# Patient Record
Sex: FEMALE | Race: BLACK OR AFRICAN AMERICAN | NOT HISPANIC OR LATINO | Employment: FULL TIME | URBAN - METROPOLITAN AREA
[De-identification: names, ages, dates, MRNs, and addresses within clinical notes are randomized per-mention and may not be internally consistent; named-entity substitution may affect disease eponyms.]

---

## 2017-01-06 RX ORDER — PEN NEEDLE, DIABETIC 30 GX3/16"
NEEDLE, DISPOSABLE MISCELLANEOUS
Qty: 150 EACH | Refills: 6 | Status: CANCELLED | OUTPATIENT
Start: 2017-01-06

## 2017-01-06 RX ORDER — INSULIN ASPART 100 [IU]/ML
5 INJECTION, SOLUTION INTRAVENOUS; SUBCUTANEOUS
Qty: 1 BOX | Refills: 6 | Status: CANCELLED | OUTPATIENT
Start: 2017-01-06 | End: 2018-01-06

## 2017-01-06 NOTE — TELEPHONE ENCOUNTER
Called in refill for medications listed below novolog dispense 1 box no refills, levemir dispense 3 ml no refills, pen needles dispense 150 refills 0 called to [facility]

## 2017-01-06 NOTE — TELEPHONE ENCOUNTER
"----- Message from Shona Panda sent at 1/6/2017  9:24 AM CST -----  Contact: Patient  Patient need a Rx refill for:    insulin aspart (NOVOLOG) 100 unit/mL InPn pen  insulin detemir (LEVEMIR FLEXPEN) 100 unit/mL (3 mL) SubQ InPn pen  pen needle, diabetic 31 gauge x 5/16" Ndle    Please send Rx to [facility] [phone] (Phone)  [phone] (Fax)    Please call patient at [phone].  "

## 2017-01-09 ENCOUNTER — HOSPITAL ENCOUNTER (INPATIENT)
Facility: HOSPITAL | Age: 37
LOS: 2 days | Discharge: HOME | DRG: 638 | End: 2017-01-11
Attending: EMERGENCY MEDICINE | Admitting: HOSPITALIST
Payer: COMMERCIAL

## 2017-01-09 DIAGNOSIS — E10.10 DIABETIC KETOACIDOSIS WITHOUT COMA ASSOCIATED WITH TYPE 1 DIABETES MELLITUS: Primary | ICD-10-CM

## 2017-01-09 PROBLEM — N17.9 AKI (ACUTE KIDNEY INJURY): Status: ACTIVE | Noted: 2017-01-09

## 2017-01-09 PROBLEM — E87.1 HYPONATREMIA: Status: ACTIVE | Noted: 2017-01-09

## 2017-01-09 LAB
ALBUMIN SERPL BCP-MCNC: 4.6 G/DL
ALLENS TEST: ABNORMAL
ALP SERPL-CCNC: 71 U/L
ALT SERPL W/O P-5'-P-CCNC: 13 U/L
ANION GAP SERPL CALC-SCNC: 12 MMOL/L
ANION GAP SERPL CALC-SCNC: 14 MMOL/L
ANION GAP SERPL CALC-SCNC: 17 MMOL/L
ANION GAP SERPL CALC-SCNC: 23 MMOL/L
AST SERPL-CCNC: 25 U/L
B-HCG UR QL: NEGATIVE
B-OH-BUTYR BLD STRIP-SCNC: 5.1 MMOL/L
BACTERIA #/AREA URNS AUTO: NORMAL /HPF
BASOPHILS # BLD AUTO: 0.01 K/UL
BASOPHILS NFR BLD: 0.1 %
BILIRUB SERPL-MCNC: 0.4 MG/DL
BILIRUB UR QL STRIP: NEGATIVE
BUN SERPL-MCNC: 11 MG/DL
BUN SERPL-MCNC: 5 MG/DL
BUN SERPL-MCNC: 7 MG/DL
BUN SERPL-MCNC: 9 MG/DL
CALCIUM SERPL-MCNC: 7.8 MG/DL
CALCIUM SERPL-MCNC: 8 MG/DL
CALCIUM SERPL-MCNC: 8.1 MG/DL
CALCIUM SERPL-MCNC: 9.4 MG/DL
CHLORIDE SERPL-SCNC: 108 MMOL/L
CHLORIDE SERPL-SCNC: 108 MMOL/L
CHLORIDE SERPL-SCNC: 109 MMOL/L
CHLORIDE SERPL-SCNC: 97 MMOL/L
CLARITY UR REFRACT.AUTO: CLEAR
CO2 SERPL-SCNC: 6 MMOL/L
CO2 SERPL-SCNC: 6 MMOL/L
CO2 SERPL-SCNC: 7 MMOL/L
CO2 SERPL-SCNC: 9 MMOL/L
COLOR UR AUTO: ABNORMAL
CREAT SERPL-MCNC: 1.1 MG/DL
CREAT SERPL-MCNC: 1.1 MG/DL
CREAT SERPL-MCNC: 1.2 MG/DL
CREAT SERPL-MCNC: 1.6 MG/DL
CTP QC/QA: YES
DIFFERENTIAL METHOD: ABNORMAL
EOSINOPHIL # BLD AUTO: 0 K/UL
EOSINOPHIL NFR BLD: 0 %
ERYTHROCYTE [DISTWIDTH] IN BLOOD BY AUTOMATED COUNT: 11.8 %
EST. GFR  (AFRICAN AMERICAN): 48.1 ML/MIN/1.73 M^2
EST. GFR  (AFRICAN AMERICAN): >60 ML/MIN/1.73 M^2
EST. GFR  (NON AFRICAN AMERICAN): 41.7 ML/MIN/1.73 M^2
EST. GFR  (NON AFRICAN AMERICAN): 59.1 ML/MIN/1.73 M^2
EST. GFR  (NON AFRICAN AMERICAN): >60 ML/MIN/1.73 M^2
EST. GFR  (NON AFRICAN AMERICAN): >60 ML/MIN/1.73 M^2
ESTIMATED AVG GLUCOSE: 278 MG/DL
GLUCOSE SERPL-MCNC: 216 MG/DL
GLUCOSE SERPL-MCNC: 250 MG/DL
GLUCOSE SERPL-MCNC: 283 MG/DL
GLUCOSE SERPL-MCNC: 529 MG/DL
GLUCOSE UR QL STRIP: ABNORMAL
HBA1C MFR BLD HPLC: 11.3 %
HCO3 UR-SCNC: 7.3 MMOL/L (ref 24–28)
HCT VFR BLD AUTO: 44.5 %
HGB BLD-MCNC: 15.4 G/DL
HGB UR QL STRIP: ABNORMAL
HYALINE CASTS UR QL AUTO: 0 /LPF
KETONES UR QL STRIP: ABNORMAL
LACTATE SERPL-SCNC: 1.4 MMOL/L
LEUKOCYTE ESTERASE UR QL STRIP: NEGATIVE
LIPASE SERPL-CCNC: 25 U/L
LYMPHOCYTES # BLD AUTO: 2.2 K/UL
LYMPHOCYTES NFR BLD: 25.4 %
MAGNESIUM SERPL-MCNC: 2.8 MG/DL
MCH RBC QN AUTO: 32.2 PG
MCHC RBC AUTO-ENTMCNC: 34.6 %
MCV RBC AUTO: 93 FL
MICROSCOPIC COMMENT: NORMAL
MONOCYTES # BLD AUTO: 0.4 K/UL
MONOCYTES NFR BLD: 4.4 %
NEUTROPHILS # BLD AUTO: 6 K/UL
NEUTROPHILS NFR BLD: 70 %
NITRITE UR QL STRIP: NEGATIVE
PCO2 BLDA: 22 MMHG (ref 35–45)
PH SMN: 7.13 [PH] (ref 7.35–7.45)
PH UR STRIP: 6 [PH] (ref 5–8)
PHOSPHATE SERPL-MCNC: 1.8 MG/DL
PHOSPHATE SERPL-MCNC: 1.9 MG/DL
PHOSPHATE SERPL-MCNC: 3.2 MG/DL
PLATELET # BLD AUTO: 236 K/UL
PMV BLD AUTO: 10.4 FL
PO2 BLDA: 41 MMHG (ref 40–60)
POC BE: -22 MMOL/L
POC SATURATED O2: 62 % (ref 95–100)
POC TCO2: 8 MMOL/L (ref 24–29)
POCT GLUCOSE: 183 MG/DL (ref 70–110)
POCT GLUCOSE: 185 MG/DL (ref 70–110)
POCT GLUCOSE: 199 MG/DL (ref 70–110)
POCT GLUCOSE: 201 MG/DL (ref 70–110)
POCT GLUCOSE: 213 MG/DL (ref 70–110)
POCT GLUCOSE: 220 MG/DL (ref 70–110)
POCT GLUCOSE: 228 MG/DL (ref 70–110)
POCT GLUCOSE: 237 MG/DL (ref 70–110)
POCT GLUCOSE: 243 MG/DL (ref 70–110)
POCT GLUCOSE: 255 MG/DL (ref 70–110)
POCT GLUCOSE: 270 MG/DL (ref 70–110)
POCT GLUCOSE: 288 MG/DL (ref 70–110)
POCT GLUCOSE: 296 MG/DL (ref 70–110)
POTASSIUM SERPL-SCNC: 3.9 MMOL/L
POTASSIUM SERPL-SCNC: 4.5 MMOL/L
POTASSIUM SERPL-SCNC: 4.6 MMOL/L
POTASSIUM SERPL-SCNC: 5 MMOL/L
PROT SERPL-MCNC: 10.1 G/DL
PROT UR QL STRIP: ABNORMAL
RBC # BLD AUTO: 4.79 M/UL
RBC #/AREA URNS AUTO: 4 /HPF (ref 0–4)
SAMPLE: ABNORMAL
SITE: ABNORMAL
SODIUM SERPL-SCNC: 126 MMOL/L
SODIUM SERPL-SCNC: 129 MMOL/L
SODIUM SERPL-SCNC: 130 MMOL/L
SODIUM SERPL-SCNC: 131 MMOL/L
SP GR UR STRIP: 1.02 (ref 1–1.03)
SQUAMOUS #/AREA URNS AUTO: 1 /HPF
URN SPEC COLLECT METH UR: ABNORMAL
UROBILINOGEN UR STRIP-ACNC: NEGATIVE EU/DL
WBC # BLD AUTO: 8.59 K/UL
WBC #/AREA URNS AUTO: 1 /HPF (ref 0–5)
YEAST UR QL AUTO: NORMAL

## 2017-01-09 PROCEDURE — 20600001 HC STEP DOWN PRIVATE ROOM

## 2017-01-09 PROCEDURE — 83690 ASSAY OF LIPASE: CPT

## 2017-01-09 PROCEDURE — 96375 TX/PRO/DX INJ NEW DRUG ADDON: CPT

## 2017-01-09 PROCEDURE — 81025 URINE PREGNANCY TEST: CPT | Performed by: EMERGENCY MEDICINE

## 2017-01-09 PROCEDURE — 82962 GLUCOSE BLOOD TEST: CPT

## 2017-01-09 PROCEDURE — 25000003 PHARM REV CODE 250: Performed by: HOSPITALIST

## 2017-01-09 PROCEDURE — 36591 DRAW BLOOD OFF VENOUS DEVICE: CPT

## 2017-01-09 PROCEDURE — 85025 COMPLETE CBC W/AUTO DIFF WBC: CPT

## 2017-01-09 PROCEDURE — 96372 THER/PROPH/DIAG INJ SC/IM: CPT

## 2017-01-09 PROCEDURE — 81001 URINALYSIS AUTO W/SCOPE: CPT

## 2017-01-09 PROCEDURE — 80053 COMPREHEN METABOLIC PANEL: CPT

## 2017-01-09 PROCEDURE — 63600175 PHARM REV CODE 636 W HCPCS: Performed by: EMERGENCY MEDICINE

## 2017-01-09 PROCEDURE — 83036 HEMOGLOBIN GLYCOSYLATED A1C: CPT

## 2017-01-09 PROCEDURE — 83605 ASSAY OF LACTIC ACID: CPT

## 2017-01-09 PROCEDURE — 82803 BLOOD GASES ANY COMBINATION: CPT

## 2017-01-09 PROCEDURE — 99285 EMERGENCY DEPT VISIT HI MDM: CPT | Mod: 25

## 2017-01-09 PROCEDURE — 82010 KETONE BODYS QUAN: CPT

## 2017-01-09 PROCEDURE — 96366 THER/PROPH/DIAG IV INF ADDON: CPT

## 2017-01-09 PROCEDURE — 63600175 PHARM REV CODE 636 W HCPCS: Performed by: HOSPITALIST

## 2017-01-09 PROCEDURE — 36415 COLL VENOUS BLD VENIPUNCTURE: CPT

## 2017-01-09 PROCEDURE — 80048 BASIC METABOLIC PNL TOTAL CA: CPT

## 2017-01-09 PROCEDURE — 99223 1ST HOSP IP/OBS HIGH 75: CPT | Mod: ,,, | Performed by: HOSPITALIST

## 2017-01-09 PROCEDURE — 25000003 PHARM REV CODE 250: Performed by: EMERGENCY MEDICINE

## 2017-01-09 PROCEDURE — 93005 ELECTROCARDIOGRAM TRACING: CPT

## 2017-01-09 PROCEDURE — 84100 ASSAY OF PHOSPHORUS: CPT

## 2017-01-09 PROCEDURE — 80048 BASIC METABOLIC PNL TOTAL CA: CPT | Mod: 91

## 2017-01-09 PROCEDURE — 99291 CRITICAL CARE FIRST HOUR: CPT | Mod: ,,, | Performed by: EMERGENCY MEDICINE

## 2017-01-09 PROCEDURE — 93010 ELECTROCARDIOGRAM REPORT: CPT | Mod: ,,, | Performed by: INTERNAL MEDICINE

## 2017-01-09 PROCEDURE — 83735 ASSAY OF MAGNESIUM: CPT

## 2017-01-09 PROCEDURE — 96365 THER/PROPH/DIAG IV INF INIT: CPT

## 2017-01-09 RX ORDER — SODIUM CHLORIDE 450 MG/100ML
1000 INJECTION, SOLUTION INTRAVENOUS CONTINUOUS
Status: DISCONTINUED | OUTPATIENT
Start: 2017-01-09 | End: 2017-01-09

## 2017-01-09 RX ORDER — ACETAMINOPHEN 325 MG/1
500 TABLET ORAL EVERY 6 HOURS PRN
COMMUNITY
End: 2019-10-08

## 2017-01-09 RX ORDER — SODIUM CHLORIDE 9 MG/ML
INJECTION, SOLUTION INTRAVENOUS CONTINUOUS
Status: DISCONTINUED | OUTPATIENT
Start: 2017-01-09 | End: 2017-01-09

## 2017-01-09 RX ORDER — ONDANSETRON 2 MG/ML
4 INJECTION INTRAMUSCULAR; INTRAVENOUS
Status: COMPLETED | OUTPATIENT
Start: 2017-01-09 | End: 2017-01-09

## 2017-01-09 RX ORDER — DEXTROSE MONOHYDRATE AND SODIUM CHLORIDE 5; .45 G/100ML; G/100ML
INJECTION, SOLUTION INTRAVENOUS CONTINUOUS
Status: DISCONTINUED | OUTPATIENT
Start: 2017-01-09 | End: 2017-01-10

## 2017-01-09 RX ORDER — ACETAMINOPHEN 325 MG/1
650 TABLET ORAL EVERY 6 HOURS PRN
Status: DISCONTINUED | OUTPATIENT
Start: 2017-01-09 | End: 2017-01-11 | Stop reason: HOSPADM

## 2017-01-09 RX ORDER — ENOXAPARIN SODIUM 100 MG/ML
40 INJECTION SUBCUTANEOUS EVERY 24 HOURS
Status: DISCONTINUED | OUTPATIENT
Start: 2017-01-09 | End: 2017-01-11 | Stop reason: HOSPADM

## 2017-01-09 RX ORDER — SODIUM CHLORIDE 0.9 % (FLUSH) 0.9 %
3 SYRINGE (ML) INJECTION EVERY 8 HOURS
Status: DISCONTINUED | OUTPATIENT
Start: 2017-01-09 | End: 2017-01-11 | Stop reason: HOSPADM

## 2017-01-09 RX ORDER — ONDANSETRON 2 MG/ML
4 INJECTION INTRAMUSCULAR; INTRAVENOUS EVERY 8 HOURS PRN
Status: DISCONTINUED | OUTPATIENT
Start: 2017-01-09 | End: 2017-01-11 | Stop reason: HOSPADM

## 2017-01-09 RX ORDER — DEXTROSE MONOHYDRATE 100 MG/ML
1000 INJECTION, SOLUTION INTRAVENOUS
Status: DISCONTINUED | OUTPATIENT
Start: 2017-01-09 | End: 2017-01-11 | Stop reason: HOSPADM

## 2017-01-09 RX ORDER — MORPHINE SULFATE 2 MG/ML
2 INJECTION, SOLUTION INTRAMUSCULAR; INTRAVENOUS EVERY 4 HOURS PRN
Status: DISCONTINUED | OUTPATIENT
Start: 2017-01-09 | End: 2017-01-11 | Stop reason: HOSPADM

## 2017-01-09 RX ORDER — SODIUM,POTASSIUM PHOSPHATES 280-250MG
1 POWDER IN PACKET (EA) ORAL
Status: DISCONTINUED | OUTPATIENT
Start: 2017-01-09 | End: 2017-01-11 | Stop reason: HOSPADM

## 2017-01-09 RX ORDER — DEXTROSE MONOHYDRATE 100 MG/ML
1000 INJECTION, SOLUTION INTRAVENOUS
Status: DISCONTINUED | OUTPATIENT
Start: 2017-01-09 | End: 2017-01-09

## 2017-01-09 RX ADMIN — SODIUM CHLORIDE 1000 ML: 0.9 INJECTION, SOLUTION INTRAVENOUS at 10:01

## 2017-01-09 RX ADMIN — POTASSIUM & SODIUM PHOSPHATES POWDER PACK 280-160-250 MG 1 PACKET: 280-160-250 PACK at 09:01

## 2017-01-09 RX ADMIN — ONDANSETRON 4 MG: 2 INJECTION INTRAMUSCULAR; INTRAVENOUS at 10:01

## 2017-01-09 RX ADMIN — MORPHINE SULFATE 2 MG: 2 INJECTION, SOLUTION INTRAMUSCULAR; INTRAVENOUS at 03:01

## 2017-01-09 RX ADMIN — ENOXAPARIN SODIUM 40 MG: 100 INJECTION SUBCUTANEOUS at 12:01

## 2017-01-09 RX ADMIN — ACETAMINOPHEN 650 MG: 325 TABLET ORAL at 09:01

## 2017-01-09 RX ADMIN — SODIUM CHLORIDE 1000 ML: 0.9 INJECTION, SOLUTION INTRAVENOUS at 11:01

## 2017-01-09 RX ADMIN — SODIUM CHLORIDE 0.78 UNITS/HR: 9 INJECTION, SOLUTION INTRAVENOUS at 10:01

## 2017-01-09 RX ADMIN — SODIUM CHLORIDE: 0.9 INJECTION, SOLUTION INTRAVENOUS at 01:01

## 2017-01-09 RX ADMIN — DEXTROSE AND SODIUM CHLORIDE: 5; .45 INJECTION, SOLUTION INTRAVENOUS at 03:01

## 2017-01-09 RX ADMIN — SODIUM CHLORIDE, PRESERVATIVE FREE 3 ML: 5 INJECTION INTRAVENOUS at 09:01

## 2017-01-09 NOTE — NURSING
Pt admitted to 1006 from ED. Pt reported n/v during the weekend and reported to ED this am d/t history of DM. Pt arrived on insulin gtt and NS infusing through 20g IV to left AC, family at bedside. Will continue to monitor.

## 2017-01-09 NOTE — IP AVS SNAPSHOT
[address]  [place] [address]  Phone: [phone]           I have received a copy of my After Visit Summary and discharge instructions from [facility].    INSTRUCTIONS RECEIVED AND UNDERSTOOD BY:                     Patient/Patient Representative: ________________________________________________________________     Date/Time: ________________________________________________________________                     Instructions Given By: ________________________________________________________________     Date/Time: ________________________________________________________________

## 2017-01-09 NOTE — ED NOTES
Patient complains of N&V, headache, fatigue. patient in a diabetic. No fever, chills or pain. Complains of generalized discomfort

## 2017-01-09 NOTE — ED NOTES
Pt identifiers checked and correct. Verified name and .  john at bedside    LOC: The patient is awake, alert and aware of environment with an appropriate affect, the patient is oriented x 3 and speaking appropriately.   APPEARANCE: Patient appears to be in no acute distress, patient is clean and well groomed, patient's clothing is properly fastened.   SKIN: The skin is warm and dry, color consistent with ethnicity, patient has normal skin turgor and moist mucus membranes, skin intact, no breakdown or bruising noted.   MUSCULOSKELETAL: Patient moving all extremities spontaneously, no obvious swelling or deformities noted.   RESPIRATORY: Airway is open and patent, respirations are spontaneous, patient has a normal effort and rate, no accessory muscle use noted.   CARDIAC: Patient has a normal rate and regular rhythm, no periphreal edema noted, capillary refill < 3 seconds.   ABDOMEN: Soft and non tender to palpation, no distention noted, active bowel sounds present in all four quadrants.   NEUROLOGIC: PERRL, Eyes open spontaneously, behavior appropriate to situation, follows commands, facial expression symmetrical, bilateral hand grasp equal and even, purposeful motor response noted, normal sensation in all extremities when touched with a finger.

## 2017-01-09 NOTE — ED NOTES
Pt informed of room assignment and that room was being cleaned at this time; pt verbalized understanding

## 2017-01-09 NOTE — H&P
History & Physical  [facility]    SUBJECTIVE:   Chief Complaint/Reason for Admission: abdominal pain    History of Present Illness:  Patient is a 36 y.o. female with PMH of DM-I who presents to ED with complaints of abdominal pain, N/V x 3 days.  Pt reports she was in her normal state of health, until gradual onset of mid-epigastric cramping abdominal pain.  Progressively worsened, including development of non-bloody, non-bilious emesis.  Associated with frontal HA, SOB and fatigue, and chronic polyuria.  Pt denies fever, chills, cough, diarrhea, dysuria.  Does note family member had viral URI recently.  Reports compliance with insulin regimen (Levemir 10units BID and Aspart 6units TIDWM and SSI) except for at beginning of the year, due to insurance issues, missed several days of short-acting insulin (but still used Levemir).    Reports blood sugar usually in 200s.  Sees Dr. Kc as outpatient.  Last episode of DKA 1 year ago.    Old chart was reviewed.    Past Medical History   Diagnosis Date    Diabetes mellitus     Irregular heartbeat        Past Surgical History   Procedure Laterality Date     section, low transverse        Family History   Problem Relation Age of Onset    Diabetes Mother     Hypertension Mother     Diabetes Father        Social History   Substance Use Topics    Smoking status: Never Smoker    Smokeless tobacco: None    Alcohol use 0.6 oz/week     1 Glasses of wine per week      Comment: occasionally      Review of patient's allergies indicates:  No Known Allergies    No current facility-administered medications on file prior to encounter.      Current Outpatient Prescriptions on File Prior to Encounter   Medication Sig Dispense Refill    blood sugar diagnostic Strp Check glucose 4x/day 150 strip 11    ibuprofen (ADVIL,MOTRIN) 600 MG tablet Take 1 tablet (600 mg total) by mouth every 6 (six) hours as needed for Pain. 20 tablet 0    insulin aspart (NOVOLOG) 100  "unit/mL InPn pen Inject 5 Units into the skin 3 (three) times daily with meals. 1 Box 2    insulin detemir (LEVEMIR FLEXPEN) 100 unit/mL (3 mL) SubQ InPn pen Inject 7 Units into the skin 2 (two) times daily. 3 mL 3    metoclopramide HCl (REGLAN) 10 MG tablet Take 1 tablet (10 mg total) by mouth 3 (three) times daily as needed (nausea). 90 tablet 1    pen needle, diabetic 31 gauge x 5/16" Ndle Insulin 4x /day 150 each 11        Review of Systems:  Constitutional: no fever or chills  Eyes: no visual changes  ENT: no nasal congestion or sore throat  Respiratory: positive for dyspnea on exertion, negative for cough  Cardiovascular: no chest pain or palpitations  Gastrointestinal: positive for abdominal pain, nausea and vomiting, negative for change in bowel habits  Genitourinary: no hematuria or dysuria  Musculoskeletal: no arthralgias or myalgias  Neurological: no seizures or tremors  Endocrine: no heat or cold intolerance     OBJECTIVE:     Vital Signs (Most Recent)   Temp: 98.2 °F (36.8 °C) (01/09/17 0844)  Pulse: 92 (01/09/17 1101)  Resp: (!) 26 (01/09/17 1101)  BP: 105/69 (01/09/17 1101)  SpO2: 100 % (01/09/17 0859)  Body mass index is 23.3 kg/(m^2).      Physical Exam:  Gen- well-developed, well-nourished, mild distress with tachypnea  Head- NC/AT, PERRL, no oral lesions  Neck- supple, trachea midline, no cervical LAD  CVS- S1 and S2 present, tachycardic, regular rhythm, no murmurs  Resp- CTA b/l, tachypneic  Abd- BS+, soft, NT, ND  Ext- no clubbing, cyanosis, or edema  Skin- warm, dry, no rashes  Neuro- alert and oriented x3, grossly intact    Laboratory:  CBC/Anemia Labs: Coags:      Recent Labs  Lab 01/09/17 0912   WBC 8.59   HGB 15.4   HCT 44.5      MCV 93   RDW 11.8    No results for input(s): INR, APTT in the last 168 hours.    Invalid input(s): PT     Chemistries: ABG:     Recent Labs  Lab 01/09/17  0912   *   K 5.0   CL 97   CO2 6*   BUN 11   CREATININE 1.6*   CALCIUM 9.4   PROT 10.1* "   BILITOT 0.4   ALKPHOS 71   ALT 13   AST 25   MG 2.8*   PHOS 3.2      Recent Labs  Lab 01/09/17  0917   PH 7.129*   PCO2 22.0*   PO2 41   HCO3 7.3*   POCSATURATED 62*   BE -22        POCT Glucose: HbA1c:      Recent Labs  Lab 01/09/17  1111   POCTGLUCOSE 296*    Hemoglobin A1C   Date Value Ref Range Status   01/09/2017 11.3 (H) 4.5 - 6.2 % Final     Comment:     According to ADA guidelines, hemoglobin A1C <7.0% represents  optimal control in non-pregnant diabetic patients.  Different  metrics may apply to specific populations.   Standards of Medical Care in Diabetes - 2016.  For the purpose of screening for the presence of diabetes:  <5.7%     Consistent with the absence of diabetes  5.7-6.4%  Consistent with increasing risk for diabetes   (prediabetes)  >or=6.5%  Consistent with diabetes  Currently no consensus exists for use of hemoglobin A1C  for diagnosis of diabetes for children.     01/09/2016 11.6 (H) 4.5 - 6.2 % Final          Diagnostic Results:  Labs: Reviewed    ASSESSMENT/PLAN:     DKA  DM- I, uncontrolled, A1C 11.3  AGMA  Hyperglycemia  -Unclear inciting event, may have been developing since medication changes last week, vs viral infection  -will check EKG and lipase  -s/p 1L NS; continue NS at 100cc/h; when glucose < 200, change to D5 1/2NS  -continue insulin drip per protocol with POCT glu q1h  -BMP q4h; when AG closes, will transition to SC regimen- would start Levemir 8units BID with Aspart 4units TIDWM and SSI    NICHOL  -likely pre-renal 2/2 N/V, DKA  -continue IVF  -avoid nephrotoxic agents    Hyponatremia  -psuedo due to hyperglycemia  -corrects to 133  -continue IVF    DVT ppx- Lovenox  CODE status- FULL    Dispo- home in 1-2 days pending resolution of DKA; recommend close f/u with Endocrine given uncontrolled DM    Marichuy Waller MD  Hospital Medicine Staff

## 2017-01-09 NOTE — IP AVS SNAPSHOT
[facility]  [address]  [facility] [address]  Phone: [phone]           Patient Discharge Instructions     Our goal is to set you up for success. This packet includes information on your condition, medications, and your home care. It will help you to care for yourself so you don't get sicker and need to go back to the hospital.     Please ask your nurse if you have any questions.        There are many details to remember when preparing to leave the hospital. Here is what you will need to do:    1. Take your medicine. If you are prescribed medications, review your Medication List in the following pages. You may have new medications to  at the pharmacy and others that you'll need to stop taking. Review the instructions for how and when to take your medications. Talk with your doctor or nurses if you are unsure of what to do.     2. Go to your follow-up appointments. Specific follow-up information is listed in the following pages. Your may be contacted by a transition nurse or clinical provider about future appointments. Be sure we have all of the phone numbers to reach you, if needed. Please contact your provider's office if you are unable to make an appointment.     3. Watch for warning signs. Your doctor or nurse will give you detailed warning signs to watch for and when to call for assistance. These instructions may also include educational information about your condition. If you experience any of warning signs to your health, call your doctor.               [facility]  Unless otherwise directed by your provider, please contact [facility], our nurse care line that is available for 24/7 assistance.     [phone] (toll-free)    Registered nurses in the [facility] provide clinical advisement, health education, appointment booking, and other advisory services.                    ** Verify the list of medication(s) below is accurate and up  to date. Carry this with you in case of emergency. If your medications have changed, please notify your healthcare provider.             Medication List      START taking these medications        Additional Info                      potassium, sodium phosphates 280-160-250 mg Pwpk   Commonly known as:  PHOS-NAK   Refills:  0   Dose:  1 packet    Last time this was given:  1 packet on 1/11/2017 10:08 AM   Instructions:  Take 1 packet by mouth 4 (four) times daily with meals and nightly.     Begin Date    AM    Noon    PM    Bedtime         CHANGE how you take these medications        Additional Info                      insulin aspart 100 unit/mL Inpn pen   Commonly known as:  NovoLOG   Quantity:  1 Box   Refills:  2   Dose:  8 Units   What changed:  how much to take    Last time this was given:  11 Units on 1/11/2017 10:07 AM   Instructions:  Inject 8 Units into the skin 3 (three) times daily with meals.     Begin Date    AM    Noon    PM    Bedtime       insulin detemir 100 unit/mL (3 mL) Inpn pen   Commonly known as:  LEVEMIR FLEXTOUCH   Quantity:  1 Box   Refills:  1   Dose:  16 Units   What changed:  how much to take    Last time this was given:  16 Units on 1/11/2017 10:08 AM   Instructions:  Inject 16 Units into the skin 2 (two) times daily.     Begin Date    AM    Noon    PM    Bedtime         CONTINUE taking these medications        Additional Info                      acetaminophen 325 MG tablet   Commonly known as:  TYLENOL   Refills:  0   Dose:  500 mg    Last time this was given:  650 mg on 1/9/2017  9:15 PM   Instructions:  Take 500 mg by mouth every 6 (six) hours as needed for Pain.     Begin Date    AM    Noon    PM    Bedtime       blood sugar diagnostic Strp   Quantity:  150 strip   Refills:  11    Instructions:  Check glucose 4x/day     Begin Date    AM    Noon    PM    Bedtime       metoclopramide HCl 10 MG tablet   Commonly known as:  REGLAN   Quantity:  90 tablet   Refills:  1   Dose:  10 mg     "Instructions:  Take 1 tablet (10 mg total) by mouth 3 (three) times daily as needed (nausea).     Begin Date    AM    Noon    PM    Bedtime       pen needle, diabetic 31 gauge x 5/16" Ndle   Quantity:  150 each   Refills:  11    Instructions:  Insulin 4x /day     Begin Date    AM    Noon    PM    Bedtime         STOP taking these medications     ibuprofen 600 MG tablet   Commonly known as:  ADVIL,MOTRIN            Where to Get Your Medications      These medications [facility] [address] AT [facility]  [address], [facility] [address]    Hours:  24-hours Phone:  [phone]     insulin aspart 100 unit/mL Inpn pen    insulin detemir 100 unit/mL (3 mL) Inpn pen         You can get these medications from any pharmacy     You don't need a prescription for these medications     potassium, sodium phosphates 280-160-250 mg Pwpk                  Please bring to all follow up appointments:    1. A copy of your discharge instructions.  2. All medicines you are currently taking in their original bottles.  3. Identification and insurance card.    Please arrive 15 minutes ahead of scheduled appointment time.    Please call 24 hours in advance if you must reschedule your appointment and/or time.        Your Scheduled Appointments     Jan 12, 2017 11:00 AM CST   Established Patient with MD Chandler Pimentel/Diab/[facility] (Jani giancarlo )    [address]  [place] [address] [phone]              Follow-up Information     Follow up with Chandler Delgado/Di[facility] On 1/12/2017.    Specialty:  Endocrinology    Contact information:    [address]  [facility] [address] [phone]    Additional information:    9th Floor          Discharge Instructions       1. Monitor blood glucose levels prior to meals and at bedtime.   2. Please keep a glucose log.  3. Inject Novolog 8 units " "subcutaneously three times per day with meals.  4. Use previously prescribed sliding scale for correction.  5. Inject Levemir 16 units subcutaneously every 12 hours.   6. Follow up in Endocrinology Clinic for further insulin adjustment.      Primary Diagnosis     Your primary diagnosis was:  Diabetic Ketoacidosis Without Coma Associated With Type 1 Diabetes Mellitus      Admission Information     Date & Time Provider Department CSN    1/9/2017  8:45 AM Tiki Staton MD [facility] [id number]      Care Providers     Provider Role Specialty Primary office phone    Tiki Staton MD Attending Provider Hospitalist [phone]    Tiki Staton MD Team Attending  Hospitalist [phone]      Your Vitals Were     BP Pulse Temp Resp Height Weight    101/60 (BP Location: Left arm, Patient Position: Lying, BP Method: Automatic) 79 98.7 °F (37.1 °C) (Oral) 16 5' 5" (1.651 m) 63.5 kg (140 lb)    SpO2 BMI             100% 23.3 kg/m2         Recent Lab Values        1/9/2016 1/9/2017                       10:00 AM  9:12 AM          A1C 11.6 (H) 11.3 (H)          Comment for A1C at  9:12 AM on 1/9/2017:  According to ADA guidelines, hemoglobin A1C <7.0% represents  optimal control in non-pregnant diabetic patients.  Different  metrics may apply to specific populations.   Standards of Medical Care in Diabetes - 2016.  For the purpose of screening for the presence of diabetes:  <5.7%     Consistent with the absence of diabetes  5.7-6.4%  Consistent with increasing risk for diabetes   (prediabetes)  >or=6.5%  Consistent with diabetes  Currently no consensus exists for use of hemoglobin A1C  for diagnosis of diabetes for children.        Allergies as of 1/11/2017     No Known Allergies      Advance Directives     An advance directive is a document which, in the event you are no longer able to make decisions for yourself, tells your healthcare team what kind of treatment you do or do not want to " receive, or who you would like to make those decisions for you.  If you do not currently have an advance directive, [facility] encourages you to create one.  For more information call:  [phone]), [phone] ([phone]),  or log on to [email or web address].        Language Assistance Services     ATTENTION: Language assistance services are available, free of charge. Please call [phone].      ATENCIÓN: Si habla español, tiene a mcduffie disposición servicios gratuitos de asistencia lingüística. Llame al [phone].     CHÚ Ý: N?u b?n nói Ti?ng Vi?t, có các d?ch v? h? tr? ngôn ng? mi?n phí dành cho b?n. G?i s? [phone].        Diabetes Discharge Instructions                                   [facility] Sign-Up     Activating your [facility] account is as easy as 1-2-3!     1) Visit [email or web address], select Sign Up Now, enter this activation code and your date of birth, then select Next.  [email or web address]  Expires: 2/25/2017 11:21 AM      2) Create a username and password to use when you visit [facility] in the future and select a security question in case you lose your password and select Next.    3) Enter your e-mail address and click Sign Up!    Additional Information  If you have questions, please e-mail [email or web address] or call [phone] to talk to our [facility] staff. Remember, [facility] is NOT to be used for urgent needs. For medical emergencies, dial 911.          [facility] complies with applicable Federal civil rights laws and does not discriminate on the basis of race, color, national origin, age, disability, or sex.

## 2017-01-09 NOTE — ED PROVIDER NOTES
Encounter Date: 2017    SCRIBE #1 NOTE: I, Mickharinder Hammer, am scribing for, and in the presence of,  Dr. Brown. I have scribed the entire note.       History     Chief Complaint   Patient presents with    Emesis     diabetic vomiting all weekend, ketones in urine, sugars running 275 last night     Review of patient's allergies indicates:  No Known Allergies  HPI Comments: 36 y.o. Female with a history of type 1 DM presents with hyperglycemia, nausea, vomiting. Pt has a history of DM and prior history of DKA approximately 1 year ago. She endorses vomiting throughout the weekend and generalized diffuse abdominal cramping. No fever. Today her blood glucose was 433, she therefore came to the hospital. No mental status changes. She denies any dysuria, hematuria, chest pain, shortness of breath, or cough.     The history is provided by the patient.     Past Medical History   Diagnosis Date    Diabetes mellitus     Irregular heartbeat      No past medical history pertinent negatives.  Past Surgical History   Procedure Laterality Date     section, low transverse       Family History   Problem Relation Age of Onset    Diabetes Mother     Hypertension Mother     Diabetes Father      Social History   Substance Use Topics    Smoking status: Never Smoker    Smokeless tobacco: None    Alcohol use 0.6 oz/week     1 Glasses of wine per week      Comment: occasionally     Review of Systems   Constitutional: Negative for fever.   HENT: Negative for sore throat.    Respiratory: Negative for cough and shortness of breath.    Cardiovascular: Negative for chest pain.   Gastrointestinal: Positive for abdominal pain, nausea and vomiting.   Genitourinary: Negative for dysuria and hematuria.   Musculoskeletal: Negative for back pain.   Skin: Negative for rash.   Neurological: Negative for weakness.   Hematological: Does not bruise/bleed easily.       Physical Exam   Initial Vitals   BP Pulse Resp Temp SpO2   17  0844 01/09/17 0844 01/09/17 0844 01/09/17 0844 01/09/17 0844   153/100 110 20 98.2 °F (36.8 °C) 97 %     Physical Exam    Nursing note and vitals reviewed.  Constitutional:   Generally weak appearing   HENT:   Head: Normocephalic and atraumatic.   Eyes: EOM are normal.   Neck: Normal range of motion. Neck supple.   Cardiovascular: Normal rate, regular rhythm, normal heart sounds and intact distal pulses.   Pulmonary/Chest: Breath sounds normal. No stridor. No respiratory distress. She has no wheezes. She has no rhonchi. She has no rales.   Abdominal: Soft. There is no tenderness. There is no rebound and no guarding.   Musculoskeletal: Normal range of motion.   Neurological: She is alert and oriented to person, place, and time. She has normal strength. No cranial nerve deficit or sensory deficit.   Skin: Skin is warm and dry.         ED Course   Procedures  Labs Reviewed   CBC W/ AUTO DIFFERENTIAL - Abnormal; Notable for the following:        Result Value    MCH 32.2 (*)     All other components within normal limits   COMPREHENSIVE METABOLIC PANEL - Abnormal; Notable for the following:     Sodium 126 (*)     CO2 6 (*)     Glucose 529 (*)     Creatinine 1.6 (*)     Total Protein 10.1 (*)     Anion Gap 23 (*)     eGFR if  48.1 (*)     eGFR if non  41.7 (*)     All other components within normal limits   URINALYSIS - Abnormal; Notable for the following:     Protein, UA 1+ (*)     Glucose, UA 4+ (*)     Ketones, UA 3+ (*)     Occult Blood UA 1+ (*)     All other components within normal limits    Narrative:     1 cup of urine   BETA - HYDROXYBUTYRATE, SERUM - Abnormal; Notable for the following:     Beta-Hydroxybutyrate 5.1 (*)     All other components within normal limits   HEMOGLOBIN A1C - Abnormal; Notable for the following:     Hemoglobin A1C 11.3 (*)     Estimated Avg Glucose 278 (*)     All other components within normal limits    Narrative:     ghgb add on per Kirk Brown MD  [id number]   01/09/2017  10:08   add on per dr Brown order [id number] Mag & order [id number] Phos @1014   1/9/17   MAGNESIUM - Abnormal; Notable for the following:     Magnesium 2.8 (*)     All other components within normal limits    Narrative:     ghgb add on per Kirk Brown MD [id number]   01/09/2017  10:08   add on per dr Brown order [id number] Mag & order [id number] Phos @1014   1/9/17   BASIC METABOLIC PANEL - Abnormal; Notable for the following:     Sodium 131 (*)     CO2 6 (*)     Glucose 216 (*)     Calcium 8.1 (*)     Anion Gap 17 (*)     eGFR if non  59.1 (*)     All other components within normal limits   ISTAT PROCEDURE - Abnormal; Notable for the following:     POC PH 7.129 (*)     POC PCO2 22.0 (*)     POC HCO3 7.3 (*)     POC SATURATED O2 62 (*)     POC TCO2 8 (*)     All other components within normal limits   POCT GLUCOSE - Abnormal; Notable for the following:     POCT Glucose 296 (*)     All other components within normal limits   POCT GLUCOSE - Abnormal; Notable for the following:     POCT Glucose 199 (*)     All other components within normal limits   POCT GLUCOSE - Abnormal; Notable for the following:     POCT Glucose 201 (*)     All other components within normal limits   POCT GLUCOSE - Abnormal; Notable for the following:     POCT Glucose 185 (*)     All other components within normal limits   HEMOGLOBIN A1C   MAGNESIUM   PHOSPHORUS   URINALYSIS MICROSCOPIC    Narrative:     1 cup of urine   PHOSPHORUS    Narrative:     ghgb add on per Kirk Brown MD [id number]   01/09/2017  10:08   add on per dr Brown order [id number] Mag & order [id number] Phos @1014   1/9/17   LACTIC ACID, PLASMA   LIPASE   LIPASE    Narrative:     ghgb add on per Kirk Brown MD [id number]   01/09/2017  10:08   add on per dr Brown order [id number] Mag & order [id number] Phos @1014   1/9/17  ADD ON PER DR AYON ORDER [id number] LIPASE @1249 1/9/17   POCT URINE PREGNANCY   POCT GLUCOSE MONITORING CONTINUOUS   POCT  GLUCOSE MONITORING CONTINUOUS     EKG Readings: (Independently Interpreted)   Sinus rhythm at 92, no ST elevation or depression          Medical Decision Making:   History:   Old Medical Records: I decided to obtain old medical records.  Initial Assessment:   36 y.o. female with history of type 1 DM that presents with hyperglycemia. My initial differential diagnoses include but are not limited to: DKA, HSS, dehydration, electrolyte disturbance, and NICHOL. IV access established and placed on monitor. Will obtain labs, treat symptoms, and reassess.    Independently Interpreted Test(s):   I have ordered and independently interpreted EKG Reading(s) - see prior notes  Clinical Tests:   Lab Tests: Ordered and Reviewed  Medical Tests: Ordered and Reviewed            Scribe Attestation:   Scribe #1: I performed the above scribed service and the documentation accurately describes the services I performed. I attest to the accuracy of the note.    Attending Attestation:         Attending Critical Care:   Critical Care Times:   ==============================================================  · Total Critical Care Time - exclusive of procedural time: 30 minutes.  ==============================================================  Critical care was necessary to treat or prevent imminent or life-threatening deterioration of the following conditions: metabolic crisis.     Physician Attestation for Scribe:  Physician Attestation Statement for Scribe #1: I, Dr. Brown, reviewed documentation, as scribed by Mick Hammer in my presence, and it is both accurate and complete.         Attending ED Notes:   Pt's laboratories evaluation reveals DKA with a venous PH of 7.12. Her anion gap is 23. Pt therefore started on DKA insulin infusion protocol. This is a critical diagnosis and she has been reassessed numerous times throughout her ED course. I feel she is stable for the floor given that her PH is above 7.0, mental status is normal, and she is  hemodynamically stable. Will admit to medicine.           ED Course     Clinical Impression:   The encounter diagnosis was Diabetic ketoacidosis without coma associated with type 1 diabetes mellitus.    Disposition:   Disposition: Admitted       Kirk Brown MD  01/09/17 1132

## 2017-01-10 PROBLEM — E83.39 HYPOPHOSPHATEMIA: Status: ACTIVE | Noted: 2017-01-10

## 2017-01-10 PROBLEM — E87.20 METABOLIC ACIDOSIS WITH NORMAL ANION GAP AND BICARBONATE LOSSES: Status: ACTIVE | Noted: 2017-01-10

## 2017-01-10 LAB
ANION GAP SERPL CALC-SCNC: 11 MMOL/L
ANION GAP SERPL CALC-SCNC: 11 MMOL/L
ANION GAP SERPL CALC-SCNC: 12 MMOL/L
ANION GAP SERPL CALC-SCNC: 12 MMOL/L
ANION GAP SERPL CALC-SCNC: 13 MMOL/L
BUN SERPL-MCNC: 3 MG/DL
BUN SERPL-MCNC: 3 MG/DL
BUN SERPL-MCNC: 4 MG/DL
BUN SERPL-MCNC: 5 MG/DL
BUN SERPL-MCNC: 8 MG/DL
CALCIUM SERPL-MCNC: 7.5 MG/DL
CALCIUM SERPL-MCNC: 7.7 MG/DL
CALCIUM SERPL-MCNC: 7.9 MG/DL
CALCIUM SERPL-MCNC: 8.2 MG/DL
CALCIUM SERPL-MCNC: 8.5 MG/DL
CHLORIDE SERPL-SCNC: 106 MMOL/L
CHLORIDE SERPL-SCNC: 106 MMOL/L
CHLORIDE SERPL-SCNC: 107 MMOL/L
CHLORIDE SERPL-SCNC: 108 MMOL/L
CHLORIDE SERPL-SCNC: 110 MMOL/L
CO2 SERPL-SCNC: 11 MMOL/L
CO2 SERPL-SCNC: 11 MMOL/L
CO2 SERPL-SCNC: 13 MMOL/L
CO2 SERPL-SCNC: 8 MMOL/L
CO2 SERPL-SCNC: 9 MMOL/L
CREAT SERPL-MCNC: 1 MG/DL
EST. GFR  (AFRICAN AMERICAN): >60 ML/MIN/1.73 M^2
EST. GFR  (NON AFRICAN AMERICAN): >60 ML/MIN/1.73 M^2
GLUCOSE SERPL-MCNC: 254 MG/DL
GLUCOSE SERPL-MCNC: 264 MG/DL
GLUCOSE SERPL-MCNC: 294 MG/DL
GLUCOSE SERPL-MCNC: 306 MG/DL
GLUCOSE SERPL-MCNC: 323 MG/DL
PHOSPHATE SERPL-MCNC: 1.3 MG/DL
PHOSPHATE SERPL-MCNC: 1.7 MG/DL
PHOSPHATE SERPL-MCNC: 1.7 MG/DL
PHOSPHATE SERPL-MCNC: 2 MG/DL
PHOSPHATE SERPL-MCNC: 2.7 MG/DL
POCT GLUCOSE: 186 MG/DL (ref 70–110)
POCT GLUCOSE: 211 MG/DL (ref 70–110)
POCT GLUCOSE: 217 MG/DL (ref 70–110)
POCT GLUCOSE: 233 MG/DL (ref 70–110)
POCT GLUCOSE: 234 MG/DL (ref 70–110)
POCT GLUCOSE: 234 MG/DL (ref 70–110)
POCT GLUCOSE: 282 MG/DL (ref 70–110)
POCT GLUCOSE: 283 MG/DL (ref 70–110)
POCT GLUCOSE: 289 MG/DL (ref 70–110)
POCT GLUCOSE: 306 MG/DL (ref 70–110)
POCT GLUCOSE: 322 MG/DL (ref 70–110)
POCT GLUCOSE: 332 MG/DL (ref 70–110)
POCT GLUCOSE: 358 MG/DL (ref 70–110)
POTASSIUM SERPL-SCNC: 3.5 MMOL/L
POTASSIUM SERPL-SCNC: 3.8 MMOL/L
POTASSIUM SERPL-SCNC: 3.9 MMOL/L
POTASSIUM SERPL-SCNC: 3.9 MMOL/L
POTASSIUM SERPL-SCNC: 4.1 MMOL/L
SODIUM SERPL-SCNC: 129 MMOL/L
SODIUM SERPL-SCNC: 130 MMOL/L
SODIUM SERPL-SCNC: 131 MMOL/L

## 2017-01-10 PROCEDURE — 20600001 HC STEP DOWN PRIVATE ROOM

## 2017-01-10 PROCEDURE — 25000003 PHARM REV CODE 250: Performed by: NURSE PRACTITIONER

## 2017-01-10 PROCEDURE — 99233 SBSQ HOSP IP/OBS HIGH 50: CPT | Mod: ,,, | Performed by: INTERNAL MEDICINE

## 2017-01-10 PROCEDURE — 84100 ASSAY OF PHOSPHORUS: CPT

## 2017-01-10 PROCEDURE — 25000003 PHARM REV CODE 250: Performed by: INTERNAL MEDICINE

## 2017-01-10 PROCEDURE — 25000003 PHARM REV CODE 250: Performed by: HOSPITALIST

## 2017-01-10 PROCEDURE — 36415 COLL VENOUS BLD VENIPUNCTURE: CPT

## 2017-01-10 PROCEDURE — 63600175 PHARM REV CODE 636 W HCPCS: Performed by: HOSPITALIST

## 2017-01-10 PROCEDURE — 80048 BASIC METABOLIC PNL TOTAL CA: CPT | Mod: 91

## 2017-01-10 PROCEDURE — 63600175 PHARM REV CODE 636 W HCPCS: Performed by: INTERNAL MEDICINE

## 2017-01-10 RX ORDER — GLUCAGON 1 MG
1 KIT INJECTION
Status: DISCONTINUED | OUTPATIENT
Start: 2017-01-10 | End: 2017-01-11 | Stop reason: HOSPADM

## 2017-01-10 RX ORDER — INSULIN ASPART 100 [IU]/ML
0-5 INJECTION, SOLUTION INTRAVENOUS; SUBCUTANEOUS
Status: DISCONTINUED | OUTPATIENT
Start: 2017-01-10 | End: 2017-01-11 | Stop reason: HOSPADM

## 2017-01-10 RX ORDER — INSULIN ASPART 100 [IU]/ML
6 INJECTION, SOLUTION INTRAVENOUS; SUBCUTANEOUS
Status: DISCONTINUED | OUTPATIENT
Start: 2017-01-10 | End: 2017-01-10

## 2017-01-10 RX ORDER — IBUPROFEN 200 MG
16 TABLET ORAL
Status: DISCONTINUED | OUTPATIENT
Start: 2017-01-10 | End: 2017-01-11 | Stop reason: HOSPADM

## 2017-01-10 RX ORDER — IBUPROFEN 200 MG
24 TABLET ORAL
Status: DISCONTINUED | OUTPATIENT
Start: 2017-01-10 | End: 2017-01-11 | Stop reason: HOSPADM

## 2017-01-10 RX ORDER — INSULIN ASPART 100 [IU]/ML
8 INJECTION, SOLUTION INTRAVENOUS; SUBCUTANEOUS
Status: DISCONTINUED | OUTPATIENT
Start: 2017-01-11 | End: 2017-01-11 | Stop reason: HOSPADM

## 2017-01-10 RX ADMIN — SODIUM BICARBONATE: 84 INJECTION, SOLUTION INTRAVENOUS at 10:01

## 2017-01-10 RX ADMIN — POTASSIUM & SODIUM PHOSPHATES POWDER PACK 280-160-250 MG 1 PACKET: 280-160-250 PACK at 05:01

## 2017-01-10 RX ADMIN — POTASSIUM & SODIUM PHOSPHATES POWDER PACK 280-160-250 MG 1 PACKET: 280-160-250 PACK at 09:01

## 2017-01-10 RX ADMIN — INSULIN ASPART 6 UNITS: 100 INJECTION, SOLUTION INTRAVENOUS; SUBCUTANEOUS at 12:01

## 2017-01-10 RX ADMIN — INSULIN ASPART 3 UNITS: 100 INJECTION, SOLUTION INTRAVENOUS; SUBCUTANEOUS at 07:01

## 2017-01-10 RX ADMIN — POTASSIUM PHOSPHATE, MONOBASIC AND POTASSIUM PHOSPHATE, DIBASIC 30 MMOL: 224; 236 INJECTION, SOLUTION, CONCENTRATE INTRAVENOUS at 04:01

## 2017-01-10 RX ADMIN — INSULIN ASPART 4 UNITS: 100 INJECTION, SOLUTION INTRAVENOUS; SUBCUTANEOUS at 12:01

## 2017-01-10 RX ADMIN — INSULIN ASPART 6 UNITS: 100 INJECTION, SOLUTION INTRAVENOUS; SUBCUTANEOUS at 07:01

## 2017-01-10 RX ADMIN — ENOXAPARIN SODIUM 40 MG: 100 INJECTION SUBCUTANEOUS at 12:01

## 2017-01-10 RX ADMIN — SODIUM CHLORIDE, PRESERVATIVE FREE 3 ML: 5 INJECTION INTRAVENOUS at 06:01

## 2017-01-10 RX ADMIN — INSULIN DETEMIR 10 UNITS: 100 INJECTION, SOLUTION SUBCUTANEOUS at 10:01

## 2017-01-10 RX ADMIN — POTASSIUM & SODIUM PHOSPHATES POWDER PACK 280-160-250 MG 1 PACKET: 280-160-250 PACK at 12:01

## 2017-01-10 RX ADMIN — POTASSIUM & SODIUM PHOSPHATES POWDER PACK 280-160-250 MG 1 PACKET: 280-160-250 PACK at 07:01

## 2017-01-10 NOTE — PLAN OF CARE
Problem: Patient Care Overview  Goal: Plan of Care Review  Outcome: Ongoing (interventions implemented as appropriate)  Pt admitted to unit d/t DKA. Pt with insulin gtt infusing q1hr accuchecks. Titrating gtt as ordered. Will con't to monitor.  Goal: Individualization & Mutuality  Outcome: Ongoing (interventions implemented as appropriate)  Past Medical History   Diagnosis Date    Diabetes mellitus      Irregular heartbeat       Past Surgical History   Procedure Laterality Date     section, low transverse   2011

## 2017-01-10 NOTE — PLAN OF CARE
Sw covering for IML today. Sw spoke to MD about d/c needs. No Sw needs identified at this time. Sw will continue to follow.      Shelby Bernstein, NICK [id number]

## 2017-01-10 NOTE — PLAN OF CARE
met with pt and obtained assessment information.    Pt AA&Ox4, receiving Insulin drip.  Reports abd pain has lessened.  Pt has good family support.  No anticipated HH or HME needs.    PCP - Levi Doyle MD     Pharmacy [facility] [id number] - [place], [place] - [address] formerly [facility] [address]  [address]  [facility] [place] [address]  Phone: [phone] Fax: [phone]         01/10/17 1130   Discharge Assessment   Assessment Type Discharge Planning Assessment   Confirmed/corrected address and phone number on facesheet? Yes   Assessment information obtained from? Patient;Caregiver;Medical Record   Prior to hospitilization cognitive status: Alert/Oriented   Prior to hospitalization functional status: Independent   Current cognitive status: Alert/Oriented   Current Functional Status: Independent   Arrived From home or self-care   Lives With spouse   Able to Return to Prior Arrangements yes   Is patient able to care for self after discharge? Yes   How many people do you have in your home that can help with your care after discharge? 1   Who are your caregiver(s) and their phone number(s)? Spouse,  Ladarius Lee  [phone]   Patient's perception of discharge disposition home or selfcare   Readmission Within The Last 30 Days no previous admission in last 30 days   Patient currently being followed by outpatient case management? No   Patient currently receives home health services? No   Does the patient currently use HME? No   Patient currently receives any other outside agency services? No   Equipment Currently Used at Home none   Do you have any problems affording any of your prescribed medications? No   Is the patient taking medications as prescribed? yes   Do you have any financial concerns preventing you from receiving the healthcare you need? No   Does the patient have transportation to healthcare appointments? Yes   Transportation Available  family or friend will provide;car   On Dialysis? No   Does the patient receive services at the Coumadin Clinic? No   Discharge Plan A Home with family   Discharge Plan B Home   Patient/Family In Agreement With Plan yes

## 2017-01-10 NOTE — PLAN OF CARE
Problem: Patient Care Overview  Goal: Plan of Care Review  Outcome: Ongoing (interventions implemented as appropriate)  Pt off insulin gtt. In process of transitioning care. Remains on q4h BMP and phos. Will con't to monitor. No acute events throughout the day, VS and assessment per flow sheet, patient progressing towards goal as tolerated. Plan of care reviewed with Jimena Martintle and family, all concerns addressed. Will continue to monitor.

## 2017-01-10 NOTE — PLAN OF CARE
Problem: Diabetes, Type 1 (Adult)  Goal: Signs and Symptoms of Listed Potential Problems Will be Absent, Minimized or Managed (Diabetes, Type 1)  Signs and symptoms of listed potential problems will be absent, minimized or managed by discharge/transition of care (reference Diabetes, Type 1 (Adult) CPG).   Pt AAOx4. Ambulatory and independent. Remains free from falls. Remains on insulin drip currently infusing at 0.3 units/hr. Q 1 hour glucose checks continued and algorithim followed. D5 with 1/2 NS infusing at 75 ml/hr. Pt receiving electrolyte replacement for phos of 1.7. Denies pain when asked other then a HA at start of shift. No other issues noted.  at bedside. Will continue to monitor.

## 2017-01-11 VITALS
WEIGHT: 140 LBS | TEMPERATURE: 99 F | OXYGEN SATURATION: 100 % | SYSTOLIC BLOOD PRESSURE: 101 MMHG | RESPIRATION RATE: 16 BRPM | DIASTOLIC BLOOD PRESSURE: 60 MMHG | BODY MASS INDEX: 23.32 KG/M2 | HEART RATE: 79 BPM | HEIGHT: 65 IN

## 2017-01-11 PROBLEM — E87.6 HYPOKALEMIA: Status: ACTIVE | Noted: 2017-01-11

## 2017-01-11 PROBLEM — E87.1 HYPONATREMIA: Status: RESOLVED | Noted: 2017-01-09 | Resolved: 2017-01-11

## 2017-01-11 PROBLEM — N17.9 AKI (ACUTE KIDNEY INJURY): Status: RESOLVED | Noted: 2017-01-09 | Resolved: 2017-01-11

## 2017-01-11 LAB
ANION GAP SERPL CALC-SCNC: 10 MMOL/L
ANION GAP SERPL CALC-SCNC: 15 MMOL/L
BUN SERPL-MCNC: 8 MG/DL
BUN SERPL-MCNC: 9 MG/DL
CALCIUM SERPL-MCNC: 8.4 MG/DL
CALCIUM SERPL-MCNC: 8.5 MG/DL
CHLORIDE SERPL-SCNC: 107 MMOL/L
CHLORIDE SERPL-SCNC: 107 MMOL/L
CO2 SERPL-SCNC: 11 MMOL/L
CO2 SERPL-SCNC: 17 MMOL/L
CREAT SERPL-MCNC: 0.8 MG/DL
CREAT SERPL-MCNC: 1 MG/DL
EST. GFR  (AFRICAN AMERICAN): >60 ML/MIN/1.73 M^2
EST. GFR  (AFRICAN AMERICAN): >60 ML/MIN/1.73 M^2
EST. GFR  (NON AFRICAN AMERICAN): >60 ML/MIN/1.73 M^2
EST. GFR  (NON AFRICAN AMERICAN): >60 ML/MIN/1.73 M^2
GLUCOSE SERPL-MCNC: 262 MG/DL
GLUCOSE SERPL-MCNC: 311 MG/DL
PHOSPHATE SERPL-MCNC: 1.8 MG/DL
PHOSPHATE SERPL-MCNC: 2.3 MG/DL
POCT GLUCOSE: 228 MG/DL (ref 70–110)
POCT GLUCOSE: 259 MG/DL (ref 70–110)
POTASSIUM SERPL-SCNC: 3.2 MMOL/L
POTASSIUM SERPL-SCNC: 4.3 MMOL/L
SODIUM SERPL-SCNC: 133 MMOL/L
SODIUM SERPL-SCNC: 134 MMOL/L

## 2017-01-11 PROCEDURE — 84100 ASSAY OF PHOSPHORUS: CPT

## 2017-01-11 PROCEDURE — 25000003 PHARM REV CODE 250: Performed by: INTERNAL MEDICINE

## 2017-01-11 PROCEDURE — 25000003 PHARM REV CODE 250: Performed by: HOSPITALIST

## 2017-01-11 PROCEDURE — 80048 BASIC METABOLIC PNL TOTAL CA: CPT

## 2017-01-11 PROCEDURE — 99239 HOSP IP/OBS DSCHRG MGMT >30: CPT | Mod: ,,, | Performed by: INTERNAL MEDICINE

## 2017-01-11 PROCEDURE — 36415 COLL VENOUS BLD VENIPUNCTURE: CPT

## 2017-01-11 RX ORDER — INSULIN ASPART 100 [IU]/ML
8 INJECTION, SOLUTION INTRAVENOUS; SUBCUTANEOUS
Qty: 1 BOX | Refills: 2 | Status: SHIPPED | OUTPATIENT
Start: 2017-01-11 | End: 2017-09-04 | Stop reason: SDUPTHER

## 2017-01-11 RX ORDER — SODIUM,POTASSIUM PHOSPHATES 280-250MG
1 POWDER IN PACKET (EA) ORAL
Refills: 0 | COMMUNITY
Start: 2017-01-11 | End: 2017-01-12

## 2017-01-11 RX ORDER — POTASSIUM CHLORIDE 20 MEQ/1
40 TABLET, EXTENDED RELEASE ORAL ONCE
Status: COMPLETED | OUTPATIENT
Start: 2017-01-11 | End: 2017-01-11

## 2017-01-11 RX ADMIN — POTASSIUM CHLORIDE 40 MEQ: 1500 TABLET, EXTENDED RELEASE ORAL at 10:01

## 2017-01-11 RX ADMIN — INSULIN ASPART 3 UNITS: 100 INJECTION, SOLUTION INTRAVENOUS; SUBCUTANEOUS at 10:01

## 2017-01-11 RX ADMIN — INSULIN ASPART 8 UNITS: 100 INJECTION, SOLUTION INTRAVENOUS; SUBCUTANEOUS at 10:01

## 2017-01-11 RX ADMIN — POTASSIUM & SODIUM PHOSPHATES POWDER PACK 280-160-250 MG 1 PACKET: 280-160-250 PACK at 10:01

## 2017-01-11 RX ADMIN — SODIUM CHLORIDE, PRESERVATIVE FREE 3 ML: 5 INJECTION INTRAVENOUS at 10:01

## 2017-01-11 NOTE — ASSESSMENT & PLAN NOTE
Likely due to poor nutritional intake and intracellular shift from insulin therapy.  1. Potassium chloride 40 mEq tablet once today.

## 2017-01-11 NOTE — PROGRESS NOTES
"[facility] Hospital Medicine  Progress Note    Patient Name: Jimena Hazel  MRN: [id number]  Patient Class: IP- Inpatient   Admission Date: 1/9/2017  Length of Stay: 2 days  Expected Discharge Date: 1/11/2017  Attending Physician: Tiki Staton MD  Primary Care Provider: Levi Doyle MD    [facility] Medicine Team: [facility] Tiki Staton MD    Subjective:     Principal Problem:Diabetic ketoacidosis without coma associated with type 1 diabetes mellitus    HPI:  Patient is a 36 y.o. female with PMH of DM-I who presents to ED with complaints of abdominal pain, N/V x 3 days. Pt reports she was in her normal state of health, until gradual onset of mid-epigastric cramping abdominal pain. Progressively worsened, including development of non-bloody, non-bilious emesis. Associated with frontal HA, SOB and fatigue, and chronic polyuria. Pt denies fever, chills, cough, diarrhea, dysuria. Does note family member had viral URI recently. Reports compliance with insulin regimen (Levemir 10units BID and Aspart 6units TIDWM and SSI) except for at beginning of the year, due to insurance issues, missed several days of short-acting insulin (but still used Levemir).     Reports blood sugar usually in 200s. Sees Dr. Kc as outpatient. Last episode of DKA 1 year ago.      Hospital Course:  On hospital day one her DKA resolved as evidenced by the closed anion gap. However, patient continued to have metabolic acidosis and hyperglycemia. On hospital day two her hyperglycemia continued to improve yet not at recommended goals. Patient no longer experienced abdominal pain or nausea and tolerated diabetic diet. Her metabolic acidosis improved with bicarbonate drip.     Interval History: "Good. Ready to go home". No acute overnight events. DKA resolved. Severe metabolic acidosis improved with bicarbonate drip.     Review of Systems   Constitutional: Negative for chills, fatigue, fever and unexpected " weight change.   HENT: Negative for ear pain, facial swelling, hearing loss, mouth sores, nosebleeds, rhinorrhea, sinus pressure, sore throat, tinnitus, trouble swallowing and voice change.    Eyes: Negative for photophobia, pain, redness and visual disturbance.   Respiratory: Negative for cough, chest tightness, shortness of breath and wheezing.    Cardiovascular: Negative for chest pain, palpitations and leg swelling.   Gastrointestinal: Negative for abdominal pain, blood in stool, constipation, diarrhea, nausea and vomiting.   Endocrine: Negative for cold intolerance, heat intolerance, polydipsia, polyphagia and polyuria.   Genitourinary: Negative for decreased urine volume, dysuria, flank pain, frequency, hematuria, menstrual problem, urgency, vaginal bleeding, vaginal discharge and vaginal pain.   Musculoskeletal: Negative for arthralgias, back pain, joint swelling, myalgias and neck pain.   Skin: Negative for rash.   Allergic/Immunologic: Negative for environmental allergies, food allergies and immunocompromised state.   Neurological: Negative for dizziness, seizures, syncope, weakness, light-headedness, numbness and headaches.   Hematological: Negative for adenopathy. Does not bruise/bleed easily.   Psychiatric/Behavioral: Negative for confusion, hallucinations, self-injury, sleep disturbance and suicidal ideas. The patient is not nervous/anxious.      Objective:     Vital Signs (Most Recent):  Temp: 98.7 °F (37.1 °C) (01/11/17 0813)  Pulse: 78 (01/11/17 0900)  Resp: 16 (01/11/17 0813)  BP: 101/60 (01/11/17 0813)  SpO2: 100 % (01/11/17 0813) Vital Signs (24h Range):  Temp:  [98 °F (36.7 °C)-98.8 °F (37.1 °C)] 98.7 °F (37.1 °C)  Pulse:  [] 78  Resp:  [16-20] 16  BP: ()/(60-72) 101/60     Weight: 63.5 kg (140 lb)  Body mass index is 23.3 kg/(m^2).    Intake/Output Summary (Last 24 hours) at 01/11/17 0953  Last data filed at 01/11/17 0000   Gross per 24 hour   Intake             1400 ml   Output                 0 ml   Net             1400 ml      Physical Exam   Constitutional: She is oriented to person, place, and time. She appears well-developed and well-nourished. No distress.   HENT:   Head: Normocephalic and atraumatic.   Right Ear: Hearing, tympanic membrane, external ear and ear canal normal.   Left Ear: Hearing, tympanic membrane, external ear and ear canal normal.   Nose: Nose normal.   Mouth/Throat: Uvula is midline, oropharynx is clear and moist and mucous membranes are normal. No oropharyngeal exudate.   Eyes: Conjunctivae and EOM are normal. Pupils are equal, round, and reactive to light. Right eye exhibits no discharge. Left eye exhibits no discharge. No scleral icterus.   Neck: Normal range of motion. Neck supple. No tracheal deviation present. No thyromegaly present.   Cardiovascular: Normal rate, regular rhythm, normal heart sounds and intact distal pulses.  Exam reveals no gallop and no friction rub.    No murmur heard.  Pulmonary/Chest: Effort normal and breath sounds normal. No stridor. No respiratory distress. She has no wheezes. She has no rales. She exhibits no tenderness.   Abdominal: Soft. Bowel sounds are normal. She exhibits no distension. There is no tenderness. There is no rebound and no guarding.   Musculoskeletal: Normal range of motion. She exhibits no edema or tenderness.   Lymphadenopathy:     She has no cervical adenopathy.   Neurological: She is alert and oriented to person, place, and time. No cranial nerve deficit. Coordination normal.   Skin: Skin is warm and dry. No rash noted. She is not diaphoretic. No erythema. No pallor.   Psychiatric: She has a normal mood and affect. Her behavior is normal. Judgment and thought content normal.   Nursing note and vitals reviewed.      Significant Labs:   BMP:   Recent Labs  Lab 01/11/17  0415   *   *   K 3.2*      CO2 17*   BUN 8   CREATININE 0.8   CALCIUM 8.4*     CBC: No results for input(s): WBC, HGB, HCT, PLT in  the last 48 hours.  POCT Glucose:   Recent Labs  Lab 01/10/17  1206 01/10/17  1944 01/11/17  0932   POCTGLUCOSE 306* 282* 259*       Significant Imaging: I have reviewed all pertinent imaging results/findings within the past 24 hours.    Assessment/Plan:      * Diabetic ketoacidosis without coma associated with type 1 diabetes mellitus  DKA resolved as evidenced by normal anion gap. Continues with hyperglycemia in the 200 range. Tolerating diabetic diet with abdominal pain and nausea resolved.   1. Insulin aspart 8 units TID with meals to start today.  2. Increased insulin detemir 16 units BID.  3. Continue to monitor glucose levels AC and HS.  4. Patient medically stable and requesting discharge. Oriented on need to keep glucose log.   5. Will continue previously prescribed correction SSI.  6. Outpatient follow up in Endocrinology clinic for further insulin titration.    Type 1 diabetes mellitus with hyperglycemia  As above.      Long-term current use of insulin for diabetes mellitus  As above      NICHOL (acute kidney injury)  Resolved after volume repletion.  1. Monitor.      Hyponatremia  Pseudohyponatremia due to hyperglycemia.   1. Correct hyperglycemia.      Metabolic acidosis with normal anion gap and bicarbonate losses  Etiology of metabolic acidosis unclear at this time. Likely multifactorial. Improved and no longer with severe metabolic acidosis.   1. Discontinue bicarbonate drip.      Hypophosphatemia  Likely from poor nutritional intake. Improving after replacement.   1. Continue oral phosphate replacement for 24 more hours.        Hypokalemia  Likely due to poor nutritional intake and intracellular shift from insulin therapy.  1. Potassium chloride 40 mEq tablet once today.      VTE Risk Mitigation         Ordered     enoxaparin injection 40 mg  Daily     Route:  Subcutaneous        01/09/17 1040     Medium Risk of VTE  Once      01/09/17 1040     Place DAYNA hose  Until discontinued      01/09/17 0998           Tiki Staton MD  Department of Hospital Medicine   [facility]

## 2017-01-11 NOTE — SUBJECTIVE & OBJECTIVE
"Interval History: "Good. Ready to go home". No acute overnight events. DKA resolved. Severe metabolic acidosis improved with bicarbonate drip.     Review of Systems   Constitutional: Negative for chills, fatigue, fever and unexpected weight change.   HENT: Negative for ear pain, facial swelling, hearing loss, mouth sores, nosebleeds, rhinorrhea, sinus pressure, sore throat, tinnitus, trouble swallowing and voice change.    Eyes: Negative for photophobia, pain, redness and visual disturbance.   Respiratory: Negative for cough, chest tightness, shortness of breath and wheezing.    Cardiovascular: Negative for chest pain, palpitations and leg swelling.   Gastrointestinal: Negative for abdominal pain, blood in stool, constipation, diarrhea, nausea and vomiting.   Endocrine: Negative for cold intolerance, heat intolerance, polydipsia, polyphagia and polyuria.   Genitourinary: Negative for decreased urine volume, dysuria, flank pain, frequency, hematuria, menstrual problem, urgency, vaginal bleeding, vaginal discharge and vaginal pain.   Musculoskeletal: Negative for arthralgias, back pain, joint swelling, myalgias and neck pain.   Skin: Negative for rash.   Allergic/Immunologic: Negative for environmental allergies, food allergies and immunocompromised state.   Neurological: Negative for dizziness, seizures, syncope, weakness, light-headedness, numbness and headaches.   Hematological: Negative for adenopathy. Does not bruise/bleed easily.   Psychiatric/Behavioral: Negative for confusion, hallucinations, self-injury, sleep disturbance and suicidal ideas. The patient is not nervous/anxious.      Objective:     Vital Signs (Most Recent):  Temp: 98.7 °F (37.1 °C) (01/11/17 0813)  Pulse: 78 (01/11/17 0900)  Resp: 16 (01/11/17 0813)  BP: 101/60 (01/11/17 0813)  SpO2: 100 % (01/11/17 0813) Vital Signs (24h Range):  Temp:  [98 °F (36.7 °C)-98.8 °F (37.1 °C)] 98.7 °F (37.1 °C)  Pulse:  [] 78  Resp:  [16-20] 16  BP: " ()/(60-72) 101/60     Weight: 63.5 kg (140 lb)  Body mass index is 23.3 kg/(m^2).    Intake/Output Summary (Last 24 hours) at 01/11/17 0953  Last data filed at 01/11/17 0000   Gross per 24 hour   Intake             1400 ml   Output                0 ml   Net             1400 ml      Physical Exam   Constitutional: She is oriented to person, place, and time. She appears well-developed and well-nourished. No distress.   HENT:   Head: Normocephalic and atraumatic.   Right Ear: Hearing, tympanic membrane, external ear and ear canal normal.   Left Ear: Hearing, tympanic membrane, external ear and ear canal normal.   Nose: Nose normal.   Mouth/Throat: Uvula is midline, oropharynx is clear and moist and mucous membranes are normal. No oropharyngeal exudate.   Eyes: Conjunctivae and EOM are normal. Pupils are equal, round, and reactive to light. Right eye exhibits no discharge. Left eye exhibits no discharge. No scleral icterus.   Neck: Normal range of motion. Neck supple. No tracheal deviation present. No thyromegaly present.   Cardiovascular: Normal rate, regular rhythm, normal heart sounds and intact distal pulses.  Exam reveals no gallop and no friction rub.    No murmur heard.  Pulmonary/Chest: Effort normal and breath sounds normal. No stridor. No respiratory distress. She has no wheezes. She has no rales. She exhibits no tenderness.   Abdominal: Soft. Bowel sounds are normal. She exhibits no distension. There is no tenderness. There is no rebound and no guarding.   Musculoskeletal: Normal range of motion. She exhibits no edema or tenderness.   Lymphadenopathy:     She has no cervical adenopathy.   Neurological: She is alert and oriented to person, place, and time. No cranial nerve deficit. Coordination normal.   Skin: Skin is warm and dry. No rash noted. She is not diaphoretic. No erythema. No pallor.   Psychiatric: She has a normal mood and affect. Her behavior is normal. Judgment and thought content normal.    Nursing note and vitals reviewed.      Significant Labs:   BMP:   Recent Labs  Lab 01/11/17  0415   *   *   K 3.2*      CO2 17*   BUN 8   CREATININE 0.8   CALCIUM 8.4*     CBC: No results for input(s): WBC, HGB, HCT, PLT in the last 48 hours.  POCT Glucose:   Recent Labs  Lab 01/10/17  1206 01/10/17  1944 01/11/17  0932   POCTGLUCOSE 306* 282* 259*       Significant Imaging: I have reviewed all pertinent imaging results/findings within the past 24 hours.

## 2017-01-11 NOTE — ASSESSMENT & PLAN NOTE
Likely from poor nutritional intake.   1. Continue oral phosphate replacement.  2. Monitor phosphate level.

## 2017-01-11 NOTE — ASSESSMENT & PLAN NOTE
DKA now resolved as evidenced by normal anion gap. Continues with hyperglycemia in the 200 range. Tolerating clear liquid diet thus far. Patient initially transitioned to prandial insulin 6 units and basal 10 units BID. However throughout the day with hyperglycemia.   1. Insulin aspart 8 units TID with meals.  2. Insulin detemir 14 units BID.  3. Low dose correction SSI.  4. Continue to monitor FBS and POCT glucose AC & HS.  5. Diabetic diet 1800 calories.

## 2017-01-11 NOTE — PLAN OF CARE
Per Dr Shemar Staton, pt being dc today and needs f/u with endo dr prince if available.  Future Appointments  Date Time Provider Department Center   1/12/2017 11:00 AM Chad Angel MD [facility] ENDOCRN [facility]     Pt informed and in agreement     01/11/17 1028   Final Note   Assessment Type Final Discharge Note   Discharge Disposition Home   Hospital Follow Up  Appt(s) scheduled? Yes   Discharge plans and expectations educations in teach back method with documentation complete? Yes   Offered [facility] -- Bedside Delivery? Yes   Discharge/Hospital Encounter Summary to (non-[facility]) PCP n/a   Referral to Outpatient Case Management complete? n/a   Referral to / orders for Home Health Complete? n/a   30 day supply of medicines given at discharge, if documented non-compliance / non-adherence? n/a   Any social issues identified prior to discharge? n/a   Did you assess the readiness or willingness of the family or caregiver to support self management of care? Yes

## 2017-01-11 NOTE — ASSESSMENT & PLAN NOTE
Etiology of metabolic acidosis unclear at this time. Likely multifactorial. Improved and no longer with severe metabolic acidosis.   1. Discontinue bicarbonate drip.

## 2017-01-11 NOTE — ASSESSMENT & PLAN NOTE
Likely from poor nutritional intake. Improving after replacement.   1. Continue oral phosphate replacement for 24 more hours.

## 2017-01-11 NOTE — PLAN OF CARE
Problem: Patient Care Overview  Goal: Plan of Care Review  Outcome: Ongoing (interventions implemented as appropriate)  Plan of care reviewed with patient and spouse at bedside. Repeat education given on insulin administration per pt request. CBG at HS was 282. Vitals stable overnight. No acute events.

## 2017-01-11 NOTE — DISCHARGE SUMMARY
[facility] Hospital Medicine  Discharge Summary      Patient Name: Jimena Hazel  MRN: [id number]  Admission Date: 1/9/2017  Hospital Length of Stay: 2 days  Discharge Date and Time: 1/11/2017 10:41 AM  Attending Physician: Tiki Staton MD   Discharging Provider: Tiki Staton MD  Primary Care Provider: Levi Doyle MD    [facility] Medicine Team: [facility] MED L Tiki Staton MD    HPI: Patient is a 36 y.o. female with PMH of DM-I who presents to ED with complaints of abdominal pain, N/V x 3 days. Pt reports she was in her normal state of health, until gradual onset of mid-epigastric cramping abdominal pain. Progressively worsened, including development of non-bloody, non-bilious emesis. Associated with frontal HA, SOB and fatigue, and chronic polyuria. Pt denies fever, chills, cough, diarrhea, dysuria. Does note family member had viral URI recently. Reports compliance with insulin regimen (Levemir 10units BID and Aspart 6units TIDWM and SSI) except for at beginning of the year, due to insurance issues, missed several days of short-acting insulin (but still used Levemir).      Reports blood sugar usually in 200s. Sees Dr. Kc as outpatient. Last episode of DKA 1 year ago.    * No surgery found *      Indwelling Lines/Drains at time of discharge:   Lines/Drains/Airways          No matching active lines, drains, or airways        Hospital Course: On hospital day one her DKA resolved as evidenced by the closed anion gap. However, patient continued to have metabolic acidosis and hyperglycemia. On hospital day two her hyperglycemia continued to improve yet not at recommended goals. Patient no longer experienced abdominal pain or nausea and tolerated diabetic diet. Her metabolic acidosis improved with bicarbonate drip. Patient medically stable and requesting discharge.     Consults:     Significant Diagnostic Studies: Labs:   BMP:   Recent Labs  Lab 01/10/17  8888  01/10/17  1737 01/11/17  0415   * 311* 262*   * 133* 134*   K 3.8 4.3 3.2*    107 107   CO2 13* 11* 17*   BUN 8 9 8   CREATININE 1.0 1.0 0.8   CALCIUM 8.5* 8.5* 8.4*   , CBC No results for input(s): WBC, HGB, HCT, PLT in the last 48 hours.     Pending Diagnostic Studies:     None        Final Active Diagnoses:    Diagnosis Date Noted POA    PRINCIPAL PROBLEM:  Diabetic ketoacidosis without coma associated with type 1 diabetes mellitus [E10.10] 01/09/2016 Yes    Hypokalemia [E87.6] 01/11/2017 Unknown    Metabolic acidosis with normal anion gap and bicarbonate losses [E87.2] 01/10/2017 Yes    Hypophosphatemia [E83.39] 01/10/2017 Yes    NICHOL (acute kidney injury) [N17.9] 01/09/2017 Yes    Hyponatremia [E87.1] 01/09/2017 Yes    Type 1 diabetes mellitus with hyperglycemia [E10.65] 01/11/2016 Yes    Long-term current use of insulin for diabetes mellitus [Z79.4, E11.9] 01/11/2016 Not Applicable      Problems Resolved During this Admission:    Diagnosis Date Noted Date Resolved POA      Discharged Condition: good    Disposition: Home or Self Care    Follow Up:  Follow-up Information     Follow up with [facility]/Diab/Metab On 1/12/2017.    Specialty:  Endocrinology    Contact information:    [id number]  [facility] [address] [phone]    Additional information:    [address]th Floor        Patient Instructions:   No discharge procedures on file.  Medications:  Reconciled Home Medications:   Current Discharge Medication List      START taking these medications    Details   potassium, sodium phosphates (PHOS-NAK) 280-160-250 mg PwPk Take 1 packet by mouth 4 (four) times daily with meals and nightly.  Refills: 0         CONTINUE these medications which have CHANGED    Details   insulin aspart (NOVOLOG) 100 unit/mL InPn pen Inject 8 Units into the skin 3 (three) times daily with meals.  Qty: 1 Box, Refills: 2      insulin detemir (LEVEMIR FLEXTOUCH) 100 unit/mL (3 mL) SubQ InPn pen  "Inject 16 Units into the skin 2 (two) times daily.  Qty: 1 Box, Refills: 1         CONTINUE these medications which have NOT CHANGED    Details   acetaminophen (TYLENOL) 325 MG tablet Take 500 mg by mouth every 6 (six) hours as needed for Pain.      blood sugar diagnostic Strp Check glucose 4x/day  Qty: 150 strip, Refills: 11      metoclopramide HCl (REGLAN) 10 MG tablet Take 1 tablet (10 mg total) by mouth 3 (three) times daily as needed (nausea).  Qty: 90 tablet, Refills: 1    Associated Diagnoses: Nausea      pen needle, diabetic 31 gauge x 5/16" Ndle Insulin 4x /day  Qty: 150 each, Refills: 11         STOP taking these medications       ibuprofen (ADVIL,MOTRIN) 600 MG tablet Comments:   Reason for Stopping:             Time spent on the discharge of patient: 45 minutes    Tiki Staton MD  Department of Hospital Medicine  [facility]    "

## 2017-01-11 NOTE — SUBJECTIVE & OBJECTIVE
"Interval History: "I'm hungry". No acute overnight events. Anion gap closed however continues with metabolic acidosis and hyperglycemia.    Review of Systems   Constitutional: Negative for chills, fatigue, fever and unexpected weight change.   HENT: Negative for ear pain, facial swelling, hearing loss, mouth sores, nosebleeds, rhinorrhea, sinus pressure, sore throat, tinnitus, trouble swallowing and voice change.    Eyes: Negative for photophobia, pain, redness and visual disturbance.   Respiratory: Negative for cough, chest tightness, shortness of breath and wheezing.    Cardiovascular: Negative for chest pain, palpitations and leg swelling.   Gastrointestinal: Negative for abdominal pain, blood in stool, constipation, diarrhea, nausea and vomiting.   Endocrine: Negative for cold intolerance, heat intolerance, polydipsia, polyphagia and polyuria.   Genitourinary: Negative for decreased urine volume, dysuria, flank pain, frequency, hematuria, menstrual problem, urgency, vaginal bleeding, vaginal discharge and vaginal pain.   Musculoskeletal: Negative for arthralgias, back pain, joint swelling, myalgias and neck pain.   Skin: Negative for rash.   Allergic/Immunologic: Negative for environmental allergies, food allergies and immunocompromised state.   Neurological: Negative for dizziness, seizures, syncope, weakness, light-headedness, numbness and headaches.   Hematological: Negative for adenopathy. Does not bruise/bleed easily.   Psychiatric/Behavioral: Negative for confusion, hallucinations, self-injury, sleep disturbance and suicidal ideas. The patient is not nervous/anxious.      Objective:     Vital Signs (Most Recent):  Temp: 98.6 °F (37 °C) (01/10/17 2013)  Pulse: 90 (01/10/17 2013)  Resp: 16 (01/10/17 2013)  BP: 98/61 (01/10/17 2013)  SpO2: 99 % (01/10/17 2013) Vital Signs (24h Range):  Temp:  [97.6 °F (36.4 °C)-99 °F (37.2 °C)] 98.6 °F (37 °C)  Pulse:  [] 90  Resp:  [16-20] 16  BP: ()/(55-72) " 98/61     Weight: 63.5 kg (140 lb)  Body mass index is 23.3 kg/(m^2).    Intake/Output Summary (Last 24 hours) at 01/10/17 2233  Last data filed at 01/10/17 1700   Gross per 24 hour   Intake          3209.43 ml   Output                0 ml   Net          3209.43 ml      Physical Exam   Constitutional: She is oriented to person, place, and time. She appears well-developed and well-nourished. No distress.   HENT:   Head: Normocephalic and atraumatic.   Right Ear: Hearing, tympanic membrane, external ear and ear canal normal.   Left Ear: Hearing, tympanic membrane, external ear and ear canal normal.   Nose: Nose normal.   Mouth/Throat: Uvula is midline, oropharynx is clear and moist and mucous membranes are normal. No oropharyngeal exudate.   Eyes: Conjunctivae and EOM are normal. Pupils are equal, round, and reactive to light. Right eye exhibits no discharge. Left eye exhibits no discharge. No scleral icterus.   Neck: Normal range of motion. Neck supple. No tracheal deviation present. No thyromegaly present.   Cardiovascular: Normal rate, regular rhythm, normal heart sounds and intact distal pulses.  Exam reveals no gallop and no friction rub.    No murmur heard.  Pulmonary/Chest: Effort normal and breath sounds normal. No stridor. No respiratory distress. She has no wheezes. She has no rales. She exhibits no tenderness.   Abdominal: Soft. Bowel sounds are normal. She exhibits no distension. There is no tenderness. There is no rebound and no guarding.   Musculoskeletal: Normal range of motion. She exhibits no edema or tenderness.   Lymphadenopathy:     She has no cervical adenopathy.   Neurological: She is alert and oriented to person, place, and time. No cranial nerve deficit. Coordination normal.   Skin: Skin is warm and dry. No rash noted. She is not diaphoretic. No erythema. No pallor.   Psychiatric: She has a normal mood and affect. Her behavior is normal. Judgment and thought content normal.   Nursing note and  vitals reviewed.      Significant Labs:   BMP:   Recent Labs  Lab 01/09/17  0912  01/10/17  1547   *  < > 323*   *  < > 131*   K 5.0  < > 3.8   CL 97  < > 107   CO2 6*  < > 13*   BUN 11  < > 8   CREATININE 1.6*  < > 1.0   CALCIUM 9.4  < > 8.5*   MG 2.8*  --   --    < > = values in this interval not displayed.  CBC:   Recent Labs  Lab 01/09/17  0912   WBC 8.59   HGB 15.4   HCT 44.5        POCT Glucose:   Recent Labs  Lab 01/10/17  1006 01/10/17  1206 01/10/17  1944   POCTGLUCOSE 358* 306* 282*       Significant Imaging: I have reviewed all pertinent imaging results/findings within the past 24 hours.

## 2017-01-11 NOTE — ASSESSMENT & PLAN NOTE
Etiology of metabolic acidosis unclear at this time. Likely multifactorial.  1. Start bicarbonate drip 75 ml/hr.  2. Monitor.

## 2017-01-11 NOTE — ASSESSMENT & PLAN NOTE
DKA resolved as evidenced by normal anion gap. Continues with hyperglycemia in the 200 range. Tolerating diabetic diet with abdominal pain and nausea resolved.   1. Insulin aspart 8 units TID with meals to start today.  2. Increased insulin detemir 16 units BID.  3. Continue to monitor glucose levels AC and HS.  4. Patient medically stable and requesting discharge. Oriented on need to keep glucose log.   5. Will continue previously prescribed correction SSI.  6. Outpatient follow up in Endocrinology clinic for further insulin titration.

## 2017-01-11 NOTE — PROGRESS NOTES
"[facility] Hospital Medicine  Progress Note    Patient Name: Jimena Hazel  MRN: [id number]  Patient Class: IP- Inpatient   Admission Date: 1/9/2017  Length of Stay: 1 days  Expected Discharge Date: 1/11/2017  Attending Physician: Tiki Staton MD  Primary Care Provider: Levi Doyle MD    [facility] Medicine Team: [facility]OSP MED L Tiki Staton MD    Subjective:     Principal Problem:Diabetic ketoacidosis without coma associated with type 1 diabetes mellitus    HPI:  Patient is a 36 y.o. female with PMH of DM-I who presents to ED with complaints of abdominal pain, N/V x 3 days. Pt reports she was in her normal state of health, until gradual onset of mid-epigastric cramping abdominal pain. Progressively worsened, including development of non-bloody, non-bilious emesis. Associated with frontal HA, SOB and fatigue, and chronic polyuria. Pt denies fever, chills, cough, diarrhea, dysuria. Does note family member had viral URI recently. Reports compliance with insulin regimen (Levemir 10units BID and Aspart 6units TIDWM and SSI) except for at beginning of the year, due to insurance issues, missed several days of short-acting insulin (but still used Levemir).     Reports blood sugar usually in 200s. Sees Dr. Kc as outpatient. Last episode of DKA 1 year ago.      Hospital Course:  On hospital day one her DKA resolved as evidenced by the closed anion gap. However, patient continued to have metabolic acidosis and hyperglycemia.     Interval History: "I'm hungry". No acute overnight events. Anion gap closed however continues with metabolic acidosis and hyperglycemia.    Review of Systems   Constitutional: Negative for chills, fatigue, fever and unexpected weight change.   HENT: Negative for ear pain, facial swelling, hearing loss, mouth sores, nosebleeds, rhinorrhea, sinus pressure, sore throat, tinnitus, trouble swallowing and voice change.    Eyes: Negative for photophobia, pain, " redness and visual disturbance.   Respiratory: Negative for cough, chest tightness, shortness of breath and wheezing.    Cardiovascular: Negative for chest pain, palpitations and leg swelling.   Gastrointestinal: Negative for abdominal pain, blood in stool, constipation, diarrhea, nausea and vomiting.   Endocrine: Negative for cold intolerance, heat intolerance, polydipsia, polyphagia and polyuria.   Genitourinary: Negative for decreased urine volume, dysuria, flank pain, frequency, hematuria, menstrual problem, urgency, vaginal bleeding, vaginal discharge and vaginal pain.   Musculoskeletal: Negative for arthralgias, back pain, joint swelling, myalgias and neck pain.   Skin: Negative for rash.   Allergic/Immunologic: Negative for environmental allergies, food allergies and immunocompromised state.   Neurological: Negative for dizziness, seizures, syncope, weakness, light-headedness, numbness and headaches.   Hematological: Negative for adenopathy. Does not bruise/bleed easily.   Psychiatric/Behavioral: Negative for confusion, hallucinations, self-injury, sleep disturbance and suicidal ideas. The patient is not nervous/anxious.      Objective:     Vital Signs (Most Recent):  Temp: 98.6 °F (37 °C) (01/10/17 2013)  Pulse: 90 (01/10/17 2013)  Resp: 16 (01/10/17 2013)  BP: 98/61 (01/10/17 2013)  SpO2: 99 % (01/10/17 2013) Vital Signs (24h Range):  Temp:  [97.6 °F (36.4 °C)-99 °F (37.2 °C)] 98.6 °F (37 °C)  Pulse:  [] 90  Resp:  [16-20] 16  BP: ()/(55-72) 98/61     Weight: 63.5 kg (140 lb)  Body mass index is 23.3 kg/(m^2).    Intake/Output Summary (Last 24 hours) at 01/10/17 7563  Last data filed at 01/10/17 1700   Gross per 24 hour   Intake          3209.43 ml   Output                0 ml   Net          3209.43 ml      Physical Exam   Constitutional: She is oriented to person, place, and time. She appears well-developed and well-nourished. No distress.   HENT:   Head: Normocephalic and atraumatic.   Right  Ear: Hearing, tympanic membrane, external ear and ear canal normal.   Left Ear: Hearing, tympanic membrane, external ear and ear canal normal.   Nose: Nose normal.   Mouth/Throat: Uvula is midline, oropharynx is clear and moist and mucous membranes are normal. No oropharyngeal exudate.   Eyes: Conjunctivae and EOM are normal. Pupils are equal, round, and reactive to light. Right eye exhibits no discharge. Left eye exhibits no discharge. No scleral icterus.   Neck: Normal range of motion. Neck supple. No tracheal deviation present. No thyromegaly present.   Cardiovascular: Normal rate, regular rhythm, normal heart sounds and intact distal pulses.  Exam reveals no gallop and no friction rub.    No murmur heard.  Pulmonary/Chest: Effort normal and breath sounds normal. No stridor. No respiratory distress. She has no wheezes. She has no rales. She exhibits no tenderness.   Abdominal: Soft. Bowel sounds are normal. She exhibits no distension. There is no tenderness. There is no rebound and no guarding.   Musculoskeletal: Normal range of motion. She exhibits no edema or tenderness.   Lymphadenopathy:     She has no cervical adenopathy.   Neurological: She is alert and oriented to person, place, and time. No cranial nerve deficit. Coordination normal.   Skin: Skin is warm and dry. No rash noted. She is not diaphoretic. No erythema. No pallor.   Psychiatric: She has a normal mood and affect. Her behavior is normal. Judgment and thought content normal.   Nursing note and vitals reviewed.      Significant Labs:   BMP:   Recent Labs  Lab 01/09/17  0912  01/10/17  1547   *  < > 323*   *  < > 131*   K 5.0  < > 3.8   CL 97  < > 107   CO2 6*  < > 13*   BUN 11  < > 8   CREATININE 1.6*  < > 1.0   CALCIUM 9.4  < > 8.5*   MG 2.8*  --   --    < > = values in this interval not displayed.  CBC:   Recent Labs  Lab 01/09/17  0912   WBC 8.59   HGB 15.4   HCT 44.5        POCT Glucose:   Recent Labs  Lab 01/10/17  1006  01/10/17  1206 01/10/17  1944   POCTGLUCOSE 358* 306* 282*       Significant Imaging: I have reviewed all pertinent imaging results/findings within the past 24 hours.    Assessment/Plan:      * Diabetic ketoacidosis without coma associated with type 1 diabetes mellitus  DKA now resolved as evidenced by normal anion gap. Continues with hyperglycemia in the 200 range. Tolerating clear liquid diet thus far. Patient initially transitioned to prandial insulin 6 units and basal 10 units BID. However throughout the day with hyperglycemia.   1. Insulin aspart 8 units TID with meals.  2. Insulin detemir 14 units BID.  3. Low dose correction SSI.  4. Continue to monitor FBS and POCT glucose AC & HS.  5. Diabetic diet 1800 calories.      Type 1 diabetes mellitus with hyperglycemia  As above.      Long-term current use of insulin for diabetes mellitus  As above      NICHOL (acute kidney injury)  Resolved after volume repletion.  1. Monitor.      Hyponatremia  Pseudohyponatremia due to hyperglycemia.   1. Correct hyperglycemia and monitor sodium level.      Metabolic acidosis with normal anion gap and bicarbonate losses  Etiology of metabolic acidosis unclear at this time. Likely multifactorial.  1. Start bicarbonate drip 75 ml/hr.  2. Monitor.      Hypophosphatemia  Likely from poor nutritional intake.   1. Continue oral phosphate replacement.  2. Monitor phosphate level.      VTE Risk Mitigation         Ordered     enoxaparin injection 40 mg  Daily     Route:  Subcutaneous        01/09/17 1040     Medium Risk of VTE  Once      01/09/17 1040     Place DAYNA hose  Until discontinued      01/09/17 0982          Tiki Staton MD  Department of Hospital Medicine   [facility]

## 2017-01-12 NOTE — NURSING
Pt discharged to home with all education, orders, and prescriptions provided. All possessions accounted for. Pt declined wheel chair. IV and telemetry removed. AOx4. VSS.

## 2017-03-13 RX ORDER — PEN NEEDLE, DIABETIC 31 GX5/16"
NEEDLE, DISPOSABLE MISCELLANEOUS
Qty: 200 EACH | Refills: 3 | Status: SHIPPED | OUTPATIENT
Start: 2017-03-13 | End: 2017-09-06 | Stop reason: SDUPTHER

## 2017-08-02 ENCOUNTER — TELEPHONE (OUTPATIENT)
Dept: OBSTETRICS AND GYNECOLOGY | Facility: CLINIC | Age: 37
End: 2017-08-02

## 2017-08-02 NOTE — TELEPHONE ENCOUNTER
"Spoke with Ladarius. Ladarius states the pt is a  and not able to answer the phone most of the time. Ladarius tried contacting wife on another phone while I was on the line but the phone went to  after a few rings. Ladarius asked that I call the pt at # below and leave a  but he will also text her to call the office. Pt's  asked " how would I be able to speak for her in the future."  advised there would need to be a signed consent on file in her chart that gives us permission to speak with him.  voiced understanding    #[phone]    Called pt. No answer. Left message for CB  "

## 2017-08-02 NOTE — TELEPHONE ENCOUNTER
----- Message from Sunny Medellin sent at 8/2/2017  9:58 AM CDT -----  Contact: Eduarda Parikh ()  X_ 1st Request  _ 2nd Request  _ 3rd Request    Who:  Eduarda Parikh ()    Why: New patient would like to schedule her initial OB appointment, LMP 7/30; patient is type 1 diabetic. Please advise    What Number to Call Back: [phone]    When to Expect a call back: (Before the end of the day)  -- if call after 3:00 call back will be tomorrow.

## 2017-08-03 ENCOUNTER — TELEPHONE (OUTPATIENT)
Dept: OBSTETRICS AND GYNECOLOGY | Facility: CLINIC | Age: 37
End: 2017-08-03

## 2017-08-03 DIAGNOSIS — Z32.01 POSITIVE PREGNANCY TEST: Primary | ICD-10-CM

## 2017-08-03 NOTE — TELEPHONE ENCOUNTER
----- Message from Jennie Adams sent at 8/3/2017 12:11 PM CDT -----  Contact: Patient  X _1st Request  _  2nd Request  _  3rd Request    Who:NIKOLE HENNING [[id number]]    Why:Patient called to schedule her initial ob appointment LMP 06/30/2017    What Number to Call Back:[phone]    When to Expect a call back: (Before the end of the day)   -- if call after 3:00 call back will be tomorrow.

## 2017-08-03 NOTE — TELEPHONE ENCOUNTER
Orders placed!    Spoke with pt. Confirmed lmp. Appt offered/scheduled 8/31 at [facility] location. Pt is a  and would like the latest appt possible. Pt aware latest for GYNUS at 4pm. Pt aware time/location of both appt's will mail slip

## 2017-08-04 ENCOUNTER — TELEPHONE (OUTPATIENT)
Dept: OBSTETRICS AND GYNECOLOGY | Facility: CLINIC | Age: 37
End: 2017-08-04

## 2017-08-04 NOTE — TELEPHONE ENCOUNTER
Called pt. No answer. Left message for CB    Calling to r/s pt to an NP on 8-31 instead of  due to 's future schedule changes on Thursdays (Thais Montelongo has avail 8-31 at 3:00pm as well)

## 2017-08-31 ENCOUNTER — OFFICE VISIT (OUTPATIENT)
Dept: OBSTETRICS AND GYNECOLOGY | Facility: CLINIC | Age: 37
End: 2017-08-31
Payer: COMMERCIAL

## 2017-08-31 ENCOUNTER — TELEPHONE (OUTPATIENT)
Dept: OBSTETRICS AND GYNECOLOGY | Facility: CLINIC | Age: 37
End: 2017-08-31

## 2017-08-31 ENCOUNTER — PROCEDURE VISIT (OUTPATIENT)
Dept: OBSTETRICS AND GYNECOLOGY | Facility: CLINIC | Age: 37
End: 2017-08-31
Payer: COMMERCIAL

## 2017-08-31 VITALS
BODY MASS INDEX: 27.11 KG/M2 | WEIGHT: 162.94 LBS | DIASTOLIC BLOOD PRESSURE: 72 MMHG | SYSTOLIC BLOOD PRESSURE: 108 MMHG

## 2017-08-31 DIAGNOSIS — O34.219 HISTORY OF CESAREAN SECTION COMPLICATING PREGNANCY: ICD-10-CM

## 2017-08-31 DIAGNOSIS — Z32.01 POSITIVE PREGNANCY TEST: ICD-10-CM

## 2017-08-31 DIAGNOSIS — Z36.89 ESTABLISH GESTATIONAL AGE, ULTRASOUND: ICD-10-CM

## 2017-08-31 DIAGNOSIS — N91.2 AMENORRHEA: ICD-10-CM

## 2017-08-31 DIAGNOSIS — Z32.01 POSITIVE PREGNANCY TEST: Primary | ICD-10-CM

## 2017-08-31 LAB
CREAT UR-MCNC: 70 MG/DL
PROT UR-MCNC: <7 MG/DL
PROT/CREAT RATIO, UR: NORMAL

## 2017-08-31 PROCEDURE — 99999 PR PBB SHADOW E&M-EST. PATIENT-LVL III: CPT | Mod: PBBFAC,,, | Performed by: OBSTETRICS & GYNECOLOGY

## 2017-08-31 PROCEDURE — 3008F BODY MASS INDEX DOCD: CPT | Mod: S$GLB,,, | Performed by: OBSTETRICS & GYNECOLOGY

## 2017-08-31 PROCEDURE — 3046F HEMOGLOBIN A1C LEVEL >9.0%: CPT | Mod: S$GLB,,, | Performed by: OBSTETRICS & GYNECOLOGY

## 2017-08-31 PROCEDURE — 99204 OFFICE O/P NEW MOD 45 MIN: CPT | Mod: S$GLB,,, | Performed by: OBSTETRICS & GYNECOLOGY

## 2017-08-31 PROCEDURE — 84156 ASSAY OF PROTEIN URINE: CPT

## 2017-08-31 PROCEDURE — 88175 CYTOPATH C/V AUTO FLUID REDO: CPT

## 2017-08-31 PROCEDURE — 87591 N.GONORRHOEAE DNA AMP PROB: CPT

## 2017-08-31 PROCEDURE — 76817 TRANSVAGINAL US OBSTETRIC: CPT | Mod: S$GLB,,, | Performed by: OBSTETRICS & GYNECOLOGY

## 2017-08-31 PROCEDURE — 87086 URINE CULTURE/COLONY COUNT: CPT

## 2017-08-31 NOTE — PROGRESS NOTES
CC: Absence of menses    Jimena Hazel is a 36 y.o. female  presents with complaint of absence of menstruation.  She denies nausea/vomIting/abdominal pain/bleeding.  UPT is positive.       Patient following along with PCP for DM- she reports good BS-  Less than 150 for 2 hr PP.         Past Medical History:   Diagnosis Date    Diabetes mellitus     Irregular heartbeat      Past Surgical History:   Procedure Laterality Date     SECTION, LOW TRANSVERSE       Social History     Social History    Marital status:      Spouse name: N/A    Number of children: N/A    Years of education: N/A     Social History Main Topics    Smoking status: Never Smoker    Smokeless tobacco: Never Used    Alcohol use 0.6 oz/week     1 Glasses of wine per week      Comment: occasionally-prior to preg    Drug use: No    Sexual activity: Yes     Partners: Male     Birth control/ protection: None     Other Topics Concern    None     Social History Narrative    None     Family History   Problem Relation Age of Onset    Diabetes Mother     Hypertension Mother     Diabetes Father      OB History    Para Term  AB Living   2 2 2     2   SAB TAB Ectopic Multiple Live Births                  # Outcome Date GA Lbr Sunny/2nd Weight Sex Delivery Anes PTL Lv   2 Term      CS-Unspec EPI     1 Term      CS-Unspec EPI            /72   Wt 73.9 kg (162 lb 14.7 oz)   LMP 2017 (Approximate)   BMI 27.11 kg/m²     ROS:  GENERAL: Denies weight gain or weight loss. Feeling well overall.   SKIN: Denies rash or lesions.   HEAD: Denies head injury or headache.   NODES: Denies enlarged lymph nodes.   CHEST: Denies chest pain or shortness of breath.   CARDIOVASCULAR: Denies palpitations or left sided chest pain.   ABDOMEN: No abdominal pain, constipation, diarrhea, nausea, vomiting or rectal bleeding.   URINARY: No frequency, dysuria, hematuria, or burning on urination.  REPRODUCTIVE: See HPI.   BREASTS:  The patient performs breast self-examination and denies pain, lumps, or nipple discharge.   HEMATOLOGIC: No easy bruisability or excessive bleeding.   MUSCULOSKELETAL: Denies joint pain or swelling.   NEUROLOGIC: Denies syncope or weakness.   PSYCHIATRIC: Denies depression, anxiety or mood swings.    PE:   APPEARANCE: Well nourished, well developed, in no acute distress.  AFFECT: WNL, alert and oriented x 3.  SKIN: No acne or hirsutism.  NECK: Neck symmetric without masses or thyromegaly.  NODES: No inguinal, cervical, axillary or femoral lymph node enlargement.  CHEST: Good respiratory effort.   ABDOMEN: Soft. No tenderness or masses. No hepatosplenomegaly. No hernias.  BREASTS: Symmetrical, no skin changes or visible lesions. No palpable masses, nipple discharge bilaterally.  PELVIC: Normal external female genitalia without lesions. Normal hair distribution. Adequate perineal body, normal urethral meatus. Vagina moist and well rugated without lesions or discharge. Cervix pink, without lesions, discharge or tenderness. No significant cystocele or rectocele. Bimanual exam shows uterus is 10 weeks, regular, mobile and nontender. Adnexa without masses or tenderness.  EXTREMITIES: No edema.          ASSESSMENT and PLAN:    ICD-10-CM ICD-9-CM    1. Positive pregnancy test Z32.01 V72.42 HIV-1 and HIV-2 antibodies      RPR      Hepatitis B surface antigen      Type & Screen      Rubella antibody, IgG      Urine culture      CBC auto differential      C. trachomatis/N. gonorrhoeae by AMP DNA Cervix      POCT urine pregnancy      Liquid-based pap smear, screening      Comprehensive metabolic panel      Hemoglobin A1c      Lactate dehydrogenase      Protein / creatinine ratio, urine      US MFM Procedure ([facility])-Future   2. Pre-existing insulin-dependent diabetes mellitus during pregnancy, first trimester O24.311 648.03 Protein / creatinine ratio, urine    Z79.4 250.00 US MFM Procedure ([facility])-Future     V58.67    3.  History of  section complicating pregnancy O34.219 654.20 [facility] Procedure ([facility])-Future     Tighter control of DM  BASELINE LABS  Needs to come in with BS log and evaluate the numbers  Discussed risks of DM, elevated BS and fetal complications    Patient was counseled today on proper weight gain based on the [facility]'s recommendations based on her pre-pregnancy weight. Discussed foods to avoid in pregnancy (i.e. sushi, fish that are high in mercury, deli meat, and unpasteurized cheeses). Discussed prenatal vitamin options (i.e. stool softener, DHA). Contingency screen offered - patient desires.    F/U in two weeks  CHeck ON BS and further management of DM

## 2017-08-31 NOTE — TELEPHONE ENCOUNTER
----- Message from Juan C Castillo sent at 8/31/2017  3:25 PM CDT -----  Contact: pt  x_ 1st Request   _ 2nd Request   _ 3rd Request     Who: NIKOLE HENNING [[id number]]    Why: pt called stated she is at the [place] office for her 3:00 appointment to see Dr Miller and was just called back.  She is also scheduled for an U/S at the [facility] location at 4:00 and is concerned she will be late.    What Number to Call Back: [phone]    When to Expect a call back: (Before the end of the day)   -- if call after 3:00 call back will be tomorrow.

## 2017-09-01 LAB
C TRACH DNA SPEC QL NAA+PROBE: NOT DETECTED
N GONORRHOEA DNA SPEC QL NAA+PROBE: NOT DETECTED

## 2017-09-02 LAB — BACTERIA UR CULT: NO GROWTH

## 2017-09-04 ENCOUNTER — TELEPHONE (OUTPATIENT)
Dept: ENDOCRINOLOGY | Facility: HOSPITAL | Age: 37
End: 2017-09-04

## 2017-09-04 RX ORDER — INSULIN ASPART 100 [IU]/ML
INJECTION, SOLUTION INTRAVENOUS; SUBCUTANEOUS
Qty: 1 BOX | Refills: 5 | Status: SHIPPED | OUTPATIENT
Start: 2017-09-04 | End: 2017-10-09 | Stop reason: SDUPTHER

## 2017-09-04 NOTE — TELEPHONE ENCOUNTER
Patient called for refill for Novolog. She is currently 8 weeks pregnant and needs an urgent diabetes appt. Former patient of Dr. Kc.

## 2017-09-05 ENCOUNTER — TELEPHONE (OUTPATIENT)
Dept: ENDOCRINOLOGY | Facility: CLINIC | Age: 37
End: 2017-09-05

## 2017-09-05 NOTE — PROCEDURES
Procedures   Obstetrical ultrasound completed today.  See report in imaging section of [facility].

## 2017-09-06 ENCOUNTER — TELEPHONE (OUTPATIENT)
Dept: OBSTETRICS AND GYNECOLOGY | Facility: CLINIC | Age: 37
End: 2017-09-06

## 2017-09-06 RX ORDER — PEN NEEDLE, DIABETIC 30 GX3/16"
NEEDLE, DISPOSABLE MISCELLANEOUS
Qty: 200 EACH | Refills: 3 | Status: SHIPPED | OUTPATIENT
Start: 2017-09-06 | End: 2017-10-09 | Stop reason: SDUPTHER

## 2017-09-06 NOTE — TELEPHONE ENCOUNTER
----- Message from Kristen Howard sent at 9/5/2017  4:26 PM CDT -----  Contact: pt  x_ 1st Request  _ 2nd Request  _ 3rd Request    Who: pt    Why: is returning a call from staff    What Number to Call Back: [phone]    When to Expect a call back: (Before the end of the day)  -- if call after 3:00 call back will be tomorrow.

## 2017-09-06 NOTE — TELEPHONE ENCOUNTER
----- Message from Yana Cavazos sent at 9/5/2017  4:21 PM CDT -----  Contact: self  [phone]  Ivonne Reich   Pt needs an refill on medication  BD INSULIN PEN NEEDLE    Thanks,

## 2017-09-15 ENCOUNTER — TELEPHONE (OUTPATIENT)
Dept: OBSTETRICS AND GYNECOLOGY | Facility: CLINIC | Age: 37
End: 2017-09-15

## 2017-09-15 NOTE — TELEPHONE ENCOUNTER
----- Message from Kristie Chavez sent at 9/15/2017  3:47 PM CDT -----  Contact: NIKOLE HENNING [[id number]]  _x  1st Request  _  2nd Request  _  3rd Request        Who: NIKOLE HENNING [[id number]]    Why: patient would like a call back in regards to scheduling some lab work    What Number to Call Back: [phone]    When to Expect a call back: (Before the end of the day)   -- if call after 3:00 call back will be tomorrow.

## 2017-09-18 ENCOUNTER — LAB VISIT (OUTPATIENT)
Dept: LAB | Facility: HOSPITAL | Age: 37
End: 2017-09-18
Attending: OBSTETRICS & GYNECOLOGY
Payer: COMMERCIAL

## 2017-09-18 DIAGNOSIS — Z32.01 POSITIVE PREGNANCY TEST: ICD-10-CM

## 2017-09-18 LAB
ABO + RH BLD: NORMAL
ALBUMIN SERPL BCP-MCNC: 3.9 G/DL
ALP SERPL-CCNC: 54 U/L
ALT SERPL W/O P-5'-P-CCNC: 11 U/L
ANION GAP SERPL CALC-SCNC: 12 MMOL/L
AST SERPL-CCNC: 16 U/L
BASOPHILS # BLD AUTO: 0 K/UL
BASOPHILS NFR BLD: 0 %
BILIRUB SERPL-MCNC: 0.5 MG/DL
BLD GP AB SCN CELLS X3 SERPL QL: NORMAL
BUN SERPL-MCNC: 10 MG/DL
CALCIUM SERPL-MCNC: 10.3 MG/DL
CHLORIDE SERPL-SCNC: 99 MMOL/L
CO2 SERPL-SCNC: 22 MMOL/L
CREAT SERPL-MCNC: 0.8 MG/DL
DIFFERENTIAL METHOD: ABNORMAL
EOSINOPHIL # BLD AUTO: 0 K/UL
EOSINOPHIL NFR BLD: 0 %
ERYTHROCYTE [DISTWIDTH] IN BLOOD BY AUTOMATED COUNT: 12 %
EST. GFR  (AFRICAN AMERICAN): >60 ML/MIN/1.73 M^2
EST. GFR  (NON AFRICAN AMERICAN): >60 ML/MIN/1.73 M^2
GLUCOSE SERPL-MCNC: 362 MG/DL
HCT VFR BLD AUTO: 35.1 %
HGB BLD-MCNC: 12.3 G/DL
LDH SERPL L TO P-CCNC: 149 U/L
LYMPHOCYTES # BLD AUTO: 1.1 K/UL
LYMPHOCYTES NFR BLD: 13.9 %
MCH RBC QN AUTO: 31.1 PG
MCHC RBC AUTO-ENTMCNC: 35 G/DL
MCV RBC AUTO: 89 FL
MONOCYTES # BLD AUTO: 0.2 K/UL
MONOCYTES NFR BLD: 2.7 %
NEUTROPHILS # BLD AUTO: 6.7 K/UL
NEUTROPHILS NFR BLD: 83.2 %
PLATELET # BLD AUTO: 224 K/UL
PMV BLD AUTO: 10.8 FL
POTASSIUM SERPL-SCNC: 4 MMOL/L
PROT SERPL-MCNC: 8 G/DL
RBC # BLD AUTO: 3.95 M/UL
SODIUM SERPL-SCNC: 133 MMOL/L
WBC # BLD AUTO: 8.01 K/UL

## 2017-09-18 PROCEDURE — 87340 HEPATITIS B SURFACE AG IA: CPT

## 2017-09-18 PROCEDURE — 83615 LACTATE (LD) (LDH) ENZYME: CPT

## 2017-09-18 PROCEDURE — 36415 COLL VENOUS BLD VENIPUNCTURE: CPT | Mod: PO

## 2017-09-18 PROCEDURE — 86592 SYPHILIS TEST NON-TREP QUAL: CPT

## 2017-09-18 PROCEDURE — 86900 BLOOD TYPING SEROLOGIC ABO: CPT

## 2017-09-18 PROCEDURE — 83036 HEMOGLOBIN GLYCOSYLATED A1C: CPT

## 2017-09-18 PROCEDURE — 85025 COMPLETE CBC W/AUTO DIFF WBC: CPT

## 2017-09-18 PROCEDURE — 86703 HIV-1/HIV-2 1 RESULT ANTBDY: CPT

## 2017-09-18 PROCEDURE — 80053 COMPREHEN METABOLIC PANEL: CPT

## 2017-09-18 PROCEDURE — 86850 RBC ANTIBODY SCREEN: CPT

## 2017-09-18 PROCEDURE — 86762 RUBELLA ANTIBODY: CPT

## 2017-09-19 LAB
ESTIMATED AVG GLUCOSE: 171 MG/DL
HBA1C MFR BLD HPLC: 7.6 %
HBV SURFACE AG SERPL QL IA: NEGATIVE
HIV 1+2 AB+HIV1 P24 AG SERPL QL IA: NEGATIVE
RPR SER QL: NORMAL
RUBV IGG SER-ACNC: 11.3 IU/ML
RUBV IGG SER-IMP: REACTIVE

## 2017-09-20 ENCOUNTER — OFFICE VISIT (OUTPATIENT)
Dept: MATERNAL FETAL MEDICINE | Facility: CLINIC | Age: 37
End: 2017-09-20
Payer: COMMERCIAL

## 2017-09-20 ENCOUNTER — LAB VISIT (OUTPATIENT)
Dept: LAB | Facility: OTHER | Age: 37
End: 2017-09-20
Attending: OBSTETRICS & GYNECOLOGY
Payer: COMMERCIAL

## 2017-09-20 VITALS — WEIGHT: 167.31 LBS | BODY MASS INDEX: 27.85 KG/M2

## 2017-09-20 DIAGNOSIS — Z36.89 ENCOUNTER FOR FETAL ANATOMIC SURVEY: Primary | ICD-10-CM

## 2017-09-20 DIAGNOSIS — O34.219 HISTORY OF CESAREAN SECTION COMPLICATING PREGNANCY: ICD-10-CM

## 2017-09-20 DIAGNOSIS — Z32.01 POSITIVE PREGNANCY TEST: ICD-10-CM

## 2017-09-20 DIAGNOSIS — Z36.82 ENCOUNTER FOR (NT) NUCHAL TRANSLUCENCY SCAN: ICD-10-CM

## 2017-09-20 PROCEDURE — 99499 UNLISTED E&M SERVICE: CPT | Mod: S$GLB,,, | Performed by: OBSTETRICS & GYNECOLOGY

## 2017-09-20 PROCEDURE — 76813 OB US NUCHAL MEAS 1 GEST: CPT | Mod: S$GLB,,, | Performed by: OBSTETRICS & GYNECOLOGY

## 2017-09-20 PROCEDURE — 36415 COLL VENOUS BLD VENIPUNCTURE: CPT

## 2017-09-20 PROCEDURE — 81508 FTL CGEN ABNOR TWO PROTEINS: CPT

## 2017-09-20 NOTE — LETTER
September 20, 2017      Nidhi Miller MD  [address]  [address]  [place] [address]           [facility]  [address]  [place] [address]  Phone: [phone]          Patient: Jimena Hazel   MR Number: [id number]   YOB: 1980   Date of Visit: 9/20/2017       Dear Dr. Nidhi Miller:    Thank you for referring Jimena Hazel to me for evaluation. Attached you will find relevant portions of my assessment and plan of care.    If you have questions, please do not hesitate to call me. I look forward to following Jimena Hazel along with you.    Sincerely,    Tara Castillo MD    Enclosure  CC:  No Recipients    If you would like to receive this communication electronically, please contact [email or web address] or [phone] to request more information on [employer] Link access.    For providers and/or their staff who would like to refer a patient to [facility], please contact us through our one-stop-shop provider referral line, [facility], at [phone].    If you feel you have received this communication in error or would no longer like to receive these types of communications, please e-mail [email or web address]

## 2017-09-20 NOTE — PROGRESS NOTES
Indication  ========    First trimester screening.    Method  ======    Transabdominal ultrasound examination. View: Good view.    Pregnancy  =========    Alston pregnancy. Number of fetuses: 1.    Dating  ======    Cycle: regular cycle  Ultrasound examination on: 9/20/2017  GA by U/S based upon: CRL  GA by U/S 11 w + 3 d  ZEKE by U/S: 4/8/2018  Assigned: Dating performed on 08/31/2017, based on ultrasound (CRL)  Assigned GA 11 w + 0 d  Assigned ZEKE: 4/11/2018    General Evaluation  ==============    Cardiac activity: present.  Placenta: posterior.  Amniotic fluid: normal amount.    Fetal Biometry  ============    CRL 46.3 mm 11w 3d Hadlock  NT 1.0 mm   bpm    Fetal Anatomy  ===========    The following structures appear normal:  Cranium.    The following structures were visualized:  Arms. Legs.    Maternal Structures  ===============    Ovaries / Tubes / Adnexa  Rt ovary D1 1.5 cm  Rt ovary D3 1.7 cm  Rt ovary mean 1.6 cm  Rt ovary vol 3.2 cm³  Lt ovary D1 2.8 cm  Lt ovary D2 1.6 cm  Lt ovary D3 1.8 cm  Lt ovary mean 2.1 cm  Lt ovary vol 4.4 cm³    Impression  =========    Alston live intrauterine pregnancy.  Biometry agrees with the clinical dating.  NT measures 1.0 mm.  Limited anatomy appears normal.  Ovaries appeared normal.    Recommendation  ==============    First portion of sequential screening completed today. Results to be sent to primary pregnancy care provider. Recommend to complete second  blood draw beyond 15 weeks EGA of pregnancy. Ultrasound for fetal anatomical survey scheduled by [facility] today for 19-20 weeks EGA.

## 2017-09-21 ENCOUNTER — TELEPHONE (OUTPATIENT)
Dept: OBSTETRICS AND GYNECOLOGY | Facility: CLINIC | Age: 37
End: 2017-09-21

## 2017-09-21 DIAGNOSIS — Z3A.11 11 WEEKS GESTATION OF PREGNANCY: ICD-10-CM

## 2017-09-21 NOTE — TELEPHONE ENCOUNTER
Patient has been running way too daljit with her blood sugars. Please refer to MFM for tighter control.  MARCELLE consulted

## 2017-09-25 LAB
# FETUSES US: NORMAL
AGE AT DELIVERY: 35
B-HCG MOM SERPL: NORMAL
B-HCG SERPL-ACNC: 57.1 IU/ML
FET CRL US.MEAS: 46.3 MM
FET NASAL BONE LENGTH US.MEAS: NORMAL MM
FET NUCHAL FOLD MOM THICKNESS US.MEAS: NORMAL
FET NUCHAL FOLD THICKNESS US.MEAS: 1 MM
FET TS 21 RISK FROM MAT AGE: NORMAL
GA (DAYS): 3 D
GA (WEEKS): 11 WK
IDDM PATIENT QL: NORMAL
INTEGRATED SCN PATIENT-IMP: NEGATIVE
PAPP-A MOM SERPL: NORMAL
PAPP-A SERPL-MCNC: 820.8 NG/ML
SEQUENTIAL SCREEN I INTERP.: NORMAL
SMOKING STATUS FTND: NO
TS 18 RISK FETUS: NORMAL
TS 21 RISK FETUS: NORMAL
US DATE: NORMAL

## 2017-09-29 ENCOUNTER — TELEPHONE (OUTPATIENT)
Dept: ENDOCRINOLOGY | Facility: CLINIC | Age: 37
End: 2017-09-29

## 2017-09-29 NOTE — TELEPHONE ENCOUNTER
----- Message from REJI Hallman, RENÉEP sent at 9/29/2017  3:01 PM CDT -----  Does she need novolog?  I refilled the levemir rx that was sent just now. Can you send that too if that is the case?  Nik

## 2017-09-29 NOTE — TELEPHONE ENCOUNTER
----- Message from Radha Daniels sent at 9/29/2017  1:04 PM CDT -----  Contact: Jimena       [phone]   Pt.says she needs to get a refill for her insulin.   Novalog flex pen.   Send to [facility] on the [address].      Will run out before her appt.w / you.      Pls send this today if possible.

## 2017-10-09 ENCOUNTER — OFFICE VISIT (OUTPATIENT)
Dept: ENDOCRINOLOGY | Facility: CLINIC | Age: 37
End: 2017-10-09
Payer: COMMERCIAL

## 2017-10-09 ENCOUNTER — INITIAL CONSULT (OUTPATIENT)
Dept: MATERNAL FETAL MEDICINE | Facility: CLINIC | Age: 37
End: 2017-10-09
Payer: COMMERCIAL

## 2017-10-09 VITALS
SYSTOLIC BLOOD PRESSURE: 98 MMHG | HEART RATE: 104 BPM | BODY MASS INDEX: 29.12 KG/M2 | DIASTOLIC BLOOD PRESSURE: 68 MMHG | WEIGHT: 174.81 LBS | HEIGHT: 65 IN | RESPIRATION RATE: 18 BRPM

## 2017-10-09 VITALS — BODY MASS INDEX: 28.87 KG/M2 | WEIGHT: 173.5 LBS | DIASTOLIC BLOOD PRESSURE: 64 MMHG | SYSTOLIC BLOOD PRESSURE: 102 MMHG

## 2017-10-09 DIAGNOSIS — Z3A.14 14 WEEKS GESTATION OF PREGNANCY: ICD-10-CM

## 2017-10-09 DIAGNOSIS — E10.65 UNCONTROLLED TYPE 1 DIABETES MELLITUS WITH HYPERGLYCEMIA: Primary | ICD-10-CM

## 2017-10-09 DIAGNOSIS — E10.649: ICD-10-CM

## 2017-10-09 DIAGNOSIS — Z71.9 HEALTH EDUCATION/COUNSELING: ICD-10-CM

## 2017-10-09 DIAGNOSIS — Z36.9 ENCOUNTER FOR FETAL ULTRASOUND: Primary | ICD-10-CM

## 2017-10-09 PROCEDURE — 99242 OFF/OP CONSLTJ NEW/EST SF 20: CPT | Mod: 25,S$GLB,, | Performed by: OBSTETRICS & GYNECOLOGY

## 2017-10-09 PROCEDURE — 76815 OB US LIMITED FETUS(S): CPT | Mod: S$GLB,,, | Performed by: OBSTETRICS & GYNECOLOGY

## 2017-10-09 PROCEDURE — 99999 PR PBB SHADOW E&M-EST. PATIENT-LVL III: CPT | Mod: PBBFAC,,, | Performed by: NURSE PRACTITIONER

## 2017-10-09 PROCEDURE — 99214 OFFICE O/P EST MOD 30 MIN: CPT | Mod: S$GLB,,, | Performed by: NURSE PRACTITIONER

## 2017-10-09 PROCEDURE — 99999 PR PBB SHADOW E&M-EST. PATIENT-LVL II: CPT | Mod: PBBFAC,,, | Performed by: OBSTETRICS & GYNECOLOGY

## 2017-10-09 RX ORDER — INSULIN ASPART 100 [IU]/ML
INJECTION, SOLUTION INTRAVENOUS; SUBCUTANEOUS
Qty: 2 BOX | Refills: 6 | Status: ON HOLD | OUTPATIENT
Start: 2017-10-09 | End: 2017-10-18

## 2017-10-09 RX ORDER — PEN NEEDLE, DIABETIC 30 GX3/16"
NEEDLE, DISPOSABLE MISCELLANEOUS
Qty: 200 EACH | Refills: 3 | Status: SHIPPED | OUTPATIENT
Start: 2017-10-09

## 2017-10-09 NOTE — PATIENT INSTRUCTIONS
Hypoglycemia (Low Blood Sugar)     Fast-acting sugar includes a cup of nonfat milk.     Too little sugar (glucose) in your blood is called hypoglycemia or low blood sugar. Low blood sugar usually means anything lower than 70 mg/dL. Talk with your healthcare provider about your target range and what level is too low for you. Diabetes itself doesnt cause low blood sugar. But some of the treatments for diabetes, such as pills or insulin, may raise your risk for it. Low blood sugar may cause you to pass out or have a seizure. So always treat low blood sugar right away, but don't overeat.  Special note: Always carry a source of fast-acting sugar and a snack in case of hypoglycemia.   What you may notice  If you have low blood sugar, you may have one or more of these symptoms:  · Shakiness or dizziness  · Cold, clammy skin or sweating  · Feelings of hunger  · Headache  · Nervousness  · A hard, fast heartbeat  · Weakness  · Confusion or irritability  · Blurred vision  · Having nightmares or waking up confused or sweating  · Numbness or tingling in the lips or tongue  What you should do  Here are tips to follow if you have hypoglycemia:   · First check your blood sugar. If it is too low (out of your target range), eat or drink 15 to 20 grams of fast-acting sugar. This may be 3 to 4 glucose tablets, 4 ounces (half a cup) of fruit juice or regular (nondiet) soda, 8 ounces (1 cup) of fat-free milk, or 1 tablespoon of honey. Dont take more than this, or your blood sugar may go too high.  · Wait 15 minutes. Then recheck your blood sugar if you can.  · If your blood sugar is still too low, repeat the steps above and check your blood sugar again. If your blood sugar still has not returned to your target range, contact your healthcare provider or seek emergency care.  · Once your blood sugar returns to target range, eat a snack or meal.  Preventing low blood sugar  Things you can do include the following:   · If your condition  needs a strict treatment plan, eat your meals and snacks at the same times each day. Dont skip meals!  · If your treatment plan lets you change when you eat and what you eat, learn how to change the time and dose of your rapid-acting insulin to match this.   · Ask your healthcare provider if it is safe for you to drink alcohol. Never drink on an empty stomach.  · Take your medicine at the prescribed times.  · Always carry a source of fast-acting sugar and a snack when youre away from home.  Other things to do  Additional tips include the following:  · Carry a medical ID card, a compact USB drive, or wear a medical alert bracelet or necklace. It should say that you have diabetes. It should also say what to do if you pass out or have a seizure.  · Make sure your family, friends, and coworkers know the signs of low blood sugar. Tell them what to do if your blood sugar falls very low and you cant treat yourself.  · Keep a glucagon emergency kit handy. Be sure your family, friends, and coworkers know how and when to use it. Check it regularly and replace the glucagon before it expires.  · Talk with your health care team about other things you can do to prevent low blood sugar.     If you have unexplained hypoglycemia or hypoglycemia several times, call your healthcare provider.   Date Last Reviewed: 5/1/2016  © 5588-1786 [employer]. [address] [place] [address], [place] [address]. All rights reserved. This information is not intended as a substitute for professional medical care. Always follow your healthcare professional's instructions.          Snacks can be an important part of a balanced, healthy meal plan. They allow you to eat more frequently, feeling full and satisfied throughout the day. Also, they allow you to spread carbohydrates evenly, which may stabilize blood sugars.  Plus, snacks are enjoyable!     The amount of carbohydrate needed at snacks varies. Generally, about 15-30 grams of carbohydrate  per snack is recommended.  Below you will find some tasty treats.       0-5 gm carb   Crystal Light   Vitamin Water Zero   Herbal tea, unsweetened   2 tsp peanut butter on celery   1./2 cup sugar-free jell-o   1 sugar-free popsicle   ¼ cup blueberries   8oz Blue Kristina unsweetened almond milk   5 baby carrots & celery sticks, cucumbers, bell peppers dipped in ¼ cup salsa, 2Tbsp light ranch dressing or 2Tbsp plain Greek yogurt   10 Goldfish crackers   ½ oz low-fat cheese or string cheese   1 closed handful of nuts, unsalted   1 Tbsp of sunflower seeds, unsalted   1 cup Smart Pop popcorn   1 whole grain brown rice cake        15 gm carb   1 small piece of fruit or ½ banana or 1/2 cup lite canned fruit   3 manjit cracker squares   3 cups Smart Pop popcorn, top spray butter, Lorenzo lite salt or cinnamon and Truvia   5 Vanilla Wafers   ½ cup low fat, no added sugar ice cream or frozen yogurt (Blue bell, Blue Bunny, Weight Watchers, Skinny Cow)   ½ turkey, ham, or chicken sandwich   ½ c fruit with ½ c Cottage cheese   4-6 unsalted wheat crackers with 1 oz low fat cheese or 1 tbsp peanut butter    30-45 goldfish crackers (depending on flavor)    7-8 Restorationism mini brown rice cakes (caramel, apple cinnamon, chocolate)    12 Restorationism mini brown rice cakes (cheddar, bbq, ranch)    1/3 cup hummus dip with raw veg   1/2 whole wheat rolan, 1Tbsp hummus   Mini Pizza (1/2 whole wheat English muffin, low-fat  cheese, tomato sauce)   100 calorie snack pack (Oreo, Chips Ahoy, Ritz Mix, Baked Cheetos)   4-6 oz. light or Greek Style yogurt (Chobani, Yoplait, Okios, [place]field)   ½ cup sugar-free pudding     6 in. wheat tortilla or rolan oven toasted chips (topped with spray butter flavoring, cinnamon, Truvia OR spray butter, garlic powder, chili powder)    18 BBQ Popchips (available at [employer], [employer], Fresh Market)

## 2017-10-09 NOTE — PROGRESS NOTES
CC: Jimena Hazel arrives today for management of Type 1 DM and review of chronic medical conditions.      HPI: Mrs. Jimena Hazel is a 36 y.o. Black or  female who was diagnosed with Type 1  DM in ~ years ago s/s polyuria, N/V. BG readings in the 900's. DKA at the time of diagnosis.   + POLO in 2016.     She saw Dr. Kc back in 2016. Has not seen endocrine since. She was hospitalized this past 2017 for DKA. She is being seen by me for the first time today. She present with significant other Ladarius. She is 14 weeks pregnant. They are going to be surprised with the sex of the baby.   . She did not have diabetes during other 2 pregnancies. No complications with prior pregnancies. Hx of C-sections.   EDC is 20     Following with Dr. Nagy in maternal and fetal medicine.     DIABETES COMPLICATIONS: none     HOSPITALIZATIONS FOR DIABETES OR RELATED COMPLICATIONS:  Yes - at the time of diagnosis and 3 other times for DKA- shortly after the diagnosis of T1DM. Last episode of DKA was in 2017.     CURRENT A1C:    Hemoglobin A1C   Date Value Ref Range Status   2017 7.6 (H) 4.0 - 5.6 % Final     Comment:     According to ADA guidelines, hemoglobin A1c <7.0% represents  optimal control in non-pregnant diabetic patients. Different  metrics may apply to specific patient populations.   Standards of Medical Care in Diabetes-2016.  For the purpose of screening for the presence of diabetes:  <5.7%     Consistent with the absence of diabetes  5.7-6.4%  Consistent with increasing risk for diabetes   (prediabetes)  >or=6.5%  Consistent with diabetes  Currently, no consensus exists for use of hemoglobin A1c  for diagnosis of diabetes for children.  This Hemoglobin A1c assay has significant interference with fetal   hemoglobin   (HbF). The results are invalid for patients with abnormal amounts of   HbF,   including those with known Hereditary Persistence   of Fetal  Hemoglobin. Heterozygous hemoglobin variants (HbAS, HbAC,   HbAD, HbAE, HbA2) do not significantly interfere with this assay;   however, presence of multiple variants in a sample may impact the %   interference.     01/09/2017 11.3 (H) 4.5 - 6.2 % Final     Comment:     According to ADA guidelines, hemoglobin A1C <7.0% represents  optimal control in non-pregnant diabetic patients.  Different  metrics may apply to specific populations.   Standards of Medical Care in Diabetes - 2016.  For the purpose of screening for the presence of diabetes:  <5.7%     Consistent with the absence of diabetes  5.7-6.4%  Consistent with increasing risk for diabetes   (prediabetes)  >or=6.5%  Consistent with diabetes  Currently no consensus exists for use of hemoglobin A1C  for diagnosis of diabetes for children.     01/09/2016 11.6 (H) 4.5 - 6.2 % Final       GOAL A1C: < 6.5% while pregnant, < 6 in the 2nd and 3rd trimester.    DM MEDICATIONS USED IN THE PAST: Levemir and Novolog.     CURRENT DIABETIC MEDS: Levemir 20 units nightly (although documented as 16 units BID) and  Novolog 10 units with meals + correction scale. She is currently administering correction scale insulin 2 hours after she eats and gives meal time insulin.   Uses Vials or Pens: pens       STANDARDS of CARE:  Statin:  No  ACEI or ARB:  No  ASA:  No  Last eye exam : ~ April 2017  Last Podiatry Exam: None   Microalbumin/Creatinine ratio:  No results found for: MICALBCREAT     BG readings are checked 4-5x/day. She present today with her meter for review.   Readings range from .     7 day average is 193  14 day average 191  30 day average is 203.     Hypoglycemia: Yes- at least 1-2 times per day. Mostly at 1AM-2AM in the morning.  Denies hypoglycemia during the day.  S/s when BG readings < 80 - + jitteriness. Treats BG readings < 60. Attest to sometimes over treating.     Skipped/missed glycemic medications: No- denies     Prandial injections taken with meals:  "Yes    Physical Activity: No    Skipping meals and/or snacking: eating 2 meals per day. Usually skipping breakfast. Snacking on chips.   Drinks mostly water or diet cokes.       OCCUPATION: Teacher.     MEDICATIONS, ALLERGIES, LABS, VS's, MEDICAL, SURGICAL, SOCIAL, AND FAMILY HISTORY reviewed and updated in the appropriate sections during this encounter    ROS  Constitutional: Negative for weight change  Endocrine:  Denies polyuria, polydipsia, nocturia.  HENT: Denies neck swelling, lumps, hoarseness or trouble swallowing. Denies any personal or family history of thyroid cancer.    Eyes: Negative for visual disturbance.   Respiratory: Negative for cough or shortness of breath.  Cardiovascular: Negative for chest pain or claudication.   Gastrointestinal: Negative for nausea, diarrhea, vomiting, bloating.  No history of pancreatitis or gastroparesis.  Genitourinary: Negative for urgency, frequency, frequent urinary tract infections.  Skin: Denies prolonged wound healing.  Neurological: Negative for syncope, numbness/burning of extremities.  Psychiatric/Behavioral: Negative for depression or anxiety      Vital Signs  BP 98/68   Pulse 104   Resp 18   Ht 5' 5" (1.651 m)   Wt 79.3 kg (174 lb 12.8 oz)   LMP 06/25/2017 (Approximate)   BMI 29.09 kg/m²         Chemistry        Component Value Date/Time     (L) 09/18/2017 1322    K 4.0 09/18/2017 1322    CL 99 09/18/2017 1322    CO2 22 (L) 09/18/2017 1322    BUN 10 09/18/2017 1322    CREATININE 0.8 09/18/2017 1322     (H) 09/18/2017 1322        Component Value Date/Time    CALCIUM 10.3 09/18/2017 1322    ALKPHOS 54 (L) 09/18/2017 1322    AST 16 09/18/2017 1322    ALT 11 09/18/2017 1322    BILITOT 0.5 09/18/2017 1322    ESTGFRAFRICA >60.0 09/18/2017 1322    EGFRNONAA >60.0 09/18/2017 1322          No results found for: CHOL  No results found for: HDL  No results found for: LDLCALC  No results found for: TRIG  No results found for: CHOLHDL    No results found " for: TSH    No results found for: MICALBCREAT    No results found for: [id number]      PHYSICAL EXAMINATION  Constitutional: Well developed, well nourished, NAD. Pregnant.   Psych: AAOx3, appropriate mood and affect, pleasant expression, conversant, appears relaxed, well groomed.   Eyes: Conjunctiva and corneas clear.  Neck: Supple, trachea midline, FROM, no thyromegaly or nodules, carotid pulses strong, no bruits.  Respiratory: Resp even and unlabored, CTA bilateral.  Cardiac: RRR, S1, S2 heard, no murmurs; radial pulses +2 bilaterally.   Abdomen: Soft, non-tender; +BSs x  4. Rounded.   Vascular: Same temperature peripherally to centrally, DP pulses +2 bilaterally, no edema.   Neuro: Gait steady.   Skin: Normal skin turgor. Skin warm and dry. No acanthosis nigracans observed. Injection sites with no complications to abdomen.   Feet: No pressure areas, blisters, ulcers, calluses. Wearing Sandals. Nails in good condition.     Diabetes Foot Exam:   Positive vibratory response to 128 Hz tuning fork bilaterally.   Protective Sensation (w/ 10 gram monofilament):  Right: Intact  Left: Intact    Visual Inspection:  Normal -  Bilateral, Nails Intact - without Evidence of Foot Deformity- Bilateral and Dry Skin -  Bilateral    Pedal Pulses:   Right: Present  Left: Present    Posterior tibialis:   Right:Present  Left: Present      Assessment/Plan    1. Uncontrolled type 1 diabetes mellitus with hyperglycemia   A1c is trending down, but above goal for pregnancy of < 6.5%.     Discussed the importance of blood glucose control during pregnancy in order to prevent complications such as  labor, large for gestational age, preeclampsia.    Discussed insulin resistance with pregnancy.     ADA and ACOG glucose targets are:  ?Fasting blood glucose concentration ?95 mg/dL (5.3 mmol/L)  ?One-hour postprandial blood glucose concentration ?140 mg/dL (7.8 mmol/L)  ?Two-hour postprandial glucose concentration ?120 mg/dL (6.7  mmol/L)  Reviewed A1c and BG goals.     Adjust Levemir to 18 units nightly to prevent overnight hypoglycemia- likely leading to rebound hyperglycemia in the AM.   Discussed hypoglycemia treatment- to prevent over treating and rebound hyperglycemia. Hand out provided.     Increase Novolog to 14 units AC + moderate correction scale.  Stop giving Novolog correction scale with 2 hours PP BG check. Discussed insulin stacking.     BG monitoring AC/HS and 2 hours post prandial. Send logs to me in 1 week via the portal for review    She is interested in the Dexcom- printed material provided.     Discussed low CHO snack options. Printed material provided.     DM education- MNT/ pregnant/T1DM/Sensors.    Notify clinic of any hypoglycemia episodes with BG readings < 60, if BG readings consistently run <70 or > 200, or for any BG concerns.          2. Uncontrolled type 1 diabetes mellitus with hypoglycemia, with long-term current use of insulin   See above. Insulin doses adjusted.          3. 14 weeks gestation of pregnancy -   Pregnancy increases insulin resistance. Discussed this at length.   Optimize BG readings. Continue to follow with OBGYN.             Health counseling- spent more than 50% of the visit and more than 30 minutes on health counseling.     Current DM Educational Needs:  1. Educational Need Identified: diet for T1DM and pregnant.   2. Type of therapy and dose: MDI   3. Last DM Education: None     FOLLOW UP  Return in about 6 weeks (around 11/20/2017).     Orders Placed This Encounter   Procedures    Fructosamine     Standing Status:   Future     Standing Expiration Date:   12/8/2018    C-peptide     Standing Status:   Future     Standing Expiration Date:   12/8/2018    Comprehensive metabolic panel     Standing Status:   Future     Standing Expiration Date:   12/8/2018    Hemoglobin A1c     Standing Status:   Future     Standing Expiration Date:   12/8/2018    Ambulatory Referral to Diabetes Education      Referral Priority:   Routine     Referral Type:   Consultation     Referral Reason:   Specialty Services Required     Requested Specialty:   Diabetes     Number of Visits Requested:   1

## 2017-10-09 NOTE — PROGRESS NOTES
Indication  ========    FHT.    Method  ======    Transabdominal ultrasound examination, [place] E10. View: Good view.    Pregnancy  =========    [place] pregnancy. Number of fetuses: 1.    Dating  ======    Cycle: regular cycle  Assigned: Dating performed on 2017, based on ultrasound ([facility])  Assigned GA 13 w + 5 d  Assigned ZEKE: 2018    General Evaluation  ==============    Cardiac activity: present.    Fetal Biometry  ============     bpm    Consultation  ==========    Consultation for Diabetes in pregnancy  Today the patient was counseled on the risks of diabetes in pregnancy. I reviewed the physiologic effects of pregnancy on diabetes and I  stressed with the patient the need for meticulous glucose control. The patient and her partner were counseled on the increased risks of fetal  structural anomalies, macrosomia, prematurity,  delivery, hypertensive disorders of pregnancy,   hyperbilirubinemia/hypoglycemia or respiratory problems, and sudden stillbirth in those patients with poor glucose control. Shoulder dystocia is  more common in women with diabetes, however, the patient will be delivered via repeat . I also discussed with the patient the need for  increased fetal surveillance with monthly fetal growth assessments and third trimester fetal testing. I also reviewed the possibility of need for  early delivery in those with poor glucose control, preeclampsia or evidence of fetal growth abnormalities. Her recent hemoglobin A1C on   was 7.6 mg/dl. No log was available for review, however, I reviewed the average blood sugar in her meter from the past 30 days which was 188.  Her current insulin regimen is levemir 16 units qhs, novolog 10 units with breakfast, lunch, and dinner. Her last episode of DKA was 2017.    Recommendations from our MFM group at [facility]:  -Continue current insulin regimen, has appointment with endocrine today  -Log provided, have  asked her to obtain 8 blood sugars/day for the next week: 0200, fasting, pre and post breakfast/lunch/dinner-will review in 1  week  -Goal blood sugars reviewed: fasting <95 mg/dL and 2 hour postprandial levels <120 mg/dL.  -Recommend a fetal echocardiogram around 22-24 weeks gestation to rule out congenital heart disease.  -Targeted fetal ultrasound at 19-20 weeks gestation  -Start a low dose aspirin for preeclampsia risk reduction  -Periodic fetal growth assessments should be performed every 4 weeks throughout the third trimester to assess for fetal growth abnormalities.    -Twice weekly non-stress tests with weekly MVP should be instituted beginning at 32 weeks EGA  -Secondary to the high incidence of preeclampsia in patients with pregestational diabetes we recommend a baseline assessment of renal  function. This can be accomplished by a serum creatinine and a 24 hour urine collection or a urine protein to creatinine (P/C) ratio.  -In regards to timing of delivery our group recommends:  -Well controlled pre-gestational diabetes: 39 weeks  -Poorly controlled pre-gestational diabetes: 38 weeks  -If fetal growth restriction, HTN, or other complications develop, delivery may be indicated earlier    A speculum exam was performed today given complaints that sounded consistent with round ligament pain-normal exam. FHTs positive on  limited US.    The approximate physician face to face time was 30 minutes. The majority of time (greater than 50%) was spent on counseling of the patient or  coordination of care.    Impression  =========    Limited ultrasound demonstrates normal FHR for gestational age.    Recommendation  ==============    Recommend repeat [facility] visit in 1 week to review blood sugars.  Recommend starting a low dose aspirin  Recommend targeted ultrasound at 19-20 weeks for fetal anatomy survey  Recommend fetal echocardiogram at 22-24 weeks for pregestational diabetes  Recommend obtaining serum creatinine and 24  hour urine protein collection (or spot protein/creatinine ratio).

## 2017-10-09 NOTE — LETTER
October 9, 2017      Nidhi Miller MD  [address]  [address]  [place] [facility] [place] [address]           [facility]  [address]  [place] [facility] [place] [address]  Phone: [phone]          Patient: Jimena Hazel   MR Number: [id number]   YOB: 1980   Date of Visit: 10/9/2017       Dear Dr. Nidhi Miller:    Thank you for referring Jimena Hazel to me for evaluation. Attached you will find relevant portions of my assessment and plan of care.    If you have questions, please do not hesitate to call me. I look forward to following Jimena Hazel along with you.    Sincerely,    Shelby Nagy MD    Enclosure  CC:  No Recipients    If you would like to receive this communication electronically, please contact [email or web address] or [phone] to request more information on [employer] Link access.    For providers and/or their staff who would like to refer a patient to [facility], please contact us through our one-stop-shop provider referral line, [facility], at [phone].    If you feel you have received this communication in error or would no longer like to receive these types of communications, please e-mail [email or web address]

## 2017-10-12 ENCOUNTER — HOSPITAL ENCOUNTER (EMERGENCY)
Facility: OTHER | Age: 37
Discharge: HOME | End: 2017-10-12
Attending: EMERGENCY MEDICINE
Payer: COMMERCIAL

## 2017-10-12 VITALS
HEART RATE: 82 BPM | DIASTOLIC BLOOD PRESSURE: 59 MMHG | OXYGEN SATURATION: 100 % | RESPIRATION RATE: 18 BRPM | TEMPERATURE: 98 F | WEIGHT: 172 LBS | SYSTOLIC BLOOD PRESSURE: 101 MMHG | HEIGHT: 65 IN | BODY MASS INDEX: 28.66 KG/M2

## 2017-10-12 DIAGNOSIS — R51.9 ACUTE NONINTRACTABLE HEADACHE, UNSPECIFIED HEADACHE TYPE: Primary | ICD-10-CM

## 2017-10-12 LAB
BILIRUB UR QL STRIP: NEGATIVE
CLARITY UR: CLEAR
COLOR UR: YELLOW
GLUCOSE UR QL STRIP: ABNORMAL
HGB UR QL STRIP: ABNORMAL
KETONES UR QL STRIP: NEGATIVE
LEUKOCYTE ESTERASE UR QL STRIP: NEGATIVE
NITRITE UR QL STRIP: NEGATIVE
PH UR STRIP: 6 [PH] (ref 5–8)
PROT UR QL STRIP: NEGATIVE
SP GR UR STRIP: 1.02 (ref 1–1.03)
URN SPEC COLLECT METH UR: ABNORMAL
UROBILINOGEN UR STRIP-ACNC: ABNORMAL EU/DL

## 2017-10-12 PROCEDURE — 81003 URINALYSIS AUTO W/O SCOPE: CPT

## 2017-10-12 PROCEDURE — 96374 THER/PROPH/DIAG INJ IV PUSH: CPT

## 2017-10-12 PROCEDURE — 96375 TX/PRO/DX INJ NEW DRUG ADDON: CPT

## 2017-10-12 PROCEDURE — 99284 EMERGENCY DEPT VISIT MOD MDM: CPT | Mod: 25

## 2017-10-12 PROCEDURE — 25000003 PHARM REV CODE 250: Performed by: PHYSICIAN ASSISTANT

## 2017-10-12 PROCEDURE — 96361 HYDRATE IV INFUSION ADD-ON: CPT

## 2017-10-12 PROCEDURE — 63600175 PHARM REV CODE 636 W HCPCS: Performed by: PHYSICIAN ASSISTANT

## 2017-10-12 RX ORDER — ACETAMINOPHEN 500 MG
500 TABLET ORAL
Status: COMPLETED | OUTPATIENT
Start: 2017-10-12 | End: 2017-10-12

## 2017-10-12 RX ORDER — METOCLOPRAMIDE HYDROCHLORIDE 5 MG/ML
10 INJECTION INTRAMUSCULAR; INTRAVENOUS
Status: COMPLETED | OUTPATIENT
Start: 2017-10-12 | End: 2017-10-12

## 2017-10-12 RX ORDER — DIPHENHYDRAMINE HYDROCHLORIDE 50 MG/ML
12.5 INJECTION INTRAMUSCULAR; INTRAVENOUS
Status: COMPLETED | OUTPATIENT
Start: 2017-10-12 | End: 2017-10-12

## 2017-10-12 RX ORDER — SODIUM CHLORIDE 9 MG/ML
1000 INJECTION, SOLUTION INTRAVENOUS
Status: COMPLETED | OUTPATIENT
Start: 2017-10-12 | End: 2017-10-12

## 2017-10-12 RX ADMIN — SODIUM CHLORIDE 1000 ML: 0.9 INJECTION, SOLUTION INTRAVENOUS at 12:10

## 2017-10-12 RX ADMIN — DIPHENHYDRAMINE HYDROCHLORIDE 12.5 MG: 50 INJECTION, SOLUTION INTRAMUSCULAR; INTRAVENOUS at 12:10

## 2017-10-12 RX ADMIN — METOCLOPRAMIDE 10 MG: 5 INJECTION, SOLUTION INTRAMUSCULAR; INTRAVENOUS at 12:10

## 2017-10-12 RX ADMIN — ACETAMINOPHEN 500 MG: 500 TABLET ORAL at 12:10

## 2017-10-12 NOTE — ED PROVIDER NOTES
"Encounter Date: 10/12/2017       History     Chief Complaint   Patient presents with    Headache     "migraine" headache starting at 0600 with photophobia; pt currently 14 weeks pregnant     Patient is a 36-year-old female,  at approximately 14 weeks' gestational age, presenting to the emergency department with complaints of headache.  The patient reports that she has a history of migraines.  She reports that this morning she woke up with a generalized headache.  She reports associated photophobia and nausea, denies fever, chills, neck pain or stiffness.  She denies vomiting.  She is not taken any medication for symptoms thus far.  She denies vaginal bleeding or vaginal discharge.  She has established prenatal care with Dr. Miller as well as MARCELLE due to her DM1.       The history is provided by the patient and the spouse.     Review of patient's allergies indicates:  No Known Allergies  Past Medical History:   Diagnosis Date    Diabetes mellitus     Diabetes mellitus type I     Irregular heartbeat      Past Surgical History:   Procedure Laterality Date     SECTION, LOW TRANSVERSE       Family History   Problem Relation Age of Onset    Diabetes Mother     Hypertension Mother     Diabetes Father     No Known Problems Sister     No Known Problems Brother      Social History   Substance Use Topics    Smoking status: Never Smoker    Smokeless tobacco: Never Used    Alcohol use No     Review of Systems   Constitutional: Negative for activity change, appetite change, chills, fatigue and fever.   HENT: Negative for congestion, rhinorrhea and sore throat.    Eyes: Positive for photophobia. Negative for visual disturbance.   Respiratory: Negative for cough, shortness of breath and wheezing.    Cardiovascular: Negative for chest pain.   Gastrointestinal: Positive for nausea. Negative for abdominal pain, diarrhea and vomiting.   Genitourinary: Negative for dysuria, hematuria and urgency. "   Musculoskeletal: Negative for back pain, myalgias and neck pain.   Skin: Negative for color change and wound.   Neurological: Positive for headaches. Negative for weakness.   Psychiatric/Behavioral: Negative for agitation and confusion.       Physical Exam     Initial Vitals [10/12/17 1044]   BP Pulse Resp Temp SpO2   109/70 92 16 98 °F (36.7 °C) 100 %      MAP       83         Physical Exam    Nursing note and vitals reviewed.  Constitutional: Vital signs are normal. She appears well-developed and well-nourished. She is not diaphoretic. She is cooperative.  Non-toxic appearance. She does not have a sickly appearance. She does not appear ill. No distress.   Well appearing, -American female accompanied by her  in the emergency department.  She speaking clear and full sentences.  She is in no acute distress.   HENT:   Head: Normocephalic and atraumatic.   Right Ear: External ear normal.   Left Ear: External ear normal.   Nose: Nose normal.   Mouth/Throat: Oropharynx is clear and moist.   Eyes: Conjunctivae and EOM are normal.   Neck: Normal range of motion. Neck supple.   Cardiovascular: Normal rate, regular rhythm and normal heart sounds.   Pulmonary/Chest: Breath sounds normal. No respiratory distress. She has no wheezes.   Abdominal: Soft. Bowel sounds are normal. She exhibits no distension. There is no tenderness. There is no rebound and no guarding.   Musculoskeletal: Normal range of motion.   Neurological: She is alert and oriented to person, place, and time. She has normal strength. No cranial nerve deficit or sensory deficit. GCS eye subscore is 4. GCS verbal subscore is 5. GCS motor subscore is 6.   AAOx4. CN II-XII were intact.    Skin: Skin is warm.   Psychiatric: She has a normal mood and affect. Her behavior is normal. Judgment and thought content normal.         ED Course   Procedures  Labs Reviewed   URINALYSIS - Abnormal; Notable for the following:        Result Value    Glucose, UA 2+  (*)     Occult Blood UA Trace (*)     Urobilinogen, UA 4.0-6.0 (*)     All other components within normal limits             Medical Decision Making:   Initial Assessment:   Urgent evaluation of a 36-year-old female,  at 14 weeks' gestational age, presenting to the emergency department with complaints of headache and pregnancy.  Patient is afebrile, nontoxic appearing, hemodynamically stable. There are no focal weakness, numbness, meningismus, or other focal neurologic deficit. There is no history of trauma, fevers, elevated blood pressure to suggest meningitis, subarachnoid hemorrhage, TIA, stroke, mass, or hypertensive urgency. I do not feel a CT of the brain or blood work are necessary at this time.  Will plan for IV medication, fluids and reassessment.  ED Management:  Fetal heart tones are assessed at 144 bpm.  UA shows 2+ glucose with no evidence of acute UTI.  On reassessment after receiving fluids and medication, patient reports much relief of her symptoms.  At this time, do not feel that further testing or imaging is warranted.  Patient is stable for discharge home. The patient was instructed to follow up with a primary care provider in 2 days or to return to the emergency department for worsening symptoms. The treatment plan was discussed with the patient who demonstrated understanding and comfort with plan. The patient's history, physical exam, and plan of care was discussed with and agreed upon with my supervising physician.    Other:   I have discussed this case with another health care provider.       <> Summary of the Discussion: Dr. Ling  This note was created using Dragon Medical Dictation. There may be typographical errors secondary to dictation.                    ED Course      Clinical Impression:     1. Acute nonintractable headache, unspecified headache type       Disposition:   Disposition: Discharged  Condition: Stable                        Brittni Keita PA-C  10/12/17 7015

## 2017-10-12 NOTE — ED NOTES
"Pt to Ed with c/o migraine that began around approx. 0600 this AM. Pt reporting she usually gets migraine q 6 months and reports "this feels like a migraine I usually get." Pt with pain to bilateral temples, nose, and forehead. Pt AAOx4 and appropriate at this time. Respirations even and unlabored. No acute distress noted. Pt reports pain 10/10. Pt with PMH of diabetes, reporting her sugar was "in the 140's earlier this AM"  "

## 2017-10-16 ENCOUNTER — ROUTINE PRENATAL (OUTPATIENT)
Dept: MATERNAL FETAL MEDICINE | Facility: CLINIC | Age: 37
End: 2017-10-16
Payer: COMMERCIAL

## 2017-10-16 ENCOUNTER — HOSPITAL ENCOUNTER (INPATIENT)
Facility: OTHER | Age: 37
LOS: 2 days | Discharge: HOME | End: 2017-10-18
Attending: OBSTETRICS & GYNECOLOGY | Admitting: OBSTETRICS & GYNECOLOGY
Payer: COMMERCIAL

## 2017-10-16 ENCOUNTER — PATIENT MESSAGE (OUTPATIENT)
Dept: ENDOCRINOLOGY | Facility: CLINIC | Age: 37
End: 2017-10-16

## 2017-10-16 VITALS
BODY MASS INDEX: 29.02 KG/M2 | SYSTOLIC BLOOD PRESSURE: 112 MMHG | DIASTOLIC BLOOD PRESSURE: 70 MMHG | WEIGHT: 174.38 LBS

## 2017-10-16 DIAGNOSIS — R73.9 BLOOD GLUCOSE ELEVATED: ICD-10-CM

## 2017-10-16 DIAGNOSIS — E10.65 UNCONTROLLED TYPE 1 DIABETES MELLITUS WITH HYPERGLYCEMIA: ICD-10-CM

## 2017-10-16 DIAGNOSIS — E10.65 TYPE 1 DIABETES MELLITUS WITH HYPERGLYCEMIA: ICD-10-CM

## 2017-10-16 DIAGNOSIS — O24.012 TYPE 1 DIABETES MELLITUS COMPLICATING PREGNANCY IN SECOND TRIMESTER, ANTEPARTUM: ICD-10-CM

## 2017-10-16 LAB
ABO + RH BLD: NORMAL
ALLENS TEST: ABNORMAL
ANION GAP SERPL CALC-SCNC: 9 MMOL/L
B-OH-BUTYR BLD STRIP-SCNC: 0.1 MMOL/L
BASOPHILS # BLD AUTO: 0 K/UL
BASOPHILS NFR BLD: 0 %
BILIRUB SERPL-MCNC: NORMAL MG/DL
BILIRUB UR QL STRIP: NEGATIVE
BLD GP AB SCN CELLS X3 SERPL QL: NORMAL
BLOOD URINE, POC: NORMAL
BUN SERPL-MCNC: 6 MG/DL
CALCIUM SERPL-MCNC: 9.6 MG/DL
CHLORIDE SERPL-SCNC: 103 MMOL/L
CLARITY UR: CLEAR
CO2 SERPL-SCNC: 23 MMOL/L
COLOR UR: YELLOW
COLOR, POC UA: NORMAL
CREAT SERPL-MCNC: 0.6 MG/DL
DELSYS: ABNORMAL
DIFFERENTIAL METHOD: ABNORMAL
EOSINOPHIL # BLD AUTO: 0 K/UL
EOSINOPHIL NFR BLD: 0.3 %
ERYTHROCYTE [DISTWIDTH] IN BLOOD BY AUTOMATED COUNT: 12.1 %
EST. GFR  (AFRICAN AMERICAN): >60 ML/MIN/1.73 M^2
EST. GFR  (NON AFRICAN AMERICAN): >60 ML/MIN/1.73 M^2
GLUCOSE SERPL-MCNC: 88 MG/DL
GLUCOSE UR QL STRIP: ABNORMAL
GLUCOSE UR QL STRIP: NORMAL
HCO3 UR-SCNC: 22 MMOL/L (ref 24–28)
HCT VFR BLD AUTO: 34.8 %
HGB BLD-MCNC: 12.1 G/DL
HGB UR QL STRIP: ABNORMAL
KETONES UR QL STRIP: NEGATIVE
KETONES UR QL STRIP: NORMAL
LEUKOCYTE ESTERASE UR QL STRIP: NEGATIVE
LEUKOCYTE ESTERASE URINE, POC: NORMAL
LYMPHOCYTES # BLD AUTO: 1.5 K/UL
LYMPHOCYTES NFR BLD: 22.5 %
MCH RBC QN AUTO: 32 PG
MCHC RBC AUTO-ENTMCNC: 34.8 G/DL
MCV RBC AUTO: 92 FL
MONOCYTES # BLD AUTO: 0.4 K/UL
MONOCYTES NFR BLD: 5.5 %
NEUTROPHILS # BLD AUTO: 4.8 K/UL
NEUTROPHILS NFR BLD: 71.4 %
NITRITE UR QL STRIP: NEGATIVE
NITRITE, POC UA: NORMAL
PCO2 BLDA: 31.2 MMHG (ref 35–45)
PH SMN: 7.46 [PH] (ref 7.35–7.45)
PH UR STRIP: 7 [PH] (ref 5–8)
PH, POC UA: NORMAL
PLATELET # BLD AUTO: 210 K/UL
PMV BLD AUTO: 10.3 FL
PO2 BLDA: 109 MMHG (ref 80–100)
POC BE: -2 MMOL/L
POC SATURATED O2: 99 % (ref 95–100)
POCT GLUCOSE: 190 MG/DL (ref 70–110)
POCT GLUCOSE: 91 MG/DL (ref 70–110)
POTASSIUM SERPL-SCNC: 3.8 MMOL/L
PROT UR QL STRIP: NEGATIVE
PROTEIN, POC: NORMAL
RBC # BLD AUTO: 3.78 M/UL
SAMPLE: ABNORMAL
SITE: ABNORMAL
SODIUM SERPL-SCNC: 135 MMOL/L
SP GR UR STRIP: 1.01 (ref 1–1.03)
SPECIFIC GRAVITY, POC UA: NORMAL
URN SPEC COLLECT METH UR: ABNORMAL
UROBILINOGEN UR STRIP-ACNC: 1 EU/DL
UROBILINOGEN, POC UA: NORMAL
WBC # BLD AUTO: 6.77 K/UL

## 2017-10-16 PROCEDURE — 87086 URINE CULTURE/COLONY COUNT: CPT

## 2017-10-16 PROCEDURE — 86901 BLOOD TYPING SEROLOGIC RH(D): CPT

## 2017-10-16 PROCEDURE — 82010 KETONE BODYS QUAN: CPT

## 2017-10-16 PROCEDURE — 86900 BLOOD TYPING SEROLOGIC ABO: CPT

## 2017-10-16 PROCEDURE — 99214 OFFICE O/P EST MOD 30 MIN: CPT | Mod: S$GLB,,, | Performed by: OBSTETRICS & GYNECOLOGY

## 2017-10-16 PROCEDURE — 36415 COLL VENOUS BLD VENIPUNCTURE: CPT

## 2017-10-16 PROCEDURE — 36600 WITHDRAWAL OF ARTERIAL BLOOD: CPT

## 2017-10-16 PROCEDURE — 82803 BLOOD GASES ANY COMBINATION: CPT

## 2017-10-16 PROCEDURE — 81003 URINALYSIS AUTO W/O SCOPE: CPT

## 2017-10-16 PROCEDURE — 63600175 PHARM REV CODE 636 W HCPCS: Performed by: STUDENT IN AN ORGANIZED HEALTH CARE EDUCATION/TRAINING PROGRAM

## 2017-10-16 PROCEDURE — 99222 1ST HOSP IP/OBS MODERATE 55: CPT | Mod: ,,, | Performed by: OBSTETRICS & GYNECOLOGY

## 2017-10-16 PROCEDURE — 11000001 HC ACUTE MED/SURG PRIVATE ROOM

## 2017-10-16 PROCEDURE — 85025 COMPLETE CBC W/AUTO DIFF WBC: CPT

## 2017-10-16 PROCEDURE — 4A1HXCZ MONITORING OF PRODUCTS OF CONCEPTION, CARDIAC RATE, EXTERNAL APPROACH: ICD-10-PCS | Performed by: OBSTETRICS & GYNECOLOGY

## 2017-10-16 PROCEDURE — 80048 BASIC METABOLIC PNL TOTAL CA: CPT

## 2017-10-16 PROCEDURE — 99999 PR PBB SHADOW E&M-EST. PATIENT-LVL II: CPT | Mod: PBBFAC,,, | Performed by: OBSTETRICS & GYNECOLOGY

## 2017-10-16 RX ORDER — DIPHENHYDRAMINE HCL 25 MG
25 CAPSULE ORAL EVERY 4 HOURS PRN
Status: DISCONTINUED | OUTPATIENT
Start: 2017-10-16 | End: 2017-10-18 | Stop reason: HOSPADM

## 2017-10-16 RX ORDER — SIMETHICONE 80 MG
1 TABLET,CHEWABLE ORAL EVERY 6 HOURS PRN
Status: DISCONTINUED | OUTPATIENT
Start: 2017-10-16 | End: 2017-10-18 | Stop reason: HOSPADM

## 2017-10-16 RX ORDER — DIPHENHYDRAMINE HYDROCHLORIDE 50 MG/ML
25 INJECTION INTRAMUSCULAR; INTRAVENOUS EVERY 4 HOURS PRN
Status: DISCONTINUED | OUTPATIENT
Start: 2017-10-16 | End: 2017-10-18 | Stop reason: HOSPADM

## 2017-10-16 RX ORDER — INSULIN ASPART 100 [IU]/ML
14 INJECTION, SOLUTION INTRAVENOUS; SUBCUTANEOUS
Status: DISCONTINUED | OUTPATIENT
Start: 2017-10-16 | End: 2017-10-17

## 2017-10-16 RX ORDER — ONDANSETRON 8 MG/1
8 TABLET, ORALLY DISINTEGRATING ORAL EVERY 8 HOURS PRN
Status: DISCONTINUED | OUTPATIENT
Start: 2017-10-16 | End: 2017-10-18 | Stop reason: HOSPADM

## 2017-10-16 RX ORDER — INSULIN ASPART 100 [IU]/ML
14 INJECTION, SOLUTION INTRAVENOUS; SUBCUTANEOUS
Status: DISCONTINUED | OUTPATIENT
Start: 2017-10-17 | End: 2017-10-16

## 2017-10-16 RX ORDER — AMOXICILLIN 250 MG
1 CAPSULE ORAL NIGHTLY PRN
Status: DISCONTINUED | OUTPATIENT
Start: 2017-10-16 | End: 2017-10-18 | Stop reason: HOSPADM

## 2017-10-16 RX ORDER — PRENATAL WITH FERROUS FUM AND FOLIC ACID 3080; 920; 120; 400; 22; 1.84; 3; 20; 10; 1; 12; 200; 27; 25; 2 [IU]/1; [IU]/1; MG/1; [IU]/1; MG/1; MG/1; MG/1; MG/1; MG/1; MG/1; UG/1; MG/1; MG/1; MG/1; MG/1
1 TABLET ORAL DAILY
Status: DISCONTINUED | OUTPATIENT
Start: 2017-10-17 | End: 2017-10-18 | Stop reason: HOSPADM

## 2017-10-16 RX ADMIN — INSULIN ASPART 14 UNITS: 100 INJECTION, SOLUTION INTRAVENOUS; SUBCUTANEOUS at 06:10

## 2017-10-16 RX ADMIN — INSULIN DETEMIR 18 UNITS: 100 INJECTION, SOLUTION SUBCUTANEOUS at 09:10

## 2017-10-16 NOTE — HOSPITAL COURSE
10/16/2017- Patient admitted from [facility] clinic. Workup for DKA negative on admission. Started on home insulin regimen of levemir qHS and aspart TID with meals. Plan for glucose patterning and dose adjustments accordingly.  10/17/2017-  Blood sugars elevated at 2AM to 300s, given additional sliding scale. Monitor blood glucose fasting, 2 hour PP, and 2AM. Continuing to adjust insulin dosing.  10/18/2017- Continuing to adjust insulin regimen. Patient seen by diabetic education yesterday.

## 2017-10-16 NOTE — NURSING
Pt to hospital, instructed to check in on 6th floor L&D and then down to 3rd floor antepartum for room and meet RN.    Pt also informed of f/u BS check next Tuesday 10/24 at 3pm, pending hospital discharge.    Pt verbalized understanding of information.

## 2017-10-16 NOTE — HPI
Jimena Hazel is a 36 y.o. [id number] at 14w5d presenting from [facility] clinic for management of blood sugars.  Patient has a history of type I DM diagnosed 3 years ago. She currently takes levemir 18 QHS and aspart 14 w/ meals (recently increased from 10 per endocrinologist).  She does a sliding scale if needed which she states is most meals.  She states her blood sugars are as high as 260-300 with intermittent low blood sugars as low as 30-50.   Patient currently feels well. She denies nausea or vomiting. She denies abdominal pain, cramping, or vaginal bleeding.   She states she has no other medical problems, she does not take any other medications. She has had two prior C/S in previous pregnancies.

## 2017-10-16 NOTE — H&P
[facility]  Obstetrics  History & Physical    Patient Name: Jimena Hazel  MRN: [id number]  Admission Date: 10/16/2017  Primary Care Provider: Levi Doyle MD    Subjective:     Principal Problem:Type 1 diabetes mellitus with hyperglycemia    History of Present Illness:  Jimena Hazel is a 36 y.o. [id number] at 14w5d presenting from [facility] clinic for management of blood sugars.  Patient has a history of type I DM diagnosed 3 years ago. She currently takes levemir 18 QHS and aspart 14 w/ meals (recently increased from 10 per endocrinologist).  She does a sliding scale if needed which she states is most meals.  She states her blood sugars are as high as 260-300 with intermittent low blood sugars as low as 30-50.   Patient currently feels well. She denies nausea or vomiting. She denies abdominal pain, cramping, or vaginal bleeding.   She states she has no other medical problems, she does not take any other medications. She has had two prior C/S in previous pregnancies.         Obstetric History       T2      L2     SAB0   TAB0   Ectopic0   Multiple0   Live Births0       # Outcome Date GA Lbr Sunny/2nd Weight Sex Delivery Anes PTL Lv   3 Current            2 Term 11 40w0d  4.479 kg (9 lb 14 oz) M CS-Unspec EPI        Name: Agnieszka Rios Term 03 42w0d  3.515 kg (7 lb 12 oz) M CS-Unspec EPI N       Name: Will        Past Medical History:   Diagnosis Date    Diabetes mellitus     Diabetes mellitus type I     Irregular heartbeat      Past Surgical History:   Procedure Laterality Date     SECTION, LOW TRANSVERSE         PTA Medications   Medication Sig    acetaminophen (TYLENOL) 325 MG tablet Take 500 mg by mouth every 6 (six) hours as needed for Pain.    blood sugar diagnostic Strp Check glucose 8x/day    glucagon (human recombinant) inj 1mg/mL kit Follow package directions for low blood sugar.    insulin aspart (NOVOLOG) 100 unit/mL InPn pen Take 14 units  "subcutaneous with meals plus correction scale (max total daily dose of 72  units) (Patient taking differently: Take 14 units subcutaneous with meals plus correction scale (max total daily dose of 72  Units)    10/16: Patient taking 10 units with Breakfast, Lunch and Dinner)    insulin detemir (LEVEMIR FLEXTOUCH) 100 unit/mL (3 mL) SubQ InPn pen Inject 18 Units into the skin every evening. (Patient taking differently: Inject 18 Units into the skin every evening. 10/16: Patient taking 16 units with bedtime)    pen needle, diabetic (BD INSULIN PEN NEEDLE UF MINI) 31 gauge x 3/16" Ndle To use 5-6x/day with insulin injections.       Review of patient's allergies indicates:  No Known Allergies     Family History     Problem Relation (Age of Onset)    Diabetes Mother, Father    Hypertension Mother    No Known Problems Sister, Brother        Social History Main Topics    Smoking status: Never Smoker    Smokeless tobacco: Never Used    Alcohol use No    Drug use: No    Sexual activity: Yes     Partners: Male     Birth control/ protection: None     Review of Systems   Constitutional: Negative for chills and fever.   Respiratory: Negative for shortness of breath and wheezing.    Cardiovascular: Negative for chest pain.   Gastrointestinal: Negative for abdominal pain, diarrhea, nausea and vomiting.   Genitourinary: Negative for dysuria, hematuria, pelvic pain, vaginal bleeding and vaginal pain.   Neurological: Negative for headaches.      Objective:     Vital Signs (Most Recent):  Temp: 97.7 °F (36.5 °C) (10/16/17 1622)  Pulse: 88 (10/16/17 1622)  Resp: 16 (10/16/17 1622)  BP: 113/78 (10/16/17 1622)  SpO2: 100 % (10/16/17 1622) Vital Signs (24h Range):  Temp:  [97.7 °F (36.5 °C)] 97.7 °F (36.5 °C)  Pulse:  [88] 88  Resp:  [16] 16  SpO2:  [100 %] 100 %  BP: (112-113)/(70-78) 113/78     Weight: 78.9 kg (174 lb)  Body mass index is 28.96 kg/m².    FHT: 145-150    Physical Exam:   Constitutional: She is oriented to person, " place, and time. She appears well-developed and well-nourished.     Eyes: EOM are normal.    Neck: Normal range of motion.    Cardiovascular: Normal rate.     Pulmonary/Chest: Effort normal. No respiratory distress. She has no wheezes.        Abdominal: Soft. She exhibits no distension. There is no tenderness. There is no rebound and no guarding.             Musculoskeletal: Normal range of motion.       Neurological: She is alert and oriented to person, place, and time.    Skin: Skin is warm and dry.    Psychiatric: She has a normal mood and affect. Her behavior is normal. Judgment and thought content normal.       Cervix:  Deferred     Significant Labs:  Lab Results   Component Value Date    GROUPTRH A POS 2017    HEPBSAG Negative 2017       I have personallly reviewed all pertinent lab results from the last 24 hours.    Assessment/Plan:     36 y.o. female  at 14w5d for:    * Type 1 diabetes mellitus with hyperglycemia    - Workup for DKA pending including CBC, BMP, B hydroxybutyrate, UA, urine culture, and ABG  - Random glucose 91, reassuring    - ABG pH 7.456, PCO2 31.2, PO2 109, HCO3 22 BE -2  - Trace ketones on UDip  - Last HbA1c  : 7.6    - Management pending workup including insulin gtt if patient is in DKA (workup negative thus far) vs blood sugar patterning and adjusting insulin regimen according  - Blood sugar patterning will include fasting, 2 hr PP, and 2AM. Will continue home regimen levemir 18 QHS and Aspart w/ meals. Will adjust accordingly.        14 weeks gestation of pregnancy    - [facility]            Pati Briones MD  Obstetrics  [facility]

## 2017-10-16 NOTE — PROGRESS NOTES
Pt returns today for follow-up of IDDM.  /70  FHTs 147-150 bpm.  Was in [facility] ER on Thursday with severe headache; given IV hydration.  Saw endocrine last week who increased levemir to 18 units qhs; 14 units novolog with meals.  BS reviewed (will scan)  FBS:   Post breakfast: 148-313  Post lunch: 102-285  Post dinner:   Discussed evidence of poor control and given potential impact on pregnancy, recommend admission to hospital to rule out DKA and insulin titration. Recommend diabetic education to help with carb counting. If evidence of DKA, recommend admission to ICU for IVF and insulin gtt.  The approximate physician face to face time was 25 minutes. The majority of time (greater than 50%) was spent on counseling of the patient or coordination of care.

## 2017-10-16 NOTE — PLAN OF CARE
Problem: Patient Care Overview  Goal: Plan of Care Review  Outcome: Ongoing (interventions implemented as appropriate)  Pt oriented to room, call light within reach, bed in low-locked position. Pt instructed on how to order meals and to call when meal arrives to receive insulin, pt verbalized understanding. VSS. POCT blood sugar on admit 91. FHT verified with doppler. Pt requests to wait until later this evening to wear TEDs/SCDs. Plan of care reviewed with patient who verbalized understanding and had no questions at this time.

## 2017-10-16 NOTE — ASSESSMENT & PLAN NOTE
- Workup for DKA pending including CBC, BMP, B hydroxybutyrate, UA, urine culture, and ABG  - Random glucose 91, reassuring    - ABG pH 7.456, PCO2 31.2, PO2 109, HCO3 22 BE -2  - Trace ketones on UDip  - Last HbA1c 9/18 : 7.6    - Management pending workup including insulin gtt if patient is in DKA (workup negative thus far) vs blood sugar patterning and adjusting insulin regimen according  - Blood sugar patterning will include fasting, 2 hr PP, and 2AM. Will continue home regimen levemir 18 QHS and Aspart w/ meals.

## 2017-10-16 NOTE — SUBJECTIVE & OBJECTIVE
"  Obstetric History       T2      L2     SAB0   TAB0   Ectopic0   Multiple0   Live Births0       # Outcome Date GA Lbr Sunny/2nd Weight Sex Delivery Anes PTL Lv   3 Current            2 Term 11 40w0d  4.479 kg (9 lb 14 oz) M CS-Unspec EPI        Name: Agnieszka Rios Term 03 42w0d  3.515 kg (7 lb 12 oz) M CS-Unspec EPI N       Name: Will        Past Medical History:   Diagnosis Date    Diabetes mellitus     Diabetes mellitus type I     Irregular heartbeat      Past Surgical History:   Procedure Laterality Date     SECTION, LOW TRANSVERSE         PTA Medications   Medication Sig    acetaminophen (TYLENOL) 325 MG tablet Take 500 mg by mouth every 6 (six) hours as needed for Pain.    blood sugar diagnostic Strp Check glucose 8x/day    glucagon (human recombinant) inj 1mg/mL kit Follow package directions for low blood sugar.    insulin aspart (NOVOLOG) 100 unit/mL InPn pen Take 14 units subcutaneous with meals plus correction scale (max total daily dose of 72  units) (Patient taking differently: Take 14 units subcutaneous with meals plus correction scale (max total daily dose of 72  Units)    1016: Patient taking 10 units with Breakfast, Lunch and Dinner)    insulin detemir (LEVEMIR FLEXTOUCH) 100 unit/mL (3 mL) SubQ InPn pen Inject 18 Units into the skin every evening. (Patient taking differently: Inject 18 Units into the skin every evening. 10/16: Patient taking 16 units with bedtime)    pen needle, diabetic (BD INSULIN PEN NEEDLE UF MINI) 31 gauge x 3/16" Ndle To use 5-6x/day with insulin injections.       Review of patient's allergies indicates:  No Known Allergies     Family History     Problem Relation (Age of Onset)    Diabetes Mother, Father    Hypertension Mother    No Known Problems Sister, Brother        Social History Main Topics    Smoking status: Never Smoker    Smokeless tobacco: Never Used    Alcohol use No    Drug use: No    Sexual activity: Yes     " Partners: Male     Birth control/ protection: None     Review of Systems   Constitutional: Negative for chills and fever.   Respiratory: Negative for shortness of breath and wheezing.    Cardiovascular: Negative for chest pain.   Gastrointestinal: Negative for abdominal pain, diarrhea, nausea and vomiting.   Genitourinary: Negative for dysuria, hematuria, pelvic pain, vaginal bleeding and vaginal pain.   Neurological: Negative for headaches.      Objective:     Vital Signs (Most Recent):  Temp: 97.7 °F (36.5 °C) (10/16/17 1622)  Pulse: 88 (10/16/17 1622)  Resp: 16 (10/16/17 1622)  BP: 113/78 (10/16/17 1622)  SpO2: 100 % (10/16/17 1622) Vital Signs (24h Range):  Temp:  [97.7 °F (36.5 °C)] 97.7 °F (36.5 °C)  Pulse:  [88] 88  Resp:  [16] 16  SpO2:  [100 %] 100 %  BP: (112-113)/(70-78) 113/78     Weight: 78.9 kg (174 lb)  Body mass index is 28.96 kg/m².    FHT: **    Physical Exam:   Constitutional: She is oriented to person, place, and time. She appears well-developed and well-nourished.     Eyes: EOM are normal.    Neck: Normal range of motion.    Cardiovascular: Normal rate.     Pulmonary/Chest: Effort normal. No respiratory distress. She has no wheezes.        Abdominal: Soft. She exhibits no distension. There is no tenderness. There is no rebound and no guarding.             Musculoskeletal: Normal range of motion.       Neurological: She is alert and oriented to person, place, and time.    Skin: Skin is warm and dry.    Psychiatric: She has a normal mood and affect. Her behavior is normal. Judgment and thought content normal.       Cervix:  Deferred     Significant Labs:  Lab Results   Component Value Date    GROUPTRH A POS 09/18/2017    HEPBSAG Negative 09/18/2017       I have personallly reviewed all pertinent lab results from the last 24 hours.

## 2017-10-17 LAB
BACTERIA UR CULT: NORMAL
POCT GLUCOSE: 142 MG/DL (ref 70–110)
POCT GLUCOSE: 142 MG/DL (ref 70–110)
POCT GLUCOSE: 151 MG/DL (ref 70–110)
POCT GLUCOSE: 170 MG/DL (ref 70–110)
POCT GLUCOSE: 198 MG/DL (ref 70–110)
POCT GLUCOSE: 201 MG/DL (ref 70–110)
POCT GLUCOSE: 239 MG/DL (ref 70–110)
POCT GLUCOSE: 326 MG/DL (ref 70–110)

## 2017-10-17 PROCEDURE — 11000001 HC ACUTE MED/SURG PRIVATE ROOM

## 2017-10-17 PROCEDURE — 63600175 PHARM REV CODE 636 W HCPCS: Performed by: STUDENT IN AN ORGANIZED HEALTH CARE EDUCATION/TRAINING PROGRAM

## 2017-10-17 PROCEDURE — 99232 SBSQ HOSP IP/OBS MODERATE 35: CPT | Mod: ,,, | Performed by: OBSTETRICS & GYNECOLOGY

## 2017-10-17 PROCEDURE — 25000003 PHARM REV CODE 250: Performed by: STUDENT IN AN ORGANIZED HEALTH CARE EDUCATION/TRAINING PROGRAM

## 2017-10-17 RX ORDER — INSULIN ASPART 100 [IU]/ML
18 INJECTION, SOLUTION INTRAVENOUS; SUBCUTANEOUS
Status: DISCONTINUED | OUTPATIENT
Start: 2017-10-18 | End: 2017-10-18 | Stop reason: HOSPADM

## 2017-10-17 RX ORDER — INSULIN ASPART 100 [IU]/ML
14 INJECTION, SOLUTION INTRAVENOUS; SUBCUTANEOUS
Status: DISCONTINUED | OUTPATIENT
Start: 2017-10-18 | End: 2017-10-17

## 2017-10-17 RX ORDER — INSULIN ASPART 100 [IU]/ML
20 INJECTION, SOLUTION INTRAVENOUS; SUBCUTANEOUS
Status: DISCONTINUED | OUTPATIENT
Start: 2017-10-17 | End: 2017-10-18 | Stop reason: HOSPADM

## 2017-10-17 RX ORDER — INSULIN ASPART 100 [IU]/ML
16 INJECTION, SOLUTION INTRAVENOUS; SUBCUTANEOUS
Status: COMPLETED | OUTPATIENT
Start: 2017-10-17 | End: 2017-10-17

## 2017-10-17 RX ORDER — INSULIN ASPART 100 [IU]/ML
5 INJECTION, SOLUTION INTRAVENOUS; SUBCUTANEOUS ONCE
Status: COMPLETED | OUTPATIENT
Start: 2017-10-17 | End: 2017-10-17

## 2017-10-17 RX ORDER — INSULIN ASPART 100 [IU]/ML
18 INJECTION, SOLUTION INTRAVENOUS; SUBCUTANEOUS
Status: DISCONTINUED | OUTPATIENT
Start: 2017-10-17 | End: 2017-10-17

## 2017-10-17 RX ORDER — NAPROXEN SODIUM 220 MG/1
81 TABLET, FILM COATED ORAL DAILY
Status: DISCONTINUED | OUTPATIENT
Start: 2017-10-17 | End: 2017-10-18 | Stop reason: HOSPADM

## 2017-10-17 RX ADMIN — INSULIN ASPART 20 UNITS: 100 INJECTION, SOLUTION INTRAVENOUS; SUBCUTANEOUS at 09:10

## 2017-10-17 RX ADMIN — INSULIN DETEMIR 22 UNITS: 100 INJECTION, SOLUTION SUBCUTANEOUS at 09:10

## 2017-10-17 RX ADMIN — ASPIRIN 81 MG CHEWABLE TABLET 81 MG: 81 TABLET CHEWABLE at 09:10

## 2017-10-17 RX ADMIN — INSULIN ASPART 16 UNITS: 100 INJECTION, SOLUTION INTRAVENOUS; SUBCUTANEOUS at 07:10

## 2017-10-17 RX ADMIN — INSULIN ASPART 14 UNITS: 100 INJECTION, SOLUTION INTRAVENOUS; SUBCUTANEOUS at 01:10

## 2017-10-17 RX ADMIN — INSULIN ASPART 5 UNITS: 100 INJECTION, SOLUTION INTRAVENOUS; SUBCUTANEOUS at 02:10

## 2017-10-17 RX ADMIN — PRENATAL VIT W/ FE FUMARATE-FA TAB 27-0.8 MG 1 EACH: 27-0.8 TAB at 08:10

## 2017-10-17 NOTE — PLAN OF CARE
Problem: Patient Care Overview  Goal: Plan of Care Review  Outcome: Ongoing (interventions implemented as appropriate)  VSS throughout shift. Pt afebrile. Pt denies ctxs, LOF, VB & pain. POCT BS checked per orders; pt educated on insulin administration and dosing adjustments. No needs at this time. Call light within reach with bed locked in lowest position. Will continue to monitor.

## 2017-10-17 NOTE — PROGRESS NOTES
[facility]  Obstetrics  Antepartum Progress Note    Patient Name: Jimena Hazel  MRN: [id number]  Admission Date: 10/16/2017  Hospital Length of Stay: 1 days  Attending Physician: Nidhi Miller MD  Primary Care Provider: Levi Doyle MD    Subjective:     Principal Problem:Type 1 diabetes mellitus with hyperglycemia    HPI:  Jimena Hazel is a 36 y.o. [id number] at 14w5d presenting from [facility] clinic for management of blood sugars.  Patient has a history of type I DM diagnosed 3 years ago. She currently takes levemir 18 QHS and aspart 14 w/ meals (recently increased from 10 per endocrinologist).  She does a sliding scale if needed which she states is most meals.  She states her blood sugars are as high as 260-300 with intermittent low blood sugars as low as 30-50.   Patient currently feels well. She denies nausea or vomiting. She denies abdominal pain, cramping, or vaginal bleeding.   She states she has no other medical problems, she does not take any other medications. She has had two prior C/S in previous pregnancies.       Hospital Course:  10/16/2017- Patient admitted from [facility] clinic. Workup for DKA pending. Management pending lab results.  10/17/2017-  Workup for DKA negative. Blood sugars elevated at 2AM to 300s, given additional sliding scale.  Adjusting medications according. Monitor blood glucose fasting, 2 hour PP, and 2AM.    Obstetric HPI:  No acute events overnight. She states she ate chicken quesadillas, beef broth, and baked fries last night for dinner. 2AM 326, received 5u of additional insulin. Patient states she does do carb counting at home. She slides as needed, which is most meals.   She currently denies nausea or vomiting. She denies vaginal bleeding or cramping.      Objective:     Vital Signs (Most Recent):  Temp: 97.4 °F (36.3 °C) (10/17/17 0603)  Pulse: 78 (10/17/17 0603)  Resp: 14 (10/17/17 0603)  BP: (!) 99/56 (10/17/17 06)  SpO2: 100 % (10/16/17 1622) Vital Signs  (24h Range):  Temp:  [97.4 °F (36.3 °C)-98.1 °F (36.7 °C)] 97.4 °F (36.3 °C)  Pulse:  [78-95] 78  Resp:  [14-16] 14  SpO2:  [100 %] 100 %  BP: ()/(56-78) 99/56     Weight: 78.9 kg (174 lb)  Body mass index is 28.96 kg/m².    FHT: 145-150 bpm last night    No intake or output data in the 24 hours ending 10/17/17 0636    Cervical Exam: Deferred     Significant Labs:  Recent Lab Results       10/17/17  0602 10/17/17  0401 10/17/17  0153 10/16/17  2101 10/16/17  1716      Color, UA          Bilirubin          Spec Grav UA          pH, UA          Protein          Urobilinogen, UA          Nitrite, UA          Allens Test          Anion Gap          Appearance, UA          Baso #          Basophil%          Beta-Hydroxybutyrate          Bilirubin (UA)          Site          BUN, Bld          Calcium          Chloride          CO2          Color, UA          Creatinine          DelSys          Differential Method          eGFR if           eGFR if non           Eos #          Eosinophil%          Glucose          Glucose, UA          Glucose, UA          Gran #          Gran%          Group & Rh     A POS     Hematocrit          Hemoglobin          INDIRECT BERNADETTE     NEG     Ketones, UA          Leukocytes, UA          Lymph #          Lymph%          MCH          MCHC          MCV          Mono #          Mono%          MPV          Nitrite, UA          Occult Blood UA          pH, UA          Platelets          POC BE          POC HCO3          POC PCO2          POC PH          POC PO2          POC SATURATED O2          POCT Glucose 142(H) 201(H) 326(H) 190(H)      Potassium          Protein, UA          RBC          RBC, UA          RDW          Sample          Sodium          Specific Gravity, UA          Specimen UA          Urobilinogen, UA          WBC, UA          WBC                      10/16/17  1714 10/16/17  1704 10/16/17  1702 10/16/17  1638 10/16/17  1632      Color,  UA   clear, yellow       Bilirubin   neg       Spec Grav UA          pH, UA          Protein   trace       Urobilinogen, UA   neg       Nitrite, UA   neg       Allens Test  Pass        Anion Gap 9         Appearance, UA     Clear     Baso # 0.00         Basophil% 0.0         Beta-Hydroxybutyrate   0.1       Bilirubin (UA)     Negative     Site  LR        BUN, Bld 6         Calcium 9.6         Chloride 103         CO2 23         Color, UA     Yellow     Creatinine 0.6         DelSys  Room Air        Differential Method Automated         eGFR if  >60         eGFR if non  >60  Comment:  Calculation used to obtain the estimated glomerular filtration  rate (eGFR) is the CKD-EPI equation. Since race is unknown   in our information system, the eGFR values for   -American and Non--American patients are given   for each creatinine result.           Eos # 0.0         Eosinophil% 0.3         Glucose 88         Glucose, UA   neg       Glucose, UA     2+(A)     Gran # 4.8         Gran% 71.4         Group & Rh          Hematocrit 34.8(L)         Hemoglobin 12.1         INDIRECT BERNADETTE          Ketones, UA   trace  Negative     Leukocytes, UA     Negative     Lymph # 1.5         Lymph% 22.5         MCH 32.0(H)         MCHC 34.8         MCV 92         Mono # 0.4         Mono% 5.5         MPV 10.3         Nitrite, UA     Negative     Occult Blood UA     Trace(A)     pH, UA     7.0     Platelets 210         POC BE  -2        POC HCO3  22.0(L)        POC PCO2  31.2(L)        POC PH  7.456(H)        POC PO2  109(H)        POC SATURATED O2  99        POCT Glucose    91      Potassium 3.8  Comment:  Specimen slightly hemolyzed         Protein, UA     Negative  Comment:  Recommend a 24 hour urine protein or a urine   protein/creatinine ratio if globulin induced proteinuria is  clinically suspected.       RBC 3.78(L)         RBC, UA   neg       RDW 12.1         Sample  ARTERIAL        Sodium  135(L)         Specific [place], UA     1.015     Specimen UA     Urine, Clean Catch     Urobilinogen, UA     1.0     WBC, UA   neg       WBC 6.77                         Physical Exam:   Constitutional: She is oriented to person, place, and time. She appears well-developed and well-nourished.     Eyes: EOM are normal.    Neck: Normal range of motion.    Cardiovascular: Normal rate.     Pulmonary/Chest: Effort normal. No respiratory distress. She has no wheezes.        Abdominal: Soft. She exhibits no distension. There is no tenderness. There is no rebound and no guarding.             Musculoskeletal: Normal range of motion.       Neurological: She is alert and oriented to person, place, and time.    Skin: Skin is warm and dry.    Psychiatric: She has a normal mood and affect. Her behavior is normal. Judgment and thought content normal.       Assessment/Plan:     36 y.o. female  at 14w6d for:    * Type 1 diabetes mellitus with hyperglycemia    - Workup for DKA negative   ABG pH 7.456 CO2 31.2 HCO3 22.0, UA tr ketones/tr protein, 12.1/34.8/210, bHB 0.1  BMP wnl xNa 135  - Random glucose 91, reassuring  - Urine culture pending  - Last HbA1c  : 7.6  - Home regimen levemir 18 Qhs, Novolog 14 WM                  F            pB            pL          pD          2AM  10/16                                                   190         326 (5u)>201  10/17      142  - Will increase AM aspart to 20  - Continue to adjust insulin medications as needed  - Diabetic education consult placed        14 weeks gestation of pregnancy    - Ts qshi              Pati Briones MD  Obstetrics  [facility]

## 2017-10-17 NOTE — CONSULTS
"Pt seen by diabetes education. Type I diagnosed about 3 years ago, denies receiving formal DM ed since diagnosis. Recently started seeing endocrine at [facility]. Pt with very erradic BG. Admitted for patterning.     Discussed challenges of tighter glycemic control during pregnancy d/t hormones, stressed importance of frequent monitoring to ensure tight control and adequate insulin dosage.    Pt denies knowledge of CHO counting, able to identify foods that do raise blood sugar, based off of personal monitoring hx. In depth instruction on CHO counting. Suggested 30-45gm for breakfast, 45-60gm for lunch and dinner - and 15gm per snack. Discussed label reading, serving sizes, complex vs. Simple CHO, pairing w/ protein/fat source. Denies drinking sweet drinks.     Upon review of serving sizes, mealtimes/insulin dosing patient feels like she has probably been eating too many CHO in one sitting, and perhaps giving mealtime insulin shots too close together, causing low BG. At one point, she was giving mealtime shots after completing meal - this has since been corrected, although she still has had recent nocturnal hypoglycemia. Pt had also not been taking levemir at consistent time - stressed importance of dosing at same time each PM to avoid stacking/gaps in coverage.      Stressed importance of consistent CHO (gm as per rec) meals to help with insulin dosing. Practiced CHO counting with lunch tray in room.     Discussed goals of BG control: no hypoglycemic episodes to avoid liver dumping/delayed highs; attain BG goals for pregnancy: fasting <95, PP <120. Discussed goal of getting right novolog dose to avoid repeated use of sliding scale to "catch up."     Pt feels much more confident with goals of DM management during pregnancy. Discussed options of returning outpatient DM ed as needed.     Total time spend with patient: 60 min.       "

## 2017-10-17 NOTE — SUBJECTIVE & OBJECTIVE
Obstetric HPI:  No acute events overnight. She states she ate chicken quesadillas, beef broth, and baked fries last night for dinner. 2AM 326, received 5u of additional insulin. Patient states she does do carb counting at home. She slides as needed, which is most meals.   She currently denies nausea or vomiting. She denies vaginal bleeding or cramping.      Objective:     Vital Signs (Most Recent):  Temp: 97.4 °F (36.3 °C) (10/17/17 0603)  Pulse: 78 (10/17/17 0603)  Resp: 14 (10/17/17 0603)  BP: (!) 99/56 (10/17/17 0603)  SpO2: 100 % (10/16/17 1622) Vital Signs (24h Range):  Temp:  [97.4 °F (36.3 °C)-98.1 °F (36.7 °C)] 97.4 °F (36.3 °C)  Pulse:  [78-95] 78  Resp:  [14-16] 14  SpO2:  [100 %] 100 %  BP: ()/(56-78) 99/56     Weight: 78.9 kg (174 lb)  Body mass index is 28.96 kg/m².    FHT: 145-150 bpm last night    No intake or output data in the 24 hours ending 10/17/17 0636    Cervical Exam: Deferred     Significant Labs:  Recent Lab Results       10/17/17  0602 10/17/17  0401 10/17/17  0153 10/16/17  2101 10/16/17  1716      Color, UA          Bilirubin          Spec Grav UA          pH, UA          Protein          Urobilinogen, UA          Nitrite, UA          Allens Test          Anion Gap          Appearance, UA          Baso #          Basophil%          Beta-Hydroxybutyrate          Bilirubin (UA)          Site          BUN, Bld          Calcium          Chloride          CO2          Color, UA          Creatinine          DelSys          Differential Method          eGFR if           eGFR if non           Eos #          Eosinophil%          Glucose          Glucose, UA          Glucose, UA          Gran #          Gran%          Group & Rh     A POS     Hematocrit          Hemoglobin          INDIRECT BERNADETTE     NEG     Ketones, UA          Leukocytes, UA          Lymph #          Lymph%          MCH          MCHC          MCV          Mono #          Mono%          MPV           Nitrite, UA          Occult Blood UA          pH, UA          Platelets          POC BE          POC HCO3          POC PCO2          POC PH          POC PO2          POC SATURATED O2          POCT Glucose 142(H) 201(H) 326(H) 190(H)      Potassium          Protein, UA          RBC          RBC, UA          RDW          Sample          Sodium          Specific Gravity, UA          Specimen UA          Urobilinogen, UA          WBC, UA          WBC                      10/16/17  1714 10/16/17  1704 10/16/17  1702 10/16/17  1638 10/16/17  1632      Color, UA   clear, yellow       Bilirubin   neg       Spec Grav UA          pH, UA          Protein   trace       Urobilinogen, UA   neg       Nitrite, UA   neg       Allens Test  Pass        Anion Gap 9         Appearance, UA     Clear     Baso # 0.00         Basophil% 0.0         Beta-Hydroxybutyrate   0.1       Bilirubin (UA)     Negative     Site  LR        BUN, Bld 6         Calcium 9.6         Chloride 103         CO2 23         Color, UA     Yellow     Creatinine 0.6         DelSys  Room Air        Differential Method Automated         eGFR if  >60         eGFR if non  >60  Comment:  Calculation used to obtain the estimated glomerular filtration  rate (eGFR) is the CKD-EPI equation. Since race is unknown   in our information system, the eGFR values for   -American and Non--American patients are given   for each creatinine result.           Eos # 0.0         Eosinophil% 0.3         Glucose 88         Glucose, UA   neg       Glucose, UA     2+(A)     Gran # 4.8         Gran% 71.4         Group & Rh          Hematocrit 34.8(L)         Hemoglobin 12.1         INDIRECT BERNADETTE          Ketones, UA   trace  Negative     Leukocytes, UA     Negative     Lymph # 1.5         Lymph% 22.5         MCH 32.0(H)         MCHC 34.8         MCV 92         Mono # 0.4         Mono% 5.5         MPV 10.3         Nitrite, UA     Negative      Occult Blood UA     Trace(A)     pH, UA     7.0     Platelets 210         POC BE  -2        POC HCO3  22.0(L)        POC PCO2  31.2(L)        POC PH  7.456(H)        POC PO2  109(H)        POC SATURATED O2  99        POCT Glucose    91      Potassium 3.8  Comment:  Specimen slightly hemolyzed         Protein, UA     Negative  Comment:  Recommend a 24 hour urine protein or a urine   protein/creatinine ratio if globulin induced proteinuria is  clinically suspected.       RBC 3.78(L)         RBC, UA   neg       RDW 12.1         Sample  ARTERIAL        Sodium 135(L)         Specific [place], UA     1.015     Specimen UA     Urine, Clean Catch     Urobilinogen, UA     1.0     WBC, UA   neg       WBC 6.77                         Physical Exam:   Constitutional: She is oriented to person, place, and time. She appears well-developed and well-nourished.     Eyes: EOM are normal.    Neck: Normal range of motion.    Cardiovascular: Normal rate.     Pulmonary/Chest: Effort normal. No respiratory distress. She has no wheezes.        Abdominal: Soft. She exhibits no distension. There is no tenderness. There is no rebound and no guarding.             Musculoskeletal: Normal range of motion.       Neurological: She is alert and oriented to person, place, and time.    Skin: Skin is warm and dry.    Psychiatric: She has a normal mood and affect. Her behavior is normal. Judgment and thought content normal.

## 2017-10-17 NOTE — PROGRESS NOTES
Pt ordering food now.  Will test pre-prandial BG when food arrives and call Dr. Hickman with result.

## 2017-10-17 NOTE — PROGRESS NOTES
0859 - pt's pre-prandial  mg/dL.  Dr. Henry notified.  Administered 20 units of asparte with breakfast.

## 2017-10-17 NOTE — PLAN OF CARE
Problem: Patient Care Overview  Goal: Plan of Care Review  Outcome: Ongoing (interventions implemented as appropriate)  No acute events today.  Pt's BG to be checked pre-prandial, post-prandial, fasting, and at 0600.  Pt's blood glucose has ranged from 142-239.  VSS.  Pt visited by diabetes educator today.  Pt and family demonstrated understanding of plan of care.  Will continue to monitor closely.

## 2017-10-17 NOTE — ASSESSMENT & PLAN NOTE
- Workup for DKA negative   ABG pH 7.456 CO2 31.2 HCO3 22.0, UA tr ketones/tr protein, 12.1/34.8/210, bHB 0.1  BMP wnl xNa 135  - Random glucose 91, reassuring  - Urine culture pending  - Last HbA1c 9/18 : 7.6  - Home regimen levemir 18 Qhs, Novolog 14 WM                  F            pB            pL          pD          2AM  10/16                                                   190         326 (5u)>201  10/17      142  - Will increase AM aspart to 20  - Continue to adjust insulin medications as needed  - Diabetic education consult placed

## 2017-10-18 VITALS
TEMPERATURE: 98 F | SYSTOLIC BLOOD PRESSURE: 101 MMHG | HEART RATE: 88 BPM | OXYGEN SATURATION: 99 % | HEIGHT: 65 IN | BODY MASS INDEX: 28.99 KG/M2 | WEIGHT: 174 LBS | DIASTOLIC BLOOD PRESSURE: 59 MMHG | RESPIRATION RATE: 16 BRPM

## 2017-10-18 LAB
POCT GLUCOSE: 120 MG/DL (ref 70–110)
POCT GLUCOSE: 122 MG/DL (ref 70–110)
POCT GLUCOSE: 134 MG/DL (ref 70–110)
POCT GLUCOSE: 159 MG/DL (ref 70–110)
POCT GLUCOSE: 175 MG/DL (ref 70–110)
POCT GLUCOSE: 223 MG/DL (ref 70–110)
POCT GLUCOSE: 238 MG/DL (ref 70–110)

## 2017-10-18 PROCEDURE — 63600175 PHARM REV CODE 636 W HCPCS: Performed by: STUDENT IN AN ORGANIZED HEALTH CARE EDUCATION/TRAINING PROGRAM

## 2017-10-18 PROCEDURE — 99238 HOSP IP/OBS DSCHRG MGMT 30/<: CPT | Mod: ,,, | Performed by: OBSTETRICS & GYNECOLOGY

## 2017-10-18 PROCEDURE — 25000003 PHARM REV CODE 250: Performed by: STUDENT IN AN ORGANIZED HEALTH CARE EDUCATION/TRAINING PROGRAM

## 2017-10-18 RX ORDER — INSULIN ASPART 100 [IU]/ML
18 INJECTION, SOLUTION INTRAVENOUS; SUBCUTANEOUS
Qty: 16.2 ML | Refills: 3 | Status: ON HOLD | OUTPATIENT
Start: 2017-10-18 | End: 2018-03-12 | Stop reason: HOSPADM

## 2017-10-18 RX ORDER — INSULIN ASPART 100 [IU]/ML
INJECTION, SOLUTION INTRAVENOUS; SUBCUTANEOUS
Qty: 2 BOX | Refills: 6 | Status: SHIPPED | OUTPATIENT
Start: 2017-10-18 | End: 2018-02-16 | Stop reason: SDUPTHER

## 2017-10-18 RX ORDER — INSULIN ASPART 100 [IU]/ML
22 INJECTION, SOLUTION INTRAVENOUS; SUBCUTANEOUS
Qty: 19.8 ML | Refills: 3 | Status: ON HOLD | OUTPATIENT
Start: 2017-10-19 | End: 2018-03-12

## 2017-10-18 RX ORDER — NAPROXEN SODIUM 220 MG/1
81 TABLET, FILM COATED ORAL DAILY
Refills: 0 | Status: ON HOLD | COMMUNITY
Start: 2017-10-19 | End: 2018-03-23 | Stop reason: HOSPADM

## 2017-10-18 RX ORDER — INSULIN ASPART 100 [IU]/ML
20 INJECTION, SOLUTION INTRAVENOUS; SUBCUTANEOUS
Qty: 18 ML | Refills: 3 | Status: ON HOLD | OUTPATIENT
Start: 2017-10-18 | End: 2018-03-12 | Stop reason: HOSPADM

## 2017-10-18 RX ORDER — INSULIN ASPART 100 [IU]/ML
18 INJECTION, SOLUTION INTRAVENOUS; SUBCUTANEOUS
Status: DISCONTINUED | OUTPATIENT
Start: 2017-10-18 | End: 2017-10-18 | Stop reason: HOSPADM

## 2017-10-18 RX ADMIN — PRENATAL VIT W/ FE FUMARATE-FA TAB 27-0.8 MG 1 EACH: 27-0.8 TAB at 08:10

## 2017-10-18 RX ADMIN — ASPIRIN 81 MG CHEWABLE TABLET 81 MG: 81 TABLET CHEWABLE at 08:10

## 2017-10-18 RX ADMIN — INSULIN ASPART 20 UNITS: 100 INJECTION, SOLUTION INTRAVENOUS; SUBCUTANEOUS at 08:10

## 2017-10-18 RX ADMIN — INSULIN ASPART 18 UNITS: 100 INJECTION, SOLUTION INTRAVENOUS; SUBCUTANEOUS at 02:10

## 2017-10-18 NOTE — PROGRESS NOTES
[facility]  Obstetrics  Antepartum Progress Note    Patient Name: Jimena Hazel  MRN: [id number]  Admission Date: 10/16/2017  Hospital Length of Stay: 2 days  Attending Physician: Nidhi Miller MD  Primary Care Provider: Levi Doyle MD    Subjective:     Principal Problem:Type 1 diabetes mellitus with hyperglycemia    HPI:  Jimena Hazel is a 36 y.o. [id number] at 14w5d presenting from [facility] clinic for management of blood sugars.  Patient has a history of type I DM diagnosed 3 years ago. She currently takes levemir 18 QHS and aspart 14 w/ meals (recently increased from 10 per endocrinologist).  She does a sliding scale if needed which she states is most meals.  She states her blood sugars are as high as 260-300 with intermittent low blood sugars as low as 30-50.   Patient currently feels well. She denies nausea or vomiting. She denies abdominal pain, cramping, or vaginal bleeding.   She states she has no other medical problems, she does not take any other medications. She has had two prior C/S in previous pregnancies.       Hospital Course:  10/16/2017- Patient admitted from [facility] clinic. Workup for DKA negative on admission. Started on home insulin regimen of levemir qHS and aspart TID with meals. Plan for glucose patterning and dose adjustments accordingly.  10/17/2017-  Blood sugars elevated at 2AM to 300s, given additional sliding scale. Monitor blood glucose fasting, 2 hour PP, and 2AM. Continuing to adjust insulin dosing.  10/18/2017- Continuing to adjust insulin regimen. Patient seen by diabetic education yesterday.     Obstetric HPI:  Patient doing well this AM. She was seen by diabetic education yesterday, states she learned a lot. She has appointment with dietician on Monday at [facility]. She is otherwise doing well. Denies vaginal bleeding, denies cramping.       Objective:     Vital Signs (Most Recent):  Temp: 96.3 °F (35.7 °C) (10/18/17 020)  Pulse: 73 (10/18/17 0205)  Resp: 16  (10/17/17 1636)  BP: (!) 96/56 (10/18/17 0205)  SpO2: 100 % (10/17/17 2009) Vital Signs (24h Range):  Temp:  [96.3 °F (35.7 °C)-97.9 °F (36.6 °C)] 96.3 °F (35.7 °C)  Pulse:  [73-95] 73  Resp:  [16] 16  SpO2:  [100 %] 100 %  BP: ()/(56-64) 96/56     Weight: 78.9 kg (174 lb)  Body mass index is 28.96 kg/m².    FHT:  148 bpm      Intake/Output Summary (Last 24 hours) at 10/18/17 0620  Last data filed at 10/17/17 1758   Gross per 24 hour   Intake              600 ml   Output              720 ml   Net             -120 ml       Cervical Exam:  Deferred     Significant Labs:  Recent Lab Results       10/18/17  0215 10/17/17  2251 10/17/17  1919 10/17/17  1350 10/17/17  1137      POCT Glucose 175(H) 170(H) 198(H) 151(H) 142(H)                 10/17/17  0859      POCT Glucose 239(H)           Physical Exam:   Constitutional: She is oriented to person, place, and time. She appears well-developed and well-nourished.     Eyes: EOM are normal.    Neck: Normal range of motion.    Cardiovascular: Normal rate.     Pulmonary/Chest: Effort normal. No respiratory distress. She has no wheezes.        Abdominal: Soft. She exhibits no distension. There is no tenderness. There is no rebound and no guarding.             Musculoskeletal: Normal range of motion.       Neurological: She is alert and oriented to person, place, and time.    Skin: Skin is warm and dry.    Psychiatric: She has a normal mood and affect. Her behavior is normal. Judgment and thought content normal.       Assessment/Plan:     36 y.o. female  at 15w0d for:    * Type 1 diabetes mellitus with hyperglycemia    - Workup for DKA negative   ABG pH 7.456 CO2 31.2 HCO3 22.0, UA tr ketones/tr protein, 12.1/34.8/210, bHB 0.1  BMP wnl xNa 135  - Random glucose 91, reassuring  - Urine culture pending  - Last HbA1c  : 7.6  - Home regimen levemir 18 Qhs, Novolog 14 WM                   F                B                         L                        D                  2AM  10/16                                                                           190     326(5u)>201  10/17      142      239(20u)/142        151(14u)/238   198(16u)/170       175  10/18      12 2    - Increased levemir to 22 QHS  - Aspart now dosed as 20/18/16  - Continue to adjust insulin medications as needed  - Cont ASA 81 mg daily  - Diabetic education consult 10/17. Patient states she has appointment with dietician Monday at [facility].         14 weeks gestation of pregnancy    - FHTs qshift              Pati Briones MD  Obstetrics  [facility]

## 2017-10-18 NOTE — DISCHARGE SUMMARY
[facility]  Obstetrics  Discharge Summary      Patient Name: Jimena Hazel  MRN: [id number]  Admission Date: 10/16/2017  Hospital Length of Stay: 2 days  Discharge Date and Time:  10/18/2017 4:34 PM  Attending Physician: Nidhi Miller MD   Discharging Provider: Pati Briones MD  Primary Care Provider: Levi Doyle MD    HPI: Jimena Hazel is a 36 y.o. [id number] at 14w5d presenting from [facility] clinic for management of blood sugars.  Patient has a history of type I DM diagnosed 3 years ago. She currently takes levemir 18 QHS and aspart 14 w/ meals (recently increased from 10 per endocrinologist).  She does a sliding scale if needed which she states is most meals.  She states her blood sugars are as high as 260-300 with intermittent low blood sugars as low as 30-50.   Patient currently feels well. She denies nausea or vomiting. She denies abdominal pain, cramping, or vaginal bleeding.   She states she has no other medical problems, she does not take any other medications. She has had two prior C/S in previous pregnancies.       * No surgery found *     Hospital Course:   10/16/2017- Patient admitted from [facility] clinic. Workup for DKA negative on admission. Started on home insulin regimen of levemir qHS and aspart TID with meals. Plan for glucose patterning and dose adjustments accordingly.  10/17/2017-  Blood sugars elevated at 2AM to 300s, given additional sliding scale. Monitor blood glucose fasting, 2 hour PP, and 2AM. Continuing to adjust insulin dosing.  10/18/2017- Continuing to adjust insulin regimen. Patient seen by diabetic education yesterday.                       F                     B                         L                        D                 2AM  10/16                                                                                  190           326 (5u)>201  10/17      142         239(20u)/142        151(14u)/238   198(16u)/170       175  10/18      122          159(20u)/134        120(18u)/223    Patient is stable for discharge. She is to go home on regiment Aspart 20 with breakfast, 22 with lunch, 18 with dinner, and Levemir 26 QHS.   Patient plans to take blood sugar pre/post meals and fasting.  She plans to follow up with Dr. Henry in clinic in one week with blood glucose log.       Consults         Status Ordering Provider     Inpatient consult to Diabetes educator  Once     Provider:  (Not yet assigned)    MAYO Vilchis          Final Active Diagnoses:    Diagnosis Date Noted POA    PRINCIPAL PROBLEM:  Type 1 diabetes mellitus with hyperglycemia [E10.65] 01/11/2016 Yes    Type 1 diabetes mellitus complicating pregnancy in second trimester, antepartum [O24.012]  Unknown    Blood glucose elevated [R73.9] 10/16/2017 Yes    14 weeks gestation of pregnancy [Z3A.14] 10/09/2017 Not Applicable      Problems Resolved During this Admission:    Diagnosis Date Noted Date Resolved POA        Labs:   CBC   Recent Labs  Lab 10/16/17  1714   WBC 6.77   HGB 12.1   HCT 34.8*          Recent Labs  Lab 10/16/17  1714   *   K 3.8      CO2 23   BUN 6   CREATININE 0.6   GLU 88      bHB 0.1  ABG WNL      Immunizations     None          This patient has no babies on file.  Pending Diagnostic Studies:     None          Discharged Condition: good    Disposition: Home or Self Care    Follow Up:  Follow-up Information     [facility] In 1 week.    Specialty:  Maternal and Fetal Medicine  Why:  Blood sugar follow up  Contact information:  [address] [facility] [address] [phone]  Additional information:  4th floor               Patient Instructions:     Diet general     Activity as tolerated     Call MD for:  increased confusion or weakness     Call MD for:  persistent dizziness, light-headedness, or visual disturbances     Call MD for:  difficulty breathing or increased cough     Call MD for:  redness, tenderness,  or signs of infection (pain, swelling, redness, odor or green/yellow discharge around incision site)     Call MD for:  persistent nausea and vomiting or diarrhea     Call MD for:  temperature >100.4     Call MD for:  severe uncontrolled pain     Call MD for:  severe persistent headache     Call MD for:  worsening rash       Medications:  Current Discharge Medication List      START taking these medications    Details   aspirin 81 MG Chew Take 1 tablet (81 mg total) by mouth once daily.  Refills: 0         CONTINUE these medications which have CHANGED    Details   !! insulin aspart (NOVOLOG) 100 unit/mL InPn pen Take 20 with breakfast, 22 with lunch, and 18 with dinner.  Qty: 2 Box, Refills: 6    Associated Diagnoses: Uncontrolled type 1 diabetes mellitus with hyperglycemia      !! insulin aspart (NOVOLOG) 100 unit/mL InPn pen Inject 20 Units into the skin daily with breakfast.  Qty: 18 mL, Refills: 3      !! insulin aspart (NOVOLOG) 100 unit/mL InPn pen Inject 22 Units into the skin with lunch.  Qty: 19.8 mL, Refills: 3      !! insulin aspart (NOVOLOG) 100 unit/mL InPn pen Inject 18 Units into the skin before dinner.  Qty: 16.2 mL, Refills: 3      insulin detemir (LEVEMIR FLEXTOUCH) 100 unit/mL (3 mL) SubQ InPn pen Inject 26 Units into the skin every evening.  Qty: 1 Box, Refills: 3       !! - Potential duplicate medications found. Please discuss with provider.      CONTINUE these medications which have NOT CHANGED    Details   acetaminophen (TYLENOL) 325 MG tablet Take 500 mg by mouth every 6 (six) hours as needed for Pain.      blood sugar diagnostic Strp Check glucose 8x/day  Qty: 300 strip, Refills: 11    Associated Diagnoses: Uncontrolled type 1 diabetes mellitus with hyperglycemia      glucagon (human recombinant) inj 1mg/mL kit Follow package directions for low blood sugar.  Qty: 1 kit, Refills: 1    Associated Diagnoses: Uncontrolled type 1 diabetes mellitus without complication      pen needle, diabetic (BD  "INSULIN PEN NEEDLE UF MINI) 31 gauge x 3/16" Ndle To use 5-6x/day with insulin injections.  Qty: 200 each, Refills: 3    Associated Diagnoses: Uncontrolled type 1 diabetes mellitus with hyperglycemia             Pati Briones MD  Obstetrics  [facility]  "

## 2017-10-18 NOTE — SUBJECTIVE & OBJECTIVE
Obstetric HPI:  Patient doing well this AM. She was seen by diabetic education yesterday, states she learned a lot. She has appointment with dietician on Monday at main [place]. She is otherwise doing well. Denies vaginal bleeding, denies cramping.       Objective:     Vital Signs (Most Recent):  Temp: 96.3 °F (35.7 °C) (10/18/17 0205)  Pulse: 73 (10/18/17 0205)  Resp: 16 (10/17/17 1636)  BP: (!) 96/56 (10/18/17 0205)  SpO2: 100 % (10/17/17 2009) Vital Signs (24h Range):  Temp:  [96.3 °F (35.7 °C)-97.9 °F (36.6 °C)] 96.3 °F (35.7 °C)  Pulse:  [73-95] 73  Resp:  [16] 16  SpO2:  [100 %] 100 %  BP: ()/(56-64) 96/56     Weight: 78.9 kg (174 lb)  Body mass index is 28.96 kg/m².    FHT:  148 bpm      Intake/Output Summary (Last 24 hours) at 10/18/17 0620  Last data filed at 10/17/17 1758   Gross per 24 hour   Intake              600 ml   Output              720 ml   Net             -120 ml       Cervical Exam:  Deferred     Significant Labs:  Recent Lab Results       10/18/17  0215 10/17/17  2251 10/17/17  1919 10/17/17  1350 10/17/17  1137      POCT Glucose 175(H) 170(H) 198(H) 151(H) 142(H)                 10/17/17  0859      POCT Glucose 239(H)           Physical Exam:   Constitutional: She is oriented to person, place, and time. She appears well-developed and well-nourished.     Eyes: EOM are normal.    Neck: Normal range of motion.    Cardiovascular: Normal rate.     Pulmonary/Chest: Effort normal. No respiratory distress. She has no wheezes.        Abdominal: Soft. She exhibits no distension. There is no tenderness. There is no rebound and no guarding.             Musculoskeletal: Normal range of motion.       Neurological: She is alert and oriented to person, place, and time.    Skin: Skin is warm and dry.    Psychiatric: She has a normal mood and affect. Her behavior is normal. Judgment and thought content normal.

## 2017-10-18 NOTE — ASSESSMENT & PLAN NOTE
- Workup for DKA negative   ABG pH 7.456 CO2 31.2 HCO3 22.0, UA tr ketones/tr protein, 12.1/34.8/210, bHB 0.1  BMP wnl xNa 135  - Random glucose 91, reassuring  - Urine culture pending  - Last HbA1c 9/18 : 7.6  - Home regimen levemir 18 Qhs, Novolog 14 WM                   F                B                         L                        D                 2AM  10/16                                                                           190     326(5u)>201  10/17      142      239(20u)/142        151(14u)/238   198(16u)/170       175  10/18      122    - Increased levemir to 22 QHS  - Aspart now dosed as 20/18/16  - Continue to adjust insulin medications as needed  - Cont ASA 81 mg daily  - Diabetic education consult 10/17. Patient states she has appointment with dietician Monday at [facility].

## 2017-10-19 ENCOUNTER — TELEPHONE (OUTPATIENT)
Dept: MATERNAL FETAL MEDICINE | Facility: CLINIC | Age: 37
End: 2017-10-19

## 2017-10-19 NOTE — TELEPHONE ENCOUNTER
Left patient a message to return my call at [phone].    ----- Message from Pati Briones MD sent at 10/18/2017  4:32 PM CDT -----  Dr. Henry and staff,     Please schedule this patient for follow up with  for diabetes management.  She is being discharged today, and he would like her to be seen in 1 week.     Please let me know if I can be of any assistance in scheduling her appointment.     Thank you so much!  Pati Briones

## 2017-10-26 ENCOUNTER — INITIAL CONSULT (OUTPATIENT)
Dept: MATERNAL FETAL MEDICINE | Facility: CLINIC | Age: 37
End: 2017-10-26
Payer: COMMERCIAL

## 2017-10-26 VITALS
SYSTOLIC BLOOD PRESSURE: 100 MMHG | BODY MASS INDEX: 29.31 KG/M2 | WEIGHT: 176.13 LBS | DIASTOLIC BLOOD PRESSURE: 76 MMHG

## 2017-10-26 DIAGNOSIS — O24.012 TYPE 1 DIABETES MELLITUS AFFECTING PREGNANCY IN SECOND TRIMESTER, ANTEPARTUM: Primary | ICD-10-CM

## 2017-10-26 PROCEDURE — 99213 OFFICE O/P EST LOW 20 MIN: CPT | Mod: S$GLB,,, | Performed by: OBSTETRICS & GYNECOLOGY

## 2017-10-26 PROCEDURE — 99999 PR PBB SHADOW E&M-EST. PATIENT-LVL III: CPT | Mod: PBBFAC,,, | Performed by: OBSTETRICS & GYNECOLOGY

## 2017-10-26 NOTE — LETTER
October 30, 2017      Solange Chen MD  [address]  [address]  [place] [facility] [address]           [facility]  [address]  [place] [facility] [address]  Phone: [phone]          Patient: Jimena Hazel   MR Number: [id number]   YOB: 1980   Date of Visit: 10/26/2017       Dear Dr. Solange Chen:    Thank you for referring Jimena Hazel to me for evaluation. Attached you will find relevant portions of my assessment and plan of care.    If you have questions, please do not hesitate to call me. I look forward to following Jimena Hazel along with you.    Sincerely,    Blair Henry MD    Enclosure  CC:  No Recipients    If you would like to receive this communication electronically, please contact [email or web address] or [phone] to request more information on [facility] Link access.    For providers and/or their staff who would like to refer a patient to [facility], please contact us through our one-stop-shop provider referral line, [facility], at [phone].    If you feel you have received this communication in error or would no longer like to receive these types of communications, please e-mail [email or web address]

## 2017-10-28 NOTE — PROGRESS NOTES
MFM Follow-up    Returns today for follow-up after recent hospitalization for glycemic control related to insulin dependent diabetes.  Baseline Cr 0.8 mg/dL; P/C TLTC; A1C 7.6%    No problems since discharge. Has met with diabetic educator. Feels better able to control and modify diet, carb count.  No cramping, abdominal pain. FHT 150s /76  Blood glucose log reviewed. Overall improved but still suboptimal. Reviewed goals of therapy again. Current regimen: Novolog  with Levemir 26 hs    Having some mid-day/afternoon episodes of relative hypoglycemia, later in the day BG elevated.    Plan:  1. Will adjust regimen to Novolog // and Levemir 30 at hs. May need to consider bid Levemir if still not optimal.   2. Targeted scan at 18-20 weeks scheduled for   3. Echo at 22-24 weeks  4. Recommend low dose ASA  5.  testing at 32 weeks  6. Delivery at 38-39 weeks depending on control    Face to face time 15 min, >50% counseling/care coordination.    Blair Henry Jr., MD  Maternal Fetal Medicine

## 2017-11-07 ENCOUNTER — OFFICE VISIT (OUTPATIENT)
Dept: MATERNAL FETAL MEDICINE | Facility: CLINIC | Age: 37
End: 2017-11-07
Payer: COMMERCIAL

## 2017-11-07 VITALS
DIASTOLIC BLOOD PRESSURE: 70 MMHG | WEIGHT: 177.94 LBS | SYSTOLIC BLOOD PRESSURE: 120 MMHG | BODY MASS INDEX: 29.65 KG/M2 | HEIGHT: 65 IN

## 2017-11-07 DIAGNOSIS — Z36.89 ENCOUNTER FOR FETAL ANATOMIC SURVEY: ICD-10-CM

## 2017-11-07 DIAGNOSIS — O24.012 TYPE 1 DIABETES MELLITUS COMPLICATING PREGNANCY IN SECOND TRIMESTER, ANTEPARTUM: Primary | ICD-10-CM

## 2017-11-07 DIAGNOSIS — O24.012 PRE-EXISTING TYPE 1 DIABETES MELLITUS DURING PREGNANCY IN SECOND TRIMESTER: Primary | ICD-10-CM

## 2017-11-07 PROCEDURE — 99214 OFFICE O/P EST MOD 30 MIN: CPT | Mod: 25,S$GLB,, | Performed by: OBSTETRICS & GYNECOLOGY

## 2017-11-07 PROCEDURE — 76811 OB US DETAILED SNGL FETUS: CPT | Mod: S$GLB,,, | Performed by: OBSTETRICS & GYNECOLOGY

## 2017-11-07 PROCEDURE — 99999 PR PBB SHADOW E&M-EST. PATIENT-LVL III: CPT | Mod: PBBFAC,,, | Performed by: OBSTETRICS & GYNECOLOGY

## 2017-11-07 NOTE — LETTER
November 8, 2017      Nidhi Miller MD  [address]  [address]  [place] [facility] [address]           [facility]  [address]  [place] [facility] [address]  Phone: [phone]          Patient: Jimena Hazel   MR Number: [id number]   YOB: 1980   Date of Visit: 11/7/2017       Dear Dr. Nidhi Miller:    Thank you for referring Jimena Hazel to me for evaluation. Attached you will find relevant portions of my assessment and plan of care.    If you have questions, please do not hesitate to call me. I look forward to following Jimena Hazel along with you.    Sincerely,    Blair Henry MD    Enclosure  CC:  No Recipients    If you would like to receive this communication electronically, please contact [email or web address] or [phone] to request more information on [facility] Link access.    For providers and/or their staff who would like to refer a patient to [facility], please contact us through our one-stop-shop provider referral line, [facility], at [phone].    If you feel you have received this communication in error or would no longer like to receive these types of communications, please e-mail [email or web address]

## 2017-11-07 NOTE — NURSING
Insulin changes per Dr. Henry - new Rx to pharmacy.    Pt verbalized understanding of information.

## 2017-11-08 NOTE — PROGRESS NOTES
Indication  ========    Consultation. Fetal anatomy survey. BS Check.    History  ======    Risk Factors  History risk factors: Diabetes mellitus  Details: Type 1    Pregnancy History  ==============    Maternal Lab Tests  Genetic screening declined.      Maternal Assessment  =================    Weight 81 kg  Weight (lb) 179 lb  BP syst 120 mmHg  BP diast 70 mmHg    Method  ======    Transabdominal ultrasound examination.    Pregnancy  =========    Alston pregnancy. Number of fetuses: 1.    Dating  ======    Cycle: regular cycle  Ultrasound examination on: 11/7/2017  GA by U/S based upon: AC, BPD, Femur, HC  GA by U/S 18 w + 3 d  ZEKE by U/S: 4/7/2018  Assigned: Dating performed on 08/31/2017, based on ultrasound (CRL)  Assigned GA 17 w + 6 d  Assigned ZEKE: 4/11/2018    General Evaluation  ==============    Cardiac activity: present.  bpm.  Fetal movements: visualized.  Presentation: transverse.  Placenta: posterior.  Umbilical cord: 3 vessel cord, normal.  Amniotic fluid: normal amount.    Fetal Biometry  ============    Fetal Biometry  BPD 41.7 mm 18w 4d Hadlock  OFD 53.9 mm 19w 2d Kelly  .1 mm 18w 4d Hadlock  .8 mm 18w 0d Hadlock  Femur 27.3 mm 18w 2d Hadlock  Cerebellum tr 18.8 mm 19w 0d Funes  CM 2.9 mm  Nuchal fold 2.49 mm  Humerus 27.5 mm 18w 5d Kelly   g  Calculated by: Hadlock (BPD-HC-AC-FL)  EFW (lb) 0 lb  EFW (oz) 8 oz  Cephalic index 0.77  HC / AC 1.26  FL / BPD 0.65  FL / AC 0.22   bpm  Head / Face / Neck   6.5 mm  Nasal bone 6.4 mm    Fetal Anatomy  ===========    Cranium: normal  Lateral ventricles: normal  Choroid plexus: normal  Midline falx: normal  Cavum septi pellucidi: normal  Cerebellum: normal  Cisterna magna: normal  Head shape: normal  Rt lateral ventricle: normal  Lt lateral ventricle: normal  Rt choroid plexus: normal  Lt choroid plexus: normal  Parenchyma: normal  Third ventricle: normal  Posterior fossa: normal  Cerebellar  lobes: normal  Vermis: normal  Neck: appears normal  Nuchal fold: normal  Lips: normal  Profile: normal  Nose: normal  Maxilla: normal  Mandible: normal  4-chamber view: normal  RVOT: suboptimal  LVOT: suboptimal  Heart / Thorax: Septal views: Suboptimal  Situs: normal  Aortic arch: normal  Ductal arch: normal  SVC: normal  IVC: normal  3-vessel view: normal  3-vessel-trachea view: normal  Cardiac position: normal  Cardiac axis: normal  Cardiac size: normal  Cardiac rhythm: normal  Rt lung: normal  Lt lung: normal  Diaphragm: normal  Cord insertion: normal  Stomach: normal  Kidneys: normal  Bladder: normal  Abdom. wall: appears normal  Abdom. cavity: normal  Rt kidney: normal  Lt kidney: normal  Liver: normal  Bowel: normal  Cervical spine: normal  Thoracic spine: normal  Lumbar spine: normal  Sacral spine: normal  Arms: normal  Legs: normal  Rt arm: normal  Lt arm: normal  Rt hand: present  Lt hand: present  Rt leg: normal  Lt leg: normal  Rt foot: normal  Lt foot: normal  Position of hands: normal  Position of feet: normal  Gender: male  Wants to know gender: yes    Maternal Structures  ===============    Uterus / Cervix  Uterus: Normal  Cervical length 40.2 mm  Ovaries / Tubes / Adnexa  Rt ovary: Visualized  Lt ovary: Visualized  Other: Uterus and adnexa normal    Consultation  ==========    Returns for follow-up for blood glucose check. Currently on Levemir 30 U qhs; Novolog 20/18/20  Reports overall compliance with diet, but not as good on weekends.  Blood glucose log brought in and reviewed. Glycemic control has improved over last two weeks since making some adjustments but still  suboptimal, but no distinct pattern to timing of elevated BG or low BG.    Discussed with patient and  importance of better glycemic control. We discussed as well the importance of a regular schedule for  eating and insulin and reviewed again our goals for therapy.    Given the variance in her schedule and glycemic control,  will try to shift to provide some more basal insulin with less mealtime bolus.    Regimen changed to : LEVEMIR 14 UNITS in AM and 30 UNITS in PM; NOVOLG 16 U breakfast, 16 U lunch, 18 U dinner as she reports this  is typically her largest meal.    To send BG to me for review beginning of next week and then return office visit in 2 weeks.    I overall spent approximately 30 minutes in face to face time with the patient and her family, greater than 50% of which was in counseling and  care coordination.      Impression  =========    A detailed fetal anatomic ultrasound examination was performed for the following high risk indication: pregestational diabetes. No fetal  structural malformations are identified; however, fetal imaging is incomplete today. A follow-up study will be scheduled to complete the fetal  anatomic survey. Fetal size today is consistent with established gestational age. Cervical length is normal. Placental location is normal without  evidence of previa.    Suboptimal glycemic control.    Recommendation  ==============    1. Fetal echocardiogram at 22-24 weeks  2. Insulin adjustments made as above; patient [facility] review next week  3. Return for blood glucose check in 2 weeks  4. Repeat ultrasound for growth in 4 weeks and completion of anatomy.

## 2017-11-20 ENCOUNTER — ROUTINE PRENATAL (OUTPATIENT)
Dept: MATERNAL FETAL MEDICINE | Facility: CLINIC | Age: 37
End: 2017-11-20
Payer: COMMERCIAL

## 2017-11-20 ENCOUNTER — LAB VISIT (OUTPATIENT)
Dept: LAB | Facility: OTHER | Age: 37
End: 2017-11-20
Attending: NURSE PRACTITIONER
Payer: COMMERCIAL

## 2017-11-20 VITALS
WEIGHT: 182.56 LBS | BODY MASS INDEX: 30.38 KG/M2 | SYSTOLIC BLOOD PRESSURE: 112 MMHG | DIASTOLIC BLOOD PRESSURE: 76 MMHG

## 2017-11-20 DIAGNOSIS — O24.112 PRE-EXISTING TYPE 2 DIABETES MELLITUS DURING PREGNANCY IN SECOND TRIMESTER: Primary | ICD-10-CM

## 2017-11-20 DIAGNOSIS — E10.65 UNCONTROLLED TYPE 1 DIABETES MELLITUS WITH HYPERGLYCEMIA: ICD-10-CM

## 2017-11-20 LAB
ALBUMIN SERPL BCP-MCNC: 3.1 G/DL
ALP SERPL-CCNC: 56 U/L
ALT SERPL W/O P-5'-P-CCNC: 12 U/L
ANION GAP SERPL CALC-SCNC: 9 MMOL/L
AST SERPL-CCNC: 14 U/L
BILIRUB SERPL-MCNC: 0.3 MG/DL
BUN SERPL-MCNC: 8 MG/DL
C PEPTIDE SERPL-MCNC: <0.08 NG/ML
CALCIUM SERPL-MCNC: 9.8 MG/DL
CHLORIDE SERPL-SCNC: 103 MMOL/L
CO2 SERPL-SCNC: 24 MMOL/L
CREAT SERPL-MCNC: 0.7 MG/DL
EST. GFR  (AFRICAN AMERICAN): >60 ML/MIN/1.73 M^2
EST. GFR  (NON AFRICAN AMERICAN): >60 ML/MIN/1.73 M^2
ESTIMATED AVG GLUCOSE: 143 MG/DL
GLUCOSE SERPL-MCNC: 57 MG/DL
HBA1C MFR BLD HPLC: 6.6 %
POTASSIUM SERPL-SCNC: 3.4 MMOL/L
PROT SERPL-MCNC: 7.8 G/DL
SODIUM SERPL-SCNC: 136 MMOL/L

## 2017-11-20 PROCEDURE — 83036 HEMOGLOBIN GLYCOSYLATED A1C: CPT

## 2017-11-20 PROCEDURE — 80053 COMPREHEN METABOLIC PANEL: CPT

## 2017-11-20 PROCEDURE — 36415 COLL VENOUS BLD VENIPUNCTURE: CPT

## 2017-11-20 PROCEDURE — 99214 OFFICE O/P EST MOD 30 MIN: CPT | Mod: S$GLB,,, | Performed by: OBSTETRICS & GYNECOLOGY

## 2017-11-20 PROCEDURE — 99999 PR PBB SHADOW E&M-EST. PATIENT-LVL II: CPT | Mod: PBBFAC,,, | Performed by: OBSTETRICS & GYNECOLOGY

## 2017-11-20 PROCEDURE — 82985 ASSAY OF GLYCATED PROTEIN: CPT

## 2017-11-20 PROCEDURE — 84681 ASSAY OF C-PEPTIDE: CPT

## 2017-11-20 NOTE — PROGRESS NOTES
[facility] clinic follow-up    Returns today for a blood glucose check.  She brought a log sheet but most days she is only checking her blood sugar once or twice a day and is doing so at somewhat random times.  Her regimen was adjusted after her last visit to Levemir 14 units in the morning and 30 units at bedtime with NovoLog 16 units with breakfast 16 units with lunch and 18 units at dinner.  With the data on the log that she brought it looks like her fastings are still somewhat elevated and her dinner postprandials are elevated.  It may be that she will need adjustment other times as well, but I explained to her and her partner again the importance of adhering to a regular regimen and schedule for eating and checking the blood glucoses.  I explained to them how the timing of the blood glucose checks effects what we do with the insulin dosing.    I instructed her to increase her Levemir in the evening to 32 units into increase her dinner NPH to 22 units.  I instructed her and gave her new log sheet.  I explained that if her blood sugars are not markedly improved when she returns next week for reevaluation then we will need to consider admitting her to the hospital.  I discussed the importance of tight glycemic control during pregnancy even after the period of organogenesis.  Fetal heart tones were checked today and they were in the 140s.  Her blood pressure was 112/76.    I overall spent approximately 25 minutes in face-to-face time with her greater than 50% of which were spent in counseling and care coordination.    Her new regimen is Levemir 14 units in the morning and 32 units at bedtime; NovoLog 16 units with breakfast, 16 units with lunch, and 22 units at dinner.    She has a follow-up appointment to complete her anatomic survey and assess fetal growth on December 11.  We have scheduled a follow-up blood sugar check for approximately 10 days.

## 2017-11-21 ENCOUNTER — TELEPHONE (OUTPATIENT)
Dept: ENDOCRINOLOGY | Facility: CLINIC | Age: 37
End: 2017-11-21

## 2017-11-23 LAB — FRUCTOSAMINE SERPL-SCNC: 321 UMOL /L (ref 0–285)

## 2017-12-01 ENCOUNTER — TELEPHONE (OUTPATIENT)
Dept: MATERNAL FETAL MEDICINE | Facility: CLINIC | Age: 37
End: 2017-12-01

## 2017-12-01 NOTE — TELEPHONE ENCOUNTER
Pt canceled today's appointment, discussed with Dr. Henry and patient to send BS log for him to review.    Message left for pt to call [facility] clinic at [phone].

## 2017-12-04 ENCOUNTER — TELEPHONE (OUTPATIENT)
Dept: OBSTETRICS AND GYNECOLOGY | Facility: CLINIC | Age: 37
End: 2017-12-04

## 2017-12-04 NOTE — TELEPHONE ENCOUNTER
----- Message from Agnes Tubbs sent at 12/4/2017  8:04 AM CST -----  Contact: [facility]  Jimena Morel cancelled her [facility] appt on 12/01/2017. sh

## 2017-12-07 ENCOUNTER — OFFICE VISIT (OUTPATIENT)
Dept: OBSTETRICS AND GYNECOLOGY | Facility: CLINIC | Age: 37
End: 2017-12-07
Payer: COMMERCIAL

## 2017-12-07 VITALS
DIASTOLIC BLOOD PRESSURE: 62 MMHG | SYSTOLIC BLOOD PRESSURE: 110 MMHG | BODY MASS INDEX: 31.81 KG/M2 | HEIGHT: 65 IN | WEIGHT: 190.94 LBS

## 2017-12-07 DIAGNOSIS — Z3A.22 22 WEEKS GESTATION OF PREGNANCY: Primary | ICD-10-CM

## 2017-12-07 PROCEDURE — 0502F SUBSEQUENT PRENATAL CARE: CPT | Mod: S$GLB,,, | Performed by: NURSE PRACTITIONER

## 2017-12-07 PROCEDURE — 99999 PR PBB SHADOW E&M-EST. PATIENT-LVL III: CPT | Mod: PBBFAC,,, | Performed by: NURSE PRACTITIONER

## 2017-12-07 NOTE — PROGRESS NOTES
OB at 22w1d presents to urgent care with c/o RLP. States it is not a cramping feeling but just a discomfort in her lower abdomen/groin area. It is random in pattern. Not painful. She has not tried Tylenol. Admits to only having 1 cup of water today so far. Denies VB and LOF. + FHTs with doppler- 150. Reports + FM. Discussed comfort measures-increased water intake, tylenol prn, and hot packs. Pain, bleeding, and PTL precautions given. She is followed by MFM for her uncontrolled BS.-F/U scheduled 1 week

## 2017-12-11 ENCOUNTER — OFFICE VISIT (OUTPATIENT)
Dept: MATERNAL FETAL MEDICINE | Facility: CLINIC | Age: 37
End: 2017-12-11
Payer: COMMERCIAL

## 2017-12-11 VITALS
WEIGHT: 189.63 LBS | BODY MASS INDEX: 31.55 KG/M2 | SYSTOLIC BLOOD PRESSURE: 104 MMHG | DIASTOLIC BLOOD PRESSURE: 66 MMHG

## 2017-12-11 DIAGNOSIS — O24.012 PRE-EXISTING TYPE 1 DIABETES MELLITUS DURING PREGNANCY IN SECOND TRIMESTER: ICD-10-CM

## 2017-12-11 PROCEDURE — 76816 OB US FOLLOW-UP PER FETUS: CPT | Mod: S$GLB,,, | Performed by: OBSTETRICS & GYNECOLOGY

## 2017-12-11 PROCEDURE — 99999 PR PBB SHADOW E&M-EST. PATIENT-LVL III: CPT | Mod: PBBFAC,,, | Performed by: OBSTETRICS & GYNECOLOGY

## 2017-12-11 PROCEDURE — 99214 OFFICE O/P EST MOD 30 MIN: CPT | Mod: 25,S$GLB,, | Performed by: OBSTETRICS & GYNECOLOGY

## 2017-12-11 NOTE — Clinical Note
Nidhi,  Trying to get her controlled, but she is not checking BS much. I told her we could admit her, but she does not want to and I actually dont think it will do much good since as soon as she goes home she will revert. She is supposed to send me logs weekly in portal. If you can just keep driving that message home maybe it will help.  Adama

## 2017-12-18 DIAGNOSIS — O35.9XX0 SUSPECTED FETAL ABNORMALITY AFFECTING MANAGEMENT OF MOTHER, ANTEPARTUM, SINGLE OR UNSPECIFIED FETUS: Primary | ICD-10-CM

## 2017-12-26 ENCOUNTER — INITIAL CONSULT (OUTPATIENT)
Dept: MATERNAL FETAL MEDICINE | Facility: CLINIC | Age: 37
End: 2017-12-26
Payer: COMMERCIAL

## 2017-12-26 ENCOUNTER — OFFICE VISIT (OUTPATIENT)
Dept: PEDIATRIC CARDIOLOGY | Facility: CLINIC | Age: 37
End: 2017-12-26
Payer: COMMERCIAL

## 2017-12-26 ENCOUNTER — CLINICAL SUPPORT (OUTPATIENT)
Dept: PEDIATRIC CARDIOLOGY | Facility: CLINIC | Age: 37
End: 2017-12-26
Payer: COMMERCIAL

## 2017-12-26 VITALS
WEIGHT: 194.69 LBS | HEIGHT: 65 IN | DIASTOLIC BLOOD PRESSURE: 74 MMHG | HEART RATE: 96 BPM | SYSTOLIC BLOOD PRESSURE: 120 MMHG | BODY MASS INDEX: 32.44 KG/M2

## 2017-12-26 VITALS
SYSTOLIC BLOOD PRESSURE: 120 MMHG | DIASTOLIC BLOOD PRESSURE: 74 MMHG | WEIGHT: 194.69 LBS | BODY MASS INDEX: 32.39 KG/M2

## 2017-12-26 DIAGNOSIS — O24.012 TYPE 1 DIABETES MELLITUS COMPLICATING PREGNANCY IN SECOND TRIMESTER, ANTEPARTUM: Primary | ICD-10-CM

## 2017-12-26 DIAGNOSIS — O35.9XX0 SUSPECTED FETAL ABNORMALITY AFFECTING MANAGEMENT OF MOTHER, ANTEPARTUM, SINGLE OR UNSPECIFIED FETUS: ICD-10-CM

## 2017-12-26 DIAGNOSIS — O24.012 TYPE 1 DIABETES MELLITUS COMPLICATING PREGNANCY, ANTEPARTUM, SECOND TRIMESTER: Primary | ICD-10-CM

## 2017-12-26 PROCEDURE — 76825 ECHO EXAM OF FETAL HEART: CPT | Mod: S$GLB,,, | Performed by: PEDIATRICS

## 2017-12-26 PROCEDURE — 76827 ECHO EXAM OF FETAL HEART: CPT | Mod: S$GLB,,, | Performed by: PEDIATRICS

## 2017-12-26 PROCEDURE — 93325 DOPPLER ECHO COLOR FLOW MAPG: CPT | Mod: S$GLB,,, | Performed by: PEDIATRICS

## 2017-12-26 PROCEDURE — 99999 PR PBB SHADOW E&M-EST. PATIENT-LVL III: CPT | Mod: PBBFAC,,, | Performed by: OBSTETRICS & GYNECOLOGY

## 2017-12-26 PROCEDURE — 99999 PR PBB SHADOW E&M-EST. PATIENT-LVL III: CPT | Mod: PBBFAC,,, | Performed by: PEDIATRICS

## 2017-12-26 PROCEDURE — 99215 OFFICE O/P EST HI 40 MIN: CPT | Mod: S$GLB,,, | Performed by: OBSTETRICS & GYNECOLOGY

## 2017-12-26 PROCEDURE — 99203 OFFICE O/P NEW LOW 30 MIN: CPT | Mod: 25,S$GLB,, | Performed by: PEDIATRICS

## 2017-12-26 NOTE — PROGRESS NOTES
[facility] Follow-up    Jimena returned today for follow-up and review of her blood sugars.  She had a fetal echocardiogram earlier in the day which reportedly is normal but follow-up images will be needed in a few weeks.    She brought her blood sugar log in today and it continues to reveal suboptimal glycemic control with elevated fastings as well as postprandials.  I reviewed her compliance and dietary habits at length with her and her .  She has previously met with the diabetic educator and understands in concept which she is supposed to be doing for a diet but it sounds like her food choices are very poor because of pressures on her time due to her work and life demands.  Given her poor glycemic control I recommended admitting her to the hospital for intensive education and regulation of her insulin dosing.  She declined at this point.  I explained again the importance of achieving good glycemic control and its effects on fetal development.    I gave her a copy of a diabetic diet and went over the composition of what a 2400-calorie ADA diet should have.  I explained to the schedule that she should follow for eating meals and snacks.  We also discussed food choices and how to ensure that there is a better balance in her dietary makeup as she has been eating a lot of simple carbohydrates.    I told her to increase her regimen to Levemir 20 units in the morning and 38 units in the evening (up from 14 units and 32 units) and NovoLog 16 units with breakfast, 18 units with lunch, and 24 units with dinner (up from 16/16/22).  She and her  both stated that they wanted to try for an additional week at home because they see the value in trying to get control of things at home and making it work with her schedule as opposed to putting her in the hospital for 2-3 days.  I will see her back next week but at that point if she continues to have suboptimal control, I see no other alternative but to put her in the  hospital and try to adjust her dosing regimen and for intensive education.    I overall spent approximately 45 minutes in face-to-face time with her discussing her diabetic control, her dietary choices, and her insulin regimen.    Blair Henry Jr., MD  Maternal Fetal Medicine

## 2017-12-26 NOTE — LETTER
December 26, 2017      Nidhi Miller MD  [address] [facility]  [address]  [facility] [address]           [facility] - Maternal Fetal Med  [address]  [facility] [address]  Phone: [phone]          Patient: Jimena Hazel   MR Number: [id number]   YOB: 1980   Date of Visit: 12/26/2017       Dear Dr. Nidhi Miller:    Thank you for referring Jimena Hazel to me for evaluation. Attached you will find relevant portions of my assessment and plan of care.    If you have questions, please do not hesitate to call me. I look forward to following Jimena Hazel along with you.    Sincerely,    Blair Henry MD    Enclosure  CC:  No Recipients    If you would like to receive this communication electronically, please contact [email or web address] or [phone] to request more information on [employer] Link access.    For providers and/or their staff who would like to refer a patient to [facility], please contact us through our one-stop-shop provider referral line, [facility], at [phone].    If you feel you have received this communication in error or would no longer like to receive these types of communications, please e-mail [email or web address]

## 2017-12-28 NOTE — PROGRESS NOTES
"[facility] Pediatric Cardiology Fetal Cardiology Clinic    Today, I had the pleasure of evaluating Jimena Hazel who is now 37 y.o. and carrying her third pregnancy at 24 6/7 weeks gestation with an ZEKE of 18. She was referred for evaluation of the fetal heart due maternal history of type I DM. No fetal anatomic abnormalities noted by MFM.       She is carrying a male fetus, named Ajith.  She has felt the baby move.       Obstetric History:    .  Her OB history is otherwise unremarkable.     Past Medical History:   Diagnosis Date    Diabetes mellitus     Diabetes mellitus type I     Irregular heartbeat          Current Outpatient Prescriptions:     blood sugar diagnostic Strp, Check glucose 8x/day, Disp: 300 strip, Rfl: 11    glucagon (human recombinant) inj 1mg/mL kit, Follow package directions for low blood sugar., Disp: 1 kit, Rfl: 1    insulin aspart (NOVOLOG) 100 unit/mL InPn pen, Take 20 with breakfast, 22 with lunch, and 18 with dinner., Disp: 2 Box, Rfl: 6    insulin aspart (NOVOLOG) 100 unit/mL InPn pen, Inject 20 Units into the skin daily with breakfast., Disp: 18 mL, Rfl: 3    insulin aspart (NOVOLOG) 100 unit/mL InPn pen, Inject 22 Units into the skin with lunch., Disp: 19.8 mL, Rfl: 3    insulin aspart (NOVOLOG) 100 unit/mL InPn pen, Inject 18 Units into the skin before dinner., Disp: 16.2 mL, Rfl: 3    insulin detemir (LEVEMIR FLEXTOUCH) 100 unit/mL (3 mL) SubQ InPn pen, Inject 26 Units into the skin every evening. (Patient taking differently: Inject 36 Units into the skin every evening. As of 17: Levemir 14 units in am and 36 units QHS), Disp: 1 Box, Rfl: 3    pen needle, diabetic (BD INSULIN PEN NEEDLE UF MINI) 31 gauge x 3/16" Ndle, To use 5-6x/day with insulin injections., Disp: 200 each, Rfl: 3    acetaminophen (TYLENOL) 325 MG tablet, Take 500 mg by mouth every 6 (six) hours as needed for Pain., Disp: , Rfl:     aspirin 81 MG Chew, Take 1 tablet (81 mg total) by mouth " once daily., Disp: , Rfl: 0     Review of patient's allergies indicates:  No Known Allergies    Family History: Negative for congenital heart disease, early coronary artery disease, sudden unexplained death, connective tissues disorders, genetic syndromes, multiple miscarriages or other congenital anomalies.    Social History: Ms. Jimena Hazel is  to the father of the baby.  The father of the baby is involved.     FETAL ECHOCARDIOGRAM (summary):  Normal fetal echocardiogram.  (Full report in electronic medical record)    Impression:  Single active male fetus at 24 6/7 wga.  Normal fetal echocardiogram.      Todays fetal echocardiogram is normal, within the limitations of fetal echocardiography.  I discussed with her that fetal echocardiography is insufficiently sensitive to rule out all septal defects, anomalies of pulmonary and systemic veins, arch anomalies, and some valvar abnormalities, nor can it ensure that the ductus arteriosus and foramen ovale will spontaneously close.     I reviewed the concerns related to maternal diabetes. With normal fetal anatomy and function at this stage of development, the primary concern is the possibility of developing hypertrophic cardiomyopathy if there is difficulty controlling the maternal glucose and insulin. I carefully reviewed the possibility of this problem in the  period, the problems that it could cause and the high probability that it would regress after the infant is no longer exposed to the maternal glucose and insulin. I also reinforced the need to regulate the insulin and glucose carefully during the remainder of the pregnancy to minimize the risk for this complication.    Recommendations:  I would recommend a repeat fetal echo in the third trimester to evaluate for ventricular hypertrophy (6-8 weeks)    Location, timing, and mode of delivery will be determined by the obstetrical team.    Should there be any concerns about the baby's heart after  birth, a post- echocardiogram and cardiology consultation are recommended.           The above information was discussed in detail including the use of diagrams, 30 minutes were used for the evaluation with half of that time in discussion and counseling.

## 2018-01-02 ENCOUNTER — ROUTINE PRENATAL (OUTPATIENT)
Dept: MATERNAL FETAL MEDICINE | Facility: CLINIC | Age: 38
End: 2018-01-02
Payer: COMMERCIAL

## 2018-01-02 VITALS
WEIGHT: 197.56 LBS | DIASTOLIC BLOOD PRESSURE: 76 MMHG | BODY MASS INDEX: 32.87 KG/M2 | SYSTOLIC BLOOD PRESSURE: 116 MMHG

## 2018-01-02 PROCEDURE — 99999 PR PBB SHADOW E&M-EST. PATIENT-LVL I: CPT | Mod: PBBFAC,,, | Performed by: OBSTETRICS & GYNECOLOGY

## 2018-01-02 PROCEDURE — 99214 OFFICE O/P EST MOD 30 MIN: CPT | Mod: S$GLB,,, | Performed by: OBSTETRICS & GYNECOLOGY

## 2018-01-02 NOTE — PROGRESS NOTES
[facility] Follow-up      Jimena returned today for follow-up.  She brought her blood sugar log and has been much more compliant with checking her blood sugar 4 times a day.  During her last visit I spent a considerable amount of time going over diet and nutritional intake.  I also explained to her that than the importance of her regularity with regards to meal time eating.  She has made an excellent effort over the last week and her blood sugar log is markedly improved.  Her fastings are still elevated but overall her now in the 120 to 1:30 range.  Her post breakfast and post lunch blood sugars over the last few days are excellent.  Her post dinner readings are actually too low.  She and her  report that she has been taking a lot more vegetables and has really cut down on the carbohydrates.    Fetal heart tones are in the 150s.    Her new regimen will be:     Levemir 20 units in the morning and 42 units (up from 38) at bedtime  NovoLog 16 units at breakfast, 18 units at lunch, and 20 units at dinner (down from 24 units)    I encouraged her to continue to check her blood glucose 4 times a day as she has been doing and to continue her efforts that compliance with her diabetic diet.  I explained to her to contact our office if she has any significant hypoglycemic readings and to not wait until next week.  I will see her again in 1 week.    I overall spent approximately 25 minutes in face-to-face time with Jimena and her  greater than 50% of which was spent in counseling and further care coordination time.    Blair Henry Jr., MD  Maternal Fetal Medicine

## 2018-01-09 ENCOUNTER — INITIAL CONSULT (OUTPATIENT)
Dept: MATERNAL FETAL MEDICINE | Facility: CLINIC | Age: 38
End: 2018-01-09
Payer: COMMERCIAL

## 2018-01-09 VITALS
DIASTOLIC BLOOD PRESSURE: 70 MMHG | BODY MASS INDEX: 33.42 KG/M2 | SYSTOLIC BLOOD PRESSURE: 116 MMHG | WEIGHT: 200.81 LBS

## 2018-01-09 DIAGNOSIS — Z36.89 ENCOUNTER FOR ULTRASOUND TO CHECK FETAL GROWTH: Primary | ICD-10-CM

## 2018-01-09 PROCEDURE — 99214 OFFICE O/P EST MOD 30 MIN: CPT | Mod: S$GLB,,, | Performed by: OBSTETRICS & GYNECOLOGY

## 2018-01-09 PROCEDURE — 99999 PR PBB SHADOW E&M-EST. PATIENT-LVL II: CPT | Mod: PBBFAC,,, | Performed by: OBSTETRICS & GYNECOLOGY

## 2018-01-09 NOTE — LETTER
January 9, 2018      Nidhi Miller MD  [address] [facility]  [address]  [facility] [address]           [facility] - Maternal Fetal Med  [address]  [facility] [address]  Phone: [phone]          Patient: Jimena Hazel   MR Number: [id number]   YOB: 1980   Date of Visit: 1/9/2018       Dear Dr. Nidhi Miller:    Thank you for referring Jimena Hazel to me for evaluation. Attached you will find relevant portions of my assessment and plan of care.    If you have questions, please do not hesitate to call me. I look forward to following Jimena Hazel along with you.    Sincerely,    Blair Henry MD    Enclosure  CC:  No Recipients    If you would like to receive this communication electronically, please contact [email or web address] or [phone] to request more information on [employer] Link access.    For providers and/or their staff who would like to refer a patient to [facility], please contact us through our one-stop-shop provider referral line, [facility], at [phone].    If you feel you have received this communication in error or would no longer like to receive these types of communications, please e-mail [email or web address]

## 2018-01-09 NOTE — PROGRESS NOTES
[facility] Follow-Up    Jimena for further follow-up of her diabetes.  She reports that she has markedly improved her dietary compliance and overall feels like she has things under better control.  Her current regimen is Levemir 20 units in the morning and 42 units at bedtime with NovoLog 16 units at breakfast 18 units at lunch and 20 units at dinner.  She brought her blood sugar log in and overall they are markedly improved from where she was 2 weeks ago.  She really for most days only has one blood sugar that is above the target range.  She does have a couple of days where she had some dietary indiscretion that her blood sugars were significantly elevated but for the most part they are improved.    We discussed again the goals and the importance of good glycemic control.  I reviewed again the types of foods that she can and cannot eat.  Given the improvement in her blood glucoses when she is compliant and over the last couple of weeks I'm going to continue her on her current regimen.  She is to send me her blood sugars next week through the portal and I will see her back in 2 weeks for an office visit with an ultrasound for fetal growth at that time.    She and her  also asked questions about the potential for a .  I explained that while it may be reasonable and some patients to consider a  even after 2 cesareans, in her case she is not the ideal candidate because we want to get her delivered at 37 weeks due to her difficult to control diabetes.  I explained that with 2 prior  she is not someone who we would want to induce labor on and that there is a slight increase in the risk of uterine rupture with 2 prior  compared to a single .  I also explained to her that whether somewhat is a candidate for trial of labor after 2 prior cesareans depends on whether there was any type of extension or other complication at the time of their primary surgeries.  I told her for all of these  reasons I think she will be better served by repeat  but certainly this is something that she could discuss further with Dr. Miller.    I overall spent approximately 25 minutes in face-to-face time with her and her  greater than 50% of which were spent in counseling and further care coordination time.    Blair Henry Jr., MD  Maternal Fetal Medicine

## 2018-01-09 NOTE — PROGRESS NOTES
Patient here for blood sugar consultation with Dr. Henry. FHTs verified by MIKE Santos and Concepcion, RNs, 124-132. Dr. Henry aware. Patient states feels baby moving frequently. VSS.

## 2018-01-23 ENCOUNTER — OFFICE VISIT (OUTPATIENT)
Dept: MATERNAL FETAL MEDICINE | Facility: CLINIC | Age: 38
End: 2018-01-23
Payer: COMMERCIAL

## 2018-01-23 VITALS
BODY MASS INDEX: 34.38 KG/M2 | DIASTOLIC BLOOD PRESSURE: 70 MMHG | WEIGHT: 206.56 LBS | SYSTOLIC BLOOD PRESSURE: 110 MMHG

## 2018-01-23 DIAGNOSIS — Z36.89 ENCOUNTER FOR ULTRASOUND TO CHECK FETAL GROWTH: ICD-10-CM

## 2018-01-23 DIAGNOSIS — O24.313 PRE-EXISTING DIABETES MELLITUS AFFECTING PREGNANCY IN THIRD TRIMESTER, ANTEPARTUM: Primary | ICD-10-CM

## 2018-01-23 PROCEDURE — 99213 OFFICE O/P EST LOW 20 MIN: CPT | Mod: 25,S$GLB,, | Performed by: OBSTETRICS & GYNECOLOGY

## 2018-01-23 PROCEDURE — 76816 OB US FOLLOW-UP PER FETUS: CPT | Mod: S$GLB,,, | Performed by: OBSTETRICS & GYNECOLOGY

## 2018-01-23 PROCEDURE — 99999 PR PBB SHADOW E&M-EST. PATIENT-LVL III: CPT | Mod: PBBFAC,,, | Performed by: OBSTETRICS & GYNECOLOGY

## 2018-01-23 NOTE — PROGRESS NOTES
Indication  ========    F/U Consultation. Evaluation of fetal growth. BS Check.    History  ======    Risk Factors  History risk factors: Diabetes mellitus  Details: Type 1    Pregnancy History  ==============    Maternal Lab Tests  Genetic screening declined.      Maternal Assessment  =================    Weight 94 kg  Weight (lb) 207 lb  BP syst 110 mmHg  BP diast 70 mmHg    Method  ======    Transabdominal ultrasound examination.    Pregnancy  =========    Alston pregnancy. Number of fetuses: 1.    Dating  ======    Cycle: regular cycle  Ultrasound examination on: 1/23/2018  GA by U/S based upon: AC, BPD, Femur, HC  GA by U/S 29 w + 6 d  ZEKE by U/S: 4/4/2018  Assigned: Dating performed on 08/31/2017, based on ultrasound (CRL)  Assigned GA 28 w + 6 d  Assigned ZEKE: 4/11/2018    General Evaluation  ==============    Cardiac activity: present.  bpm.  Fetal movements: visualized.  Presentation: cephalic.  Placenta: posterior.  Amniotic fluid: MVP 4.8 cm.    Fetal Biometry  ============    Fetal Biometry  BPD 73.7 mm 29w 4d Hadlock  .5 mm 32w 4d Kelly  .7 mm 30w 5d Hadlock  .1 mm 28w 3d Hadlock  Femur 58.1 mm 30w 3d Hadlock  EFW 1,389 g 51% Buck  Calculated by: Hadlock (BPD-HC-AC-FL)  EFW (lb) 3 lb  EFW (oz) 1 oz  Cephalic index 0.73  HC / AC 1.16  FL / BPD 0.79  FL / AC 0.24  MVP 4.8 cm   bpm  Head / Face / Neck   3.2 mm    Fetal Anatomy  ===========    Cranium: normal  Lateral ventricles: normal  4-chamber view: documented previously  Stomach: normal  Kidneys: normal  Bladder: normal  Gender: male  Wants to know gender: yes  Other: A full anatomy survey previously performed.    Consultation  ==========    Returns for follow-up visit today.  Blood glucose measurements much improved on current regimen: Levemir 20 units in the morning and 42 units at bedtime with NovoLog 16  units at breakfast 18 units at lunch and 20 units at dinner  No hypoglycemic episodes. More compliant  with diet.  Denies any interval problems. Good fetal movement. No  labor sx.    Fetal growth at 51st percentile, normal fluid.    Face to face time approximately 15 min, >50% counseling    Impression  =========    Fetal size is AGA with the EFW at the 51st percentile.  Normal repeat limited fetal anatomic survey. AFV is normal.    Recommendation  ==============    Follow-up office visit in 2 weeks for blood glucose review; continue current regimen.  We recommend repeat evaluation for fetal growth assessment in 4 weeks and initiate  testing at that time.  Delivery most likely at around 37-38 weeks if remains stable.

## 2018-01-23 NOTE — LETTER
January 23, 2018      Nidhi Miller MD  [address] [facility]  [address]  [facility] [address]           [facility] - Maternal Fetal Med  [address]  [facility] [address]  Phone: [phone]          Patient: Jimena Hazel   MR Number: [id number]   YOB: 1980   Date of Visit: 1/23/2018       Dear Dr. Nidhi Miller:    Thank you for referring Jimena Hazel to me for evaluation. Attached you will find relevant portions of my assessment and plan of care.    If you have questions, please do not hesitate to call me. I look forward to following Jimena Hazel along with you.    Sincerely,    Blair Henry MD    Enclosure  CC:  No Recipients    If you would like to receive this communication electronically, please contact [email or web address] or [phone] to request more information on [employer] Link access.    For providers and/or their staff who would like to refer a patient to [facility], please contact us through our one-stop-shop provider referral line, [facility], at [phone].    If you feel you have received this communication in error or would no longer like to receive these types of communications, please e-mail [email or web address]

## 2018-02-06 ENCOUNTER — ROUTINE PRENATAL (OUTPATIENT)
Dept: MATERNAL FETAL MEDICINE | Facility: CLINIC | Age: 38
End: 2018-02-06
Payer: COMMERCIAL

## 2018-02-06 DIAGNOSIS — Z36.89 ENCOUNTER FOR ULTRASOUND TO CHECK FETAL GROWTH: Primary | ICD-10-CM

## 2018-02-06 DIAGNOSIS — O24.913 DIABETES MELLITUS AFFECTING PREGNANCY, THIRD TRIMESTER: Primary | ICD-10-CM

## 2018-02-06 PROCEDURE — 3008F BODY MASS INDEX DOCD: CPT | Mod: S$GLB,,, | Performed by: OBSTETRICS & GYNECOLOGY

## 2018-02-06 PROCEDURE — 99213 OFFICE O/P EST LOW 20 MIN: CPT | Mod: S$GLB,,, | Performed by: OBSTETRICS & GYNECOLOGY

## 2018-02-06 NOTE — PROGRESS NOTES
[facility] CLINIC FOLLOW-UP    Jimena returned today for follow-up visit.  She is currently 30 weeks and 6 days gestation.  She denies any significant problems since her last visit.  She brings in her blood sugar log and I reviewed it.  Overall they're markedly improved from where they were 1-2 months ago but still somewhat erratic.  Overall her fasting blood sugars are better but her 2 hour postprandial after breakfast still has some elevated values.  Her post lunch and post dinner are reasonable.  She has had one hypoglycemic episode when she did not eat much for dinner but otherwise is doing fairly well.  Her current regimen is Levemir 20 units in the morning and 42 units at bedtime with NovoLog 16 units at breakfast, 18 units at lunch, and 20 units at dinner.    She reports good fetal movement.  She denies any signs or symptoms of  labor.  Her blood pressure today is 104/72.  Fetal heart tones are in the 130s on Doppler.    I overall spent approximately 20 minutes in face-to-face time with her.    1.  I changed her regimen to NovoLog 18 units with breakfast, 18 units at lunch, and 20 units at dinner.  She is to continue the same dosing of Levemir.  2.  She has a follow-up appointment scheduled for 2 weeks at which point we will begin her  testing and reevaluate fetal growth and her blood sugars.  3.  Given the difficulty in getting her blood sugars controlled recommend delivery at 37-38 weeks.    Blair Henry Jr., MD  Maternal Fetal Medicine

## 2018-02-12 ENCOUNTER — PATIENT MESSAGE (OUTPATIENT)
Dept: OBSTETRICS AND GYNECOLOGY | Facility: CLINIC | Age: 38
End: 2018-02-12

## 2018-02-12 DIAGNOSIS — O24.913 DIABETES MELLITUS DURING PREGNANCY IN THIRD TRIMESTER, UNSPECIFIED DIABETES MELLITUS TYPE: Primary | ICD-10-CM

## 2018-02-12 NOTE — TELEPHONE ENCOUNTER
Patient stated that the pain she feels is not contractions. Patient stated that she took a warm bath that helped with the pain. Patient advised to monitor the pain. Explained to the patient to time the pain from the start of on onset to the start of the next as well as if the pain last for two hours. Patient advised that tylenol is safe to take during pregnancy. Patient advised to go to the [facility] in L&D is she gets no relief or the pain is intolerable.  Patient denies nausea, vomiting, fever, chills and vaginal discharge/leaking. Patient reports that fetal movement is present. Patient verbalized understanding.

## 2018-02-16 ENCOUNTER — CLINICAL SUPPORT (OUTPATIENT)
Dept: PEDIATRIC CARDIOLOGY | Facility: CLINIC | Age: 38
End: 2018-02-16
Payer: COMMERCIAL

## 2018-02-16 ENCOUNTER — OFFICE VISIT (OUTPATIENT)
Dept: PEDIATRIC CARDIOLOGY | Facility: CLINIC | Age: 38
End: 2018-02-16
Attending: PEDIATRICS
Payer: COMMERCIAL

## 2018-02-16 VITALS
BODY MASS INDEX: 35.4 KG/M2 | HEIGHT: 65 IN | WEIGHT: 212.5 LBS | SYSTOLIC BLOOD PRESSURE: 102 MMHG | HEART RATE: 76 BPM | DIASTOLIC BLOOD PRESSURE: 70 MMHG

## 2018-02-16 DIAGNOSIS — O24.913 DIABETES MELLITUS DURING PREGNANCY IN THIRD TRIMESTER, UNSPECIFIED DIABETES MELLITUS TYPE: ICD-10-CM

## 2018-02-16 DIAGNOSIS — O24.414 INSULIN CONTROLLED GESTATIONAL DIABETES MELLITUS (GDM) IN THIRD TRIMESTER: Primary | ICD-10-CM

## 2018-02-16 PROCEDURE — 99213 OFFICE O/P EST LOW 20 MIN: CPT | Mod: 25,S$GLB,, | Performed by: PEDIATRICS

## 2018-02-16 PROCEDURE — 3008F BODY MASS INDEX DOCD: CPT | Mod: S$GLB,,, | Performed by: PEDIATRICS

## 2018-02-16 PROCEDURE — 99999 PR PBB SHADOW E&M-EST. PATIENT-LVL III: CPT | Mod: PBBFAC,,, | Performed by: PEDIATRICS

## 2018-02-16 PROCEDURE — 76826 ECHO EXAM OF FETAL HEART: CPT | Mod: S$GLB,,, | Performed by: PEDIATRICS

## 2018-02-16 PROCEDURE — 93325 DOPPLER ECHO COLOR FLOW MAPG: CPT | Mod: S$GLB,,, | Performed by: PEDIATRICS

## 2018-02-16 PROCEDURE — 76828 ECHO EXAM OF FETAL HEART: CPT | Mod: S$GLB,,, | Performed by: PEDIATRICS

## 2018-02-16 NOTE — LETTER
February 16, 2018        Nidhi Miller MD  [address]  [address]  [facility] [address]             [facility] - Pediatric Cardiology  [address]  [facility] [address]  Phone: [phone]  Fax: [phone]   Patient: Jimena Hazel   MR Number: [id number]   YOB: 1980   Date of Visit: 2/16/2018       Dear Dr. Miller:    Thank you for referring Jimena Hazel to me for evaluation. Below are the relevant portions of my assessment and plan of care.            If you have questions, please do not hesitate to call me. I look forward to following Jimena along with you.    Sincerely,      Carlos Enrique Chen MD           CC  Blair Henry MD

## 2018-02-16 NOTE — PROGRESS NOTES
"I had the pleasure of evaluating Jimena Hazel who is now 37 y.o.  and carrying her 3rd pregnancy at 32 2/7 weeks gestation. She was referred for evaluation of the fetal heart due to increased risks for congenital heart disease associated with insulin dependent diabetes. Previous fetal cardiac evaluation demonstrated normal anatomical connections and function for EGA and a follow up was recommended during third trimester to evaluate for diabetes associated with fetal cardiac hypertrophy. Ms. Hazel says that her glucose has been well controlled since the last visit and that there are no new concerns. Last A1c = 7.6.      Current Outpatient Prescriptions:     blood sugar diagnostic Strp, Check glucose 8x/day, Disp: 300 strip, Rfl: 11    glucagon (human recombinant) inj 1mg/mL kit, Follow package directions for low blood sugar., Disp: 1 kit, Rfl: 1    insulin aspart (NOVOLOG) 100 unit/mL InPn pen, Inject 20 Units into the skin daily with breakfast. (Patient taking differently: Inject 16 Units into the skin daily with breakfast. ), Disp: 18 mL, Rfl: 3    insulin aspart (NOVOLOG) 100 unit/mL InPn pen, Inject 22 Units into the skin with lunch. (Patient taking differently: Inject 20 Units into the skin with lunch. ), Disp: 19.8 mL, Rfl: 3    insulin aspart (NOVOLOG) 100 unit/mL InPn pen, Inject 18 Units into the skin before dinner. (Patient taking differently: Inject 20 Units into the skin before dinner. ), Disp: 16.2 mL, Rfl: 3    insulin detemir (LEVEMIR FLEXTOUCH) 100 unit/mL (3 mL) SubQ InPn pen, Inject 26 Units into the skin every evening. (Patient taking differently: Inject 42 Units into the skin every evening. As of 1/9/18: Levemir 20 units in am and 42 units QHS), Disp: 1 Box, Rfl: 3    pen needle, diabetic (BD INSULIN PEN NEEDLE UF MINI) 31 gauge x 3/16" Ndle, To use 5-6x/day with insulin injections., Disp: 200 each, Rfl: 3    acetaminophen (TYLENOL) 325 MG tablet, Take 500 mg by mouth every 6 (six) hours " as needed for Pain., Disp: , Rfl:     aspirin 81 MG Chew, Take 1 tablet (81 mg total) by mouth once daily., Disp: , Rfl: 0  Review of patient's allergies indicates:  No Known Allergies    Maternal factors increasing risk for congenital heart disease: As noted above  Paternal factors increasing risk for congenital heart disease: Unremarkable.    FETAL ECHOCARDIOGRAM (preliminary):  Normal fetal cardiac connections and function for EGA.  No obvious hypertrophy of the fetal myocardium  (Full report in electronic medical record)    I reviewed the strengths and weaknesses of fetal echocardiography. I also reviewed the current study that demonstrates normal cardiac connections and function with no evidence of myocardial hypertrophy. Within the limitations imposed by fetal physiology, I would expect a high probability that this infant will be born with a normal heart. I did suggest that Ms. Hazel continue with careful attention to her serum glucose since there is still a possibility for cardiac hypertrophy to evolve.    I answered all the family's questions. I have not scheduled another evaluation; however, if questions arise later during gestation or after delivery, I would be happy to assist in any way. Ms. Hazel is comfortable with the plan.    RECOMMENDATIONS:  Evaluation of the infant after delivery if the clinical exam is abnormal        Note:  Over 50% of visit in consultation with the family >20 minutes

## 2018-02-19 ENCOUNTER — ROUTINE PRENATAL (OUTPATIENT)
Dept: OBSTETRICS AND GYNECOLOGY | Facility: CLINIC | Age: 38
End: 2018-02-19
Payer: COMMERCIAL

## 2018-02-19 VITALS
SYSTOLIC BLOOD PRESSURE: 108 MMHG | WEIGHT: 209.44 LBS | BODY MASS INDEX: 34.85 KG/M2 | DIASTOLIC BLOOD PRESSURE: 60 MMHG

## 2018-02-19 DIAGNOSIS — O24.013 TYPE 1 DIABETES MELLITUS DURING PREGNANCY IN THIRD TRIMESTER: ICD-10-CM

## 2018-02-19 DIAGNOSIS — Z3A.32 32 WEEKS GESTATION OF PREGNANCY: Primary | ICD-10-CM

## 2018-02-19 PROCEDURE — 99999 PR PBB SHADOW E&M-EST. PATIENT-LVL III: CPT | Mod: PBBFAC,,, | Performed by: OBSTETRICS & GYNECOLOGY

## 2018-02-19 PROCEDURE — 0502F SUBSEQUENT PRENATAL CARE: CPT | Mod: S$GLB,,, | Performed by: OBSTETRICS & GYNECOLOGY

## 2018-02-19 NOTE — PROGRESS NOTES
HERE for routine OB visit at 31 5/7 wks, with back pain, leg pain, pain that radiates into her buttocks.  Rest and warm soaks help. offerred PT- declines.  Maternity spanks and support hose.   Denies vaginal bleeding, cramping/ ctx, or LOF.  + FM.  FMC BID.   Tdap tdap.  Family recommendations.  Wants BTL with CS at 37- 38 wks.  MFM recommended early delivery.  Discussed risks. Didnot bring her log today.   WIll bring for [facility] tomorrow per patient.  US tomorrow.  F/U in two weeks.

## 2018-02-20 ENCOUNTER — OFFICE VISIT (OUTPATIENT)
Dept: MATERNAL FETAL MEDICINE | Facility: CLINIC | Age: 38
End: 2018-02-20
Payer: COMMERCIAL

## 2018-02-20 VITALS
WEIGHT: 209.69 LBS | DIASTOLIC BLOOD PRESSURE: 78 MMHG | SYSTOLIC BLOOD PRESSURE: 108 MMHG | BODY MASS INDEX: 34.89 KG/M2

## 2018-02-20 DIAGNOSIS — Z36.89 ENCOUNTER FOR ULTRASOUND TO CHECK FETAL GROWTH: ICD-10-CM

## 2018-02-20 DIAGNOSIS — O24.410 DIET CONTROLLED GESTATIONAL DIABETES MELLITUS (GDM) IN THIRD TRIMESTER: Primary | ICD-10-CM

## 2018-02-20 PROCEDURE — 3008F BODY MASS INDEX DOCD: CPT | Mod: S$GLB,,, | Performed by: OBSTETRICS & GYNECOLOGY

## 2018-02-20 PROCEDURE — 76819 FETAL BIOPHYS PROFIL W/O NST: CPT | Mod: S$GLB,,, | Performed by: OBSTETRICS & GYNECOLOGY

## 2018-02-20 PROCEDURE — 76816 OB US FOLLOW-UP PER FETUS: CPT | Mod: S$GLB,,, | Performed by: OBSTETRICS & GYNECOLOGY

## 2018-02-20 PROCEDURE — 99213 OFFICE O/P EST LOW 20 MIN: CPT | Mod: 25,S$GLB,, | Performed by: OBSTETRICS & GYNECOLOGY

## 2018-02-20 PROCEDURE — 99999 PR PBB SHADOW E&M-EST. PATIENT-LVL III: CPT | Mod: PBBFAC,,, | Performed by: OBSTETRICS & GYNECOLOGY

## 2018-02-20 NOTE — LETTER
February 20, 2018      Nidhi Miller MD  [address] [facility]  [address]  [facility] [address]           [facility] - Maternal Fetal Med  [address]  [facility] [address]  Phone: [phone]          Patient: Jimena Hazel   MR Number: [id number]   YOB: 1980   Date of Visit: 2/20/2018       Dear Dr. Nidhi Miller:    Thank you for referring Jimena Hazel to me for evaluation. Attached you will find relevant portions of my assessment and plan of care.    If you have questions, please do not hesitate to call me. I look forward to following Jimena Hazel along with you.    Sincerely,    Blair Henry MD    Enclosure  CC:  No Recipients    If you would like to receive this communication electronically, please contact [email or web address] or [phone] to request more information on [employer] Link access.    For providers and/or their staff who would like to refer a patient to [facility], please contact us through our one-stop-shop provider referral line, [facility], at [phone].    If you feel you have received this communication in error or would no longer like to receive these types of communications, please e-mail [email or web address]

## 2018-02-21 ENCOUNTER — HOSPITAL ENCOUNTER (EMERGENCY)
Facility: OTHER | Age: 38
Discharge: HOME | End: 2018-02-21
Attending: OBSTETRICS & GYNECOLOGY
Payer: COMMERCIAL

## 2018-02-21 ENCOUNTER — TELEPHONE (OUTPATIENT)
Dept: MATERNAL FETAL MEDICINE | Facility: CLINIC | Age: 38
End: 2018-02-21

## 2018-02-21 VITALS
DIASTOLIC BLOOD PRESSURE: 74 MMHG | OXYGEN SATURATION: 100 % | SYSTOLIC BLOOD PRESSURE: 123 MMHG | TEMPERATURE: 98 F | RESPIRATION RATE: 18 BRPM | HEART RATE: 97 BPM

## 2018-02-21 DIAGNOSIS — O47.9 IRREGULAR UTERINE CONTRACTIONS: Primary | ICD-10-CM

## 2018-02-21 DIAGNOSIS — O24.013 TYPE 1 DIABETES MELLITUS AFFECTING PREGNANCY IN THIRD TRIMESTER, ANTEPARTUM: Primary | ICD-10-CM

## 2018-02-21 DIAGNOSIS — Z3A.33 33 WEEKS GESTATION OF PREGNANCY: ICD-10-CM

## 2018-02-21 DIAGNOSIS — R82.4 KETONURIA: ICD-10-CM

## 2018-02-21 DIAGNOSIS — O24.013 TYPE 1 DIABETES MELLITUS IN PREGNANCY, THIRD TRIMESTER: ICD-10-CM

## 2018-02-21 LAB
ALBUMIN SERPL BCP-MCNC: 2.7 G/DL
ALP SERPL-CCNC: 96 U/L
ALT SERPL W/O P-5'-P-CCNC: 5 U/L
ANION GAP SERPL CALC-SCNC: 7 MMOL/L
AST SERPL-CCNC: 13 U/L
B-OH-BUTYR BLD STRIP-SCNC: 0.1 MMOL/L
BASOPHILS # BLD AUTO: 0 K/UL
BASOPHILS NFR BLD: 0 %
BILIRUB SERPL-MCNC: 0.3 MG/DL
BILIRUB SERPL-MCNC: NEGATIVE MG/DL
BLOOD URINE, POC: NORMAL
BUN SERPL-MCNC: 4 MG/DL
CALCIUM SERPL-MCNC: 9.2 MG/DL
CHLORIDE SERPL-SCNC: 105 MMOL/L
CO2 SERPL-SCNC: 22 MMOL/L
COLOR, POC UA: NORMAL
CREAT SERPL-MCNC: 0.7 MG/DL
DIFFERENTIAL METHOD: ABNORMAL
EOSINOPHIL # BLD AUTO: 0 K/UL
EOSINOPHIL NFR BLD: 0.3 %
ERYTHROCYTE [DISTWIDTH] IN BLOOD BY AUTOMATED COUNT: 13.1 %
EST. GFR  (AFRICAN AMERICAN): >60 ML/MIN/1.73 M^2
EST. GFR  (NON AFRICAN AMERICAN): >60 ML/MIN/1.73 M^2
GLUCOSE SERPL-MCNC: 190 MG/DL
GLUCOSE UR QL STRIP: NORMAL
HCT VFR BLD AUTO: 31 %
HGB BLD-MCNC: 10.4 G/DL
KETONES UR QL STRIP: NORMAL
LEUKOCYTE ESTERASE URINE, POC: NEGATIVE
LYMPHOCYTES # BLD AUTO: 1.4 K/UL
LYMPHOCYTES NFR BLD: 21.1 %
MCH RBC QN AUTO: 30.8 PG
MCHC RBC AUTO-ENTMCNC: 33.5 G/DL
MCV RBC AUTO: 92 FL
MONOCYTES # BLD AUTO: 0.3 K/UL
MONOCYTES NFR BLD: 3.8 %
NEUTROPHILS # BLD AUTO: 5 K/UL
NEUTROPHILS NFR BLD: 74.5 %
NITRITE, POC UA: NEGATIVE
PH, POC UA: 7
PLATELET # BLD AUTO: 153 K/UL
PMV BLD AUTO: 10.1 FL
POTASSIUM SERPL-SCNC: 3.9 MMOL/L
PROT SERPL-MCNC: 6.6 G/DL
PROTEIN, POC: NEGATIVE
RBC # BLD AUTO: 3.38 M/UL
SODIUM SERPL-SCNC: 134 MMOL/L
SPECIFIC GRAVITY, POC UA: 1.01
UROBILINOGEN, POC UA: NORMAL
WBC # BLD AUTO: 6.77 K/UL

## 2018-02-21 PROCEDURE — 81002 URINALYSIS NONAUTO W/O SCOPE: CPT

## 2018-02-21 PROCEDURE — 99284 EMERGENCY DEPT VISIT MOD MDM: CPT | Mod: 25

## 2018-02-21 PROCEDURE — 80053 COMPREHEN METABOLIC PANEL: CPT

## 2018-02-21 PROCEDURE — 82010 KETONE BODYS QUAN: CPT

## 2018-02-21 PROCEDURE — 59025 FETAL NON-STRESS TEST: CPT

## 2018-02-21 PROCEDURE — 85025 COMPLETE CBC W/AUTO DIFF WBC: CPT

## 2018-02-21 NOTE — PROGRESS NOTES
Obstetrical ultrasound completed today.  See report in imaging section of [facility].      Jimena returns today for follow-up.  She is overall doing well.  She denies any significant problems since her last visit.  She reports good fetal movement.  She denies any signs or symptoms of  labor.  No evidence of symptoms of preeclampsia.  Her blood pressure today is 108/78.    She brought in her blood sugar log which overall is much improved from where it was a few months ago.  Several of her blood sugars are still out of range and I'm going to adjust her regimen.    Her new regimen increases everything by 2 units except for her evening Levemir which is increased by 4.  Her new regimen is:  Levemir 22 units with breakfast 46 units in the evening  NovoLog 20 units with breakfast, 20 units with lunch, 22 units with dinner    A follow-up ultrasound evaluation was performed today and fetal growth is at the 25th percentile.  A biophysical profile is reassuring.  The amniotic fluid volume is normal.    I overall spent approximately 15 minutes in face-to-face time with her and her  greater than 50% of which was spent in counseling time in further care coordination.    I plan to see her again in approximately 2 weeks for a blood sugar check.  We will work on file delivery recommendations at that time but given the difficulty with her glycemic control, delivery between 37 and 38 weeks is indicated unless earlier indications arise.

## 2018-02-21 NOTE — ED PROVIDER NOTES
Encounter Date: 2018       History     Chief Complaint   Patient presents with    Contractions     Jimena Hazel is a 37 y.o. [id number] at 33w0d presents complaining of irregular contractions since 9pm last night. Patient cannot tell me how often and how long her contractions last. She states she is feeling increased pelvic pressure. She is not having any pain near her  scar. She is a type 1 Dm. She is currently taking levemir 20 in the Am, levemir 46 at night, and Novalog . She saw MFM yesterday where they commented that her blood sugars were better controlled. This IUP is complicated by type 1 Dm, h/o of C/S x2, AMA, and unwanted fertility. Patient is scheduled for a RLTCS on 3/26/18. Patient reports contractions, denies vaginal bleeding, denies LOF.   Fetal Movement: normal.            Review of patient's allergies indicates:  No Known Allergies  Past Medical History:   Diagnosis Date    Diabetes mellitus     Diabetes mellitus type I     Irregular heartbeat      Past Surgical History:   Procedure Laterality Date     SECTION, LOW TRANSVERSE       Family History   Problem Relation Age of Onset    Diabetes Mother     Hypertension Mother     Diabetes Father     No Known Problems Sister     No Known Problems Brother     Congenital heart disease Neg Hx     Pacemaker/defibrilator Neg Hx     Early death Neg Hx      Social History   Substance Use Topics    Smoking status: Never Smoker    Smokeless tobacco: Never Used    Alcohol use No     Review of Systems   Constitutional: Negative for chills, fatigue and fever.   HENT: Negative for congestion.    Eyes: Negative for visual disturbance.   Respiratory: Negative for cough and shortness of breath.    Cardiovascular: Negative for chest pain and palpitations.   Gastrointestinal: Positive for abdominal pain. Negative for abdominal distention, constipation, diarrhea, nausea and vomiting.   Genitourinary: Negative for difficulty  urinating, dysuria, hematuria, vaginal bleeding and vaginal discharge.   Skin: Negative for rash.   Neurological: Negative for dizziness, seizures, light-headedness and headaches.   Hematological: Does not bruise/bleed easily.   Psychiatric/Behavioral: Negative for dysphoric mood. The patient is not nervous/anxious.    All other systems reviewed and are negative.      Physical Exam     Initial Vitals   BP Pulse Resp Temp SpO2   -- -- -- -- --      MAP       --       /74   Pulse 97   Temp 97.5 °F (36.4 °C) (Temporal)   Resp 18   LMP 06/25/2017 (Approximate)   SpO2 100%   Breastfeeding? No     Physical Exam    Vitals reviewed.  Constitutional: She appears well-developed and well-nourished.   Cardiovascular: Normal rate and regular rhythm.   Pulmonary/Chest: No respiratory distress.   Abdominal: Soft. She exhibits no distension. There is no tenderness.   Gravid     Musculoskeletal: She exhibits no edema.   Neurological: She is alert and oriented to person, place, and time.   Skin: Skin is warm and dry.   Psychiatric: She has a normal mood and affect.     OB LABOR EXAM:   Pre-Term Labor: No.   Membranes ruptured: No.   Method: Sterile vaginal exam per MD.       Dilatation: 0.   Station: -3.         Comments: Cl/th/hi       ED Course   Obtain Fetal nonstress test (NST)  Date/Time: 2/21/2018 3:18 PM  Performed by: MARIE PETERSEN  Authorized by: MARIE PETERSEN     Nonstress Test:     Variability:  6-25 BPM    Decelerations:  None    Accelerations:  15 bpm    Baseline:  140    Uterine Irritability: No      Contractions:  Not present  Biophysical Profile:     Nonstress Test Interpretation: reactive      Overall Impression:  Reassuring        Labs Reviewed - No data to display          Medical Decision Making:   History:   Old Medical Records: I decided to obtain old medical records.  Old Records Summarized: records from clinic visits.       <> Summary of Records: Patient seen by [facility] for Type 1 Dm.   ED  Management:  - NST reactive and reassuring with no ctx seen  - Cervix Cl/th/hi  - Udip showed glucose and 2+ ketones  - As patient is a type 1 Dm, labs were collected to r/o potential DKA  - CBC 10/31  - CMP WNL, glucose 190 however patient ate 1  Hour prior to test. Electrolytes WNL.   - betahydroxybutyrate 0.1, WNL  - repeat cervical check showed patient still cl/th/hi  - patient's pain improved with po hydration   - discussed lashawn arora contractions and increased fluid intake 2/2 to ketones present in udip  - Discussed increasing po hydration  - patient well appearing and agrees with plan  - Discussed ED and labor precautions  - to keep apt on Monday              Attending Attestation:   Physician Attestation Statement for Resident:  As the supervising MD   Physician Attestation Statement: I have personally seen and examined this patient.   I agree with the above history. -:   As the supervising MD I agree with the above PE.    As the supervising MD I agree with the above treatment, course, plan, and disposition.  I was personally present during the critical portions of the procedure(s) performed by the resident and was immediately available in the ED to provide services and assistance as needed during the entire procedure.  I have reviewed and agree with the residents interpretation of the following: lab data.  I have reviewed the following: old records at this facility.                       Clinical Impression:   The primary encounter diagnosis was Irregular uterine contractions. Diagnoses of 33 weeks gestation of pregnancy, Type 1 diabetes mellitus in pregnancy, third trimester, and Ketonuria were also pertinent to this visit.                           Ching Alvarado MD  Resident  02/21/18 [id number]       Ifrah Briones MD  02/21/18 [id number]

## 2018-02-21 NOTE — PROGRESS NOTES
"Pt presents to the [facility] complaining of contractions. Pt states she has had cramping that comes and goes "every few minutes" since 2100 last night. She also feels pressure in her back and "down below" in her vaginal area. Pt denies loss of fluid like her water bag broke or vaginal bleeding, reports good fetal movement. VSS, placed on fetal monitor, Dr. Alvarado notified.  "

## 2018-02-21 NOTE — TELEPHONE ENCOUNTER
Message left for pt to call [facility] clinic at [phone].  Per Dr. Henry, will coordinate weekly PNT appointments for patient.

## 2018-02-21 NOTE — DISCHARGE INSTRUCTIONS
Call your healthcare provider right away if any of these occur:  · Youre having contractions 4 or more in an hour for 1-2 hours.  · You have contractions that are regular, getting longer, stronger, and closer together.  · Your water breaks.  · You have vaginal bleeding more than spotting, fills a pad in an hour.  · You feel a decrease in your babys movement or any other unusual changes.   · Youre not sure if you are having false or true labor.  Labor and Delivery: [phone]

## 2018-02-21 NOTE — TELEPHONE ENCOUNTER
"Pt returned call to [facility] to schedule weekly PNT visits and patient reported "extreme cramping and pain." Pt does report +FM. Pt has not contacted her OB but I did instruct patient to the OB ED for evaluation. Pt verbalized understanding of information and states that she is coming to the hospital.      "

## 2018-02-23 ENCOUNTER — PATIENT MESSAGE (OUTPATIENT)
Dept: MATERNAL FETAL MEDICINE | Facility: CLINIC | Age: 38
End: 2018-02-23

## 2018-02-23 ENCOUNTER — TELEPHONE (OUTPATIENT)
Dept: MATERNAL FETAL MEDICINE | Facility: CLINIC | Age: 38
End: 2018-02-23

## 2018-02-23 NOTE — TELEPHONE ENCOUNTER
"Phone call after patient's portal message regarding elevated blood sugar. Discussed with Dr. Henry the following reported BS:   18 11am: 138      Bedtime: 181  18 fastin    2 hours after breakfast @1049a: 230   Bedtime: 158  18 7a: 208  1040a: 184.    Pt states that she is "feeling fine," and reports +FM. Reviewed with patient her insulin regime as previously outlined by Dr. Henry and patient confirmed her insulin dosage. She also denies "missing or holding" any of her scheduled insulin.     Pt instructed to continue to follow regime, to check Blood Sugars 4 times per day and to call L&D over the weekend for any reading >300. Patient is to call [facility] clinic on 18 to review weekend blood sugar log.    Pt verbalized understanding of information.    "

## 2018-02-26 ENCOUNTER — PATIENT MESSAGE (OUTPATIENT)
Dept: MATERNAL FETAL MEDICINE | Facility: CLINIC | Age: 38
End: 2018-02-26

## 2018-02-27 ENCOUNTER — HOSPITAL ENCOUNTER (OUTPATIENT)
Dept: PERINATAL CARE | Facility: OTHER | Age: 38
Discharge: HOME | End: 2018-02-27
Attending: OBSTETRICS & GYNECOLOGY
Payer: COMMERCIAL

## 2018-02-27 DIAGNOSIS — O24.013 TYPE 1 DIABETES MELLITUS AFFECTING PREGNANCY IN THIRD TRIMESTER, ANTEPARTUM: ICD-10-CM

## 2018-02-27 PROCEDURE — 76819 FETAL BIOPHYS PROFIL W/O NST: CPT

## 2018-02-27 PROCEDURE — 99499 UNLISTED E&M SERVICE: CPT | Mod: ,,, | Performed by: PEDIATRICS

## 2018-03-05 ENCOUNTER — HOSPITAL ENCOUNTER (INPATIENT)
Facility: OTHER | Age: 38
LOS: 6 days | Discharge: HOME | End: 2018-03-12
Attending: OBSTETRICS & GYNECOLOGY | Admitting: OBSTETRICS & GYNECOLOGY
Payer: COMMERCIAL

## 2018-03-05 ENCOUNTER — ANESTHESIA EVENT (OUTPATIENT)
Dept: OBSTETRICS AND GYNECOLOGY | Facility: OTHER | Age: 38
End: 2018-03-05

## 2018-03-05 ENCOUNTER — ANESTHESIA (OUTPATIENT)
Dept: OBSTETRICS AND GYNECOLOGY | Facility: OTHER | Age: 38
End: 2018-03-05

## 2018-03-05 ENCOUNTER — OFFICE VISIT (OUTPATIENT)
Dept: MATERNAL FETAL MEDICINE | Facility: CLINIC | Age: 38
End: 2018-03-05
Payer: COMMERCIAL

## 2018-03-05 VITALS
WEIGHT: 215.81 LBS | DIASTOLIC BLOOD PRESSURE: 78 MMHG | BODY MASS INDEX: 35.92 KG/M2 | SYSTOLIC BLOOD PRESSURE: 120 MMHG

## 2018-03-05 DIAGNOSIS — E10.65 TYPE 1 DIABETES MELLITUS WITH HYPERGLYCEMIA: Primary | ICD-10-CM

## 2018-03-05 DIAGNOSIS — O24.919 DIABETES IN PREGNANCY: ICD-10-CM

## 2018-03-05 DIAGNOSIS — O24.013 PRE-EXISTING TYPE 1 DIABETES MELLITUS DURING PREGNANCY IN THIRD TRIMESTER: ICD-10-CM

## 2018-03-05 DIAGNOSIS — Z3A.34 34 WEEKS GESTATION OF PREGNANCY: ICD-10-CM

## 2018-03-05 DIAGNOSIS — E10.65 UNCONTROLLED TYPE 1 DIABETES MELLITUS WITH HYPERGLYCEMIA: ICD-10-CM

## 2018-03-05 DIAGNOSIS — O24.410 DIET CONTROLLED GESTATIONAL DIABETES MELLITUS (GDM) IN THIRD TRIMESTER: ICD-10-CM

## 2018-03-05 LAB
ABO + RH BLD: NORMAL
ALBUMIN SERPL BCP-MCNC: 2.6 G/DL
ALP SERPL-CCNC: 111 U/L
ALT SERPL W/O P-5'-P-CCNC: 13 U/L
ANION GAP SERPL CALC-SCNC: 10 MMOL/L
AST SERPL-CCNC: 18 U/L
BASOPHILS # BLD AUTO: 0 K/UL
BASOPHILS NFR BLD: 0 %
BILIRUB SERPL-MCNC: 0.2 MG/DL
BLD GP AB SCN CELLS X3 SERPL QL: NORMAL
BUN SERPL-MCNC: 9 MG/DL
CALCIUM SERPL-MCNC: 9.1 MG/DL
CHLORIDE SERPL-SCNC: 106 MMOL/L
CO2 SERPL-SCNC: 22 MMOL/L
CREAT SERPL-MCNC: 0.7 MG/DL
DIFFERENTIAL METHOD: ABNORMAL
EOSINOPHIL # BLD AUTO: 0 K/UL
EOSINOPHIL NFR BLD: 0.2 %
ERYTHROCYTE [DISTWIDTH] IN BLOOD BY AUTOMATED COUNT: 13.1 %
EST. GFR  (AFRICAN AMERICAN): >60 ML/MIN/1.73 M^2
EST. GFR  (NON AFRICAN AMERICAN): >60 ML/MIN/1.73 M^2
ESTIMATED AVG GLUCOSE: 143 MG/DL
GLUCOSE SERPL-MCNC: 102 MG/DL
HBA1C MFR BLD HPLC: 6.6 %
HCT VFR BLD AUTO: 33.2 %
HGB BLD-MCNC: 11.1 G/DL
LYMPHOCYTES # BLD AUTO: 1.1 K/UL
LYMPHOCYTES NFR BLD: 17.1 %
MCH RBC QN AUTO: 30.4 PG
MCHC RBC AUTO-ENTMCNC: 33.4 G/DL
MCV RBC AUTO: 91 FL
MONOCYTES # BLD AUTO: 0.4 K/UL
MONOCYTES NFR BLD: 5.8 %
NEUTROPHILS # BLD AUTO: 4.9 K/UL
NEUTROPHILS NFR BLD: 76.4 %
PLATELET # BLD AUTO: 173 K/UL
PMV BLD AUTO: 10.3 FL
POCT GLUCOSE: 118 MG/DL (ref 70–110)
POTASSIUM SERPL-SCNC: 3.8 MMOL/L
PROT SERPL-MCNC: 6.9 G/DL
RBC # BLD AUTO: 3.65 M/UL
SODIUM SERPL-SCNC: 138 MMOL/L
WBC # BLD AUTO: 6.36 K/UL

## 2018-03-05 PROCEDURE — 83036 HEMOGLOBIN GLYCOSYLATED A1C: CPT

## 2018-03-05 PROCEDURE — 85025 COMPLETE CBC W/AUTO DIFF WBC: CPT

## 2018-03-05 PROCEDURE — 99499 UNLISTED E&M SERVICE: CPT | Mod: S$GLB,,, | Performed by: OBSTETRICS & GYNECOLOGY

## 2018-03-05 PROCEDURE — 63600175 PHARM REV CODE 636 W HCPCS: Performed by: STUDENT IN AN ORGANIZED HEALTH CARE EDUCATION/TRAINING PROGRAM

## 2018-03-05 PROCEDURE — 86703 HIV-1/HIV-2 1 RESULT ANTBDY: CPT

## 2018-03-05 PROCEDURE — 80053 COMPREHEN METABOLIC PANEL: CPT

## 2018-03-05 PROCEDURE — 99999 PR PBB SHADOW E&M-EST. PATIENT-LVL III: CPT | Mod: PBBFAC,,, | Performed by: OBSTETRICS & GYNECOLOGY

## 2018-03-05 PROCEDURE — G0378 HOSPITAL OBSERVATION PER HR: HCPCS

## 2018-03-05 PROCEDURE — 76819 FETAL BIOPHYS PROFIL W/O NST: CPT | Mod: S$GLB,,, | Performed by: OBSTETRICS & GYNECOLOGY

## 2018-03-05 PROCEDURE — 86901 BLOOD TYPING SEROLOGIC RH(D): CPT

## 2018-03-05 PROCEDURE — 86592 SYPHILIS TEST NON-TREP QUAL: CPT

## 2018-03-05 RX ORDER — IBUPROFEN 200 MG
16 TABLET ORAL
Status: DISCONTINUED | OUTPATIENT
Start: 2018-03-05 | End: 2018-03-12 | Stop reason: HOSPADM

## 2018-03-05 RX ORDER — ONDANSETRON 8 MG/1
8 TABLET, ORALLY DISINTEGRATING ORAL EVERY 8 HOURS PRN
Status: DISCONTINUED | OUTPATIENT
Start: 2018-03-05 | End: 2018-03-12 | Stop reason: HOSPADM

## 2018-03-05 RX ORDER — NAPROXEN SODIUM 220 MG/1
81 TABLET, FILM COATED ORAL DAILY
Status: DISCONTINUED | OUTPATIENT
Start: 2018-03-06 | End: 2018-03-12 | Stop reason: HOSPADM

## 2018-03-05 RX ORDER — AMOXICILLIN 250 MG
1 CAPSULE ORAL NIGHTLY PRN
Status: DISCONTINUED | OUTPATIENT
Start: 2018-03-05 | End: 2018-03-12 | Stop reason: HOSPADM

## 2018-03-05 RX ORDER — SIMETHICONE 80 MG
1 TABLET,CHEWABLE ORAL EVERY 6 HOURS PRN
Status: DISCONTINUED | OUTPATIENT
Start: 2018-03-05 | End: 2018-03-12 | Stop reason: HOSPADM

## 2018-03-05 RX ORDER — INSULIN ASPART 100 [IU]/ML
20 INJECTION, SOLUTION INTRAVENOUS; SUBCUTANEOUS
Status: DISCONTINUED | OUTPATIENT
Start: 2018-03-06 | End: 2018-03-07

## 2018-03-05 RX ORDER — INSULIN ASPART 100 [IU]/ML
20 INJECTION, SOLUTION INTRAVENOUS; SUBCUTANEOUS
Status: DISCONTINUED | OUTPATIENT
Start: 2018-03-05 | End: 2018-03-07

## 2018-03-05 RX ORDER — GLUCAGON 1 MG
1 KIT INJECTION
Status: DISCONTINUED | OUTPATIENT
Start: 2018-03-05 | End: 2018-03-12 | Stop reason: HOSPADM

## 2018-03-05 RX ORDER — IBUPROFEN 200 MG
24 TABLET ORAL
Status: DISCONTINUED | OUTPATIENT
Start: 2018-03-05 | End: 2018-03-12 | Stop reason: HOSPADM

## 2018-03-05 RX ORDER — INSULIN ASPART 100 [IU]/ML
18 INJECTION, SOLUTION INTRAVENOUS; SUBCUTANEOUS
Status: DISCONTINUED | OUTPATIENT
Start: 2018-03-06 | End: 2018-03-06

## 2018-03-05 RX ORDER — DIPHENHYDRAMINE HCL 25 MG
25 CAPSULE ORAL EVERY 4 HOURS PRN
Status: DISCONTINUED | OUTPATIENT
Start: 2018-03-05 | End: 2018-03-12 | Stop reason: HOSPADM

## 2018-03-05 RX ORDER — DIPHENHYDRAMINE HYDROCHLORIDE 50 MG/ML
25 INJECTION INTRAMUSCULAR; INTRAVENOUS EVERY 4 HOURS PRN
Status: DISCONTINUED | OUTPATIENT
Start: 2018-03-05 | End: 2018-03-12 | Stop reason: HOSPADM

## 2018-03-05 RX ORDER — INSULIN ASPART 100 [IU]/ML
1 INJECTION, SOLUTION INTRAVENOUS; SUBCUTANEOUS 3 TIMES DAILY PRN
Status: DISCONTINUED | OUTPATIENT
Start: 2018-03-05 | End: 2018-03-12 | Stop reason: HOSPADM

## 2018-03-05 RX ORDER — INSULIN ASPART 100 [IU]/ML
1 INJECTION, SOLUTION INTRAVENOUS; SUBCUTANEOUS 3 TIMES DAILY
Status: DISCONTINUED | OUTPATIENT
Start: 2018-03-05 | End: 2018-03-05

## 2018-03-05 RX ORDER — PRENATAL WITH FERROUS FUM AND FOLIC ACID 3080; 920; 120; 400; 22; 1.84; 3; 20; 10; 1; 12; 200; 27; 25; 2 [IU]/1; [IU]/1; MG/1; [IU]/1; MG/1; MG/1; MG/1; MG/1; MG/1; MG/1; UG/1; MG/1; MG/1; MG/1; MG/1
1 TABLET ORAL DAILY
Status: DISCONTINUED | OUTPATIENT
Start: 2018-03-06 | End: 2018-03-12 | Stop reason: HOSPADM

## 2018-03-05 RX ADMIN — INSULIN ASPART 20 UNITS: 100 INJECTION, SOLUTION INTRAVENOUS; SUBCUTANEOUS at 06:03

## 2018-03-05 RX ADMIN — INSULIN DETEMIR 46 UNITS: 100 INJECTION, SOLUTION SUBCUTANEOUS at 09:03

## 2018-03-05 NOTE — NURSING
Per Dr. Henry's orders, patient to be admitted to [facility] for Uncontrolled Diabetes. Patient with Type 1 Diabetes.    Nurse phoned [facility] L&D Charge Nurse to let her know of pending admission.     Charge nurse verbalized understanding of information received. Nurse waiting on L&D Charge Nurse to call back to let her know where to send patient.

## 2018-03-05 NOTE — H&P
[facility]  Obstetrics  History & Physical    Patient Name: Jimena Hazel  MRN: [id number]  Admission Date: 3/5/2018  Primary Care Provider: Levi Doyle MD    Subjective:     Principal Problem:Diabetes type 1, uncontrolled    History of Present Illness:  Jimena Hazel is a 37 y.o. [id number] at 34w5d presents from [facility] clinic  to elevated blood sugars in her blood sugar log. Patient is a type 1 Dm, diagnosed 4 years ago, who is insulin dependent. Her regimen includes taking levemir 20 in the morning and 46 at night. She also takes novalog 18//20 with meals. Takes her blood sugars 4x a day. Denies polyuria, polydipsia. Denies feeling weak or fatigued. Denies any nausea or vomiting with meals.   This IUP is complicated by type 1 Dm, h/o of 2 prior LTCS, and unwanted fertility . Patient is scheduled to have a repeat LTCS and BTL between 37-38 weeks.   Patient denies contractions, denies vaginal bleeding, denies LOF.   Fetal Movement: normal.       Obstetric HPI:  Obstetric History       T2      L2     SAB0   TAB0   Ectopic0   Multiple0   Live Births0       # Outcome Date GA Lbr Sunny/2nd Weight Sex Delivery Anes PTL Lv   3 Current            2 Term 11 40w0d  4.479 kg (9 lb 14 oz) M CS-Unspec EPI        Name: Agnieszka Rios Term 03 42w0d  3.515 kg (7 lb 12 oz) M CS-Unspec EPI N       Name: Will        Past Medical History:   Diagnosis Date    Diabetes mellitus     Diabetes mellitus type I     Irregular heartbeat      Past Surgical History:   Procedure Laterality Date     SECTION, LOW TRANSVERSE         PTA Medications   Medication Sig    acetaminophen (TYLENOL) 325 MG tablet Take 500 mg by mouth every 6 (six) hours as needed for Pain.    aspirin 81 MG Chew Take 1 tablet (81 mg total) by mouth once daily.    blood sugar diagnostic Strp Check glucose 8x/day    glucagon (human recombinant) inj 1mg/mL kit Follow package directions for low blood sugar.     "insulin aspart (NOVOLOG) 100 unit/mL InPn pen Inject 20 Units into the skin daily with breakfast. (Patient taking differently: Inject 16 Units into the skin daily with breakfast. )    insulin aspart (NOVOLOG) 100 unit/mL InPn pen Inject 22 Units into the skin with lunch. (Patient taking differently: Inject 20 Units into the skin with lunch. )    insulin aspart (NOVOLOG) 100 unit/mL InPn pen Inject 18 Units into the skin before dinner. (Patient taking differently: Inject 20 Units into the skin before dinner. )    insulin detemir (LEVEMIR FLEXTOUCH) 100 unit/mL (3 mL) SubQ InPn pen Inject 26 Units into the skin every evening. (Patient taking differently: Inject 46 Units into the skin every evening. As of 3/5/18: Levemir 20 units in am and 46 units QHS)    pen needle, diabetic (BD INSULIN PEN NEEDLE UF MINI) 31 gauge x 3/16" Ndle To use 5-6x/day with insulin injections.       Review of patient's allergies indicates:  No Known Allergies     Family History     Problem Relation (Age of Onset)    Diabetes Mother, Father    Hypertension Mother    No Known Problems Sister, Brother        Social History Main Topics    Smoking status: Never Smoker    Smokeless tobacco: Never Used    Alcohol use No    Drug use: No    Sexual activity: Yes     Partners: Male     Birth control/ protection: None     Review of Systems   Constitutional: Negative for fever.   Respiratory: Negative for cough and shortness of breath.    Cardiovascular: Negative for leg swelling.   Gastrointestinal: Negative for abdominal pain (contractions) and vomiting.   Genitourinary: Negative for vaginal bleeding and vaginal discharge.   Neurological: Negative for headaches.   Hematological: Does not bruise/bleed easily.      Objective:     Vital Signs (Most Recent):    Vital Signs (24h Range):  BP: (120)/(78) 120/78        There is no height or weight on file to calculate BMI.        Physical Exam:   Constitutional: She is oriented to person, place, and " time. She appears well-developed and well-nourished. No distress.    HENT:   Head: Normocephalic and atraumatic.      Cardiovascular: Normal rate and regular rhythm.     Pulmonary/Chest: Effort normal. No respiratory distress.        Abdominal: Soft. She exhibits no distension. There is no tenderness.   Gravid               Musculoskeletal: Normal range of motion. She exhibits no edema.       Neurological: She is alert and oriented to person, place, and time.    Skin: Skin is warm and dry.    Psychiatric: She has a normal mood and affect.       Cervix:  Deferred   Presentation: Vertex     Significant Labs:  Lab Results   Component Value Date    GROUPTRH A POS 10/16/2017    HEPBSAG Negative 2017       CBC: No results for input(s): WBC, RBC, HGB, HCT, PLT, MCV, MCH, MCHC in the last 48 hours.  CMP: No results for input(s): GLU, CALCIUM, ALBUMIN, PROT, NA, K, CO2, CL, BUN, CREATININE, ALKPHOS, ALT, AST, BILITOT in the last 48 hours.    Assessment/Plan:     37 y.o. female  at 34w5d for:    * Diabetes type 1, uncontrolled    - currently on levemir 20 in the AM and 46 at night  - taking novalog //20 with  Meals  - will continue home regimen, trend sugars and treat as indicated   - blood sugars over the past few days have been 130s to 290s  - will titrate blood sugars  - accuchecks with meals and fasting  - regular diabetic diet ordered   - CBC and CMP ordered         34 weeks gestation of pregnancy    - scan vertex  - consents signed for RLTCS and BTL  - 3T labs ordered               Ching Alvarado MD  Obstetrics  [facility]

## 2018-03-05 NOTE — SUBJECTIVE & OBJECTIVE
"Obstetric HPI:  Obstetric History       T2      L2     SAB0   TAB0   Ectopic0   Multiple0   Live Births0       # Outcome Date GA Lbr Sunny/2nd Weight Sex Delivery Anes PTL Lv   3 Current            2 Term 11 40w0d  4.479 kg (9 lb 14 oz) M CS-Unspec EPI        Name: Agnieszka Rios Term 03 42w0d  3.515 kg (7 lb 12 oz) M CS-Unspec EPI N       Name: Will        Past Medical History:   Diagnosis Date    Diabetes mellitus     Diabetes mellitus type I     Irregular heartbeat      Past Surgical History:   Procedure Laterality Date     SECTION, LOW TRANSVERSE         PTA Medications   Medication Sig    acetaminophen (TYLENOL) 325 MG tablet Take 500 mg by mouth every 6 (six) hours as needed for Pain.    aspirin 81 MG Chew Take 1 tablet (81 mg total) by mouth once daily.    blood sugar diagnostic Strp Check glucose 8x/day    glucagon (human recombinant) inj 1mg/mL kit Follow package directions for low blood sugar.    insulin aspart (NOVOLOG) 100 unit/mL InPn pen Inject 20 Units into the skin daily with breakfast. (Patient taking differently: Inject 16 Units into the skin daily with breakfast. )    insulin aspart (NOVOLOG) 100 unit/mL InPn pen Inject 22 Units into the skin with lunch. (Patient taking differently: Inject 20 Units into the skin with lunch. )    insulin aspart (NOVOLOG) 100 unit/mL InPn pen Inject 18 Units into the skin before dinner. (Patient taking differently: Inject 20 Units into the skin before dinner. )    insulin detemir (LEVEMIR FLEXTOUCH) 100 unit/mL (3 mL) SubQ InPn pen Inject 26 Units into the skin every evening. (Patient taking differently: Inject 46 Units into the skin every evening. As of 3/5/18: Levemir 20 units in am and 46 units QHS)    pen needle, diabetic (BD INSULIN PEN NEEDLE UF MINI) 31 gauge x 3/16" Ndle To use 5-6x/day with insulin injections.       Review of patient's allergies indicates:  No Known Allergies     Family History     Problem " Relation (Age of Onset)    Diabetes Mother, Father    Hypertension Mother    No Known Problems Sister, Brother        Social History Main Topics    Smoking status: Never Smoker    Smokeless tobacco: Never Used    Alcohol use No    Drug use: No    Sexual activity: Yes     Partners: Male     Birth control/ protection: None     Review of Systems   Constitutional: Negative for fever.   Respiratory: Negative for cough and shortness of breath.    Cardiovascular: Negative for leg swelling.   Gastrointestinal: Negative for abdominal pain (contractions) and vomiting.   Genitourinary: Negative for vaginal bleeding and vaginal discharge.   Neurological: Negative for headaches.   Hematological: Does not bruise/bleed easily.      Objective:     Vital Signs (Most Recent):    Vital Signs (24h Range):  BP: (120)/(78) 120/78        There is no height or weight on file to calculate BMI.        Physical Exam:   Constitutional: She is oriented to person, place, and time. She appears well-developed and well-nourished. No distress.    HENT:   Head: Normocephalic and atraumatic.      Cardiovascular: Normal rate and regular rhythm.     Pulmonary/Chest: Effort normal. No respiratory distress.        Abdominal: Soft. She exhibits no distension. There is no tenderness.   Gravid               Musculoskeletal: Normal range of motion. She exhibits no edema.       Neurological: She is alert and oriented to person, place, and time.    Skin: Skin is warm and dry.    Psychiatric: She has a normal mood and affect.       Cervix:  Deferred   Presentation: Vertex     Significant Labs:  Lab Results   Component Value Date    GROUPTRH A POS 10/16/2017    HEPBSAG Negative 09/18/2017       CBC: No results for input(s): WBC, RBC, HGB, HCT, PLT, MCV, MCH, MCHC in the last 48 hours.  CMP: No results for input(s): GLU, CALCIUM, ALBUMIN, PROT, NA, K, CO2, CL, BUN, CREATININE, ALKPHOS, ALT, AST, BILITOT in the last 48 hours.

## 2018-03-05 NOTE — PROGRESS NOTES
Obstetrical ultrasound completed today.  See report in imaging section of [facility].

## 2018-03-05 NOTE — LETTER
March 5, 2018      Nidhi Miller MD  [address]  [address]  [place] [address]           [facility]  [address]  [place] [address]  Phone: [phone]          Patient: Jimena Hazel   MR Number: [id number]   YOB: 1980   Date of Visit: 3/5/2018       Dear Dr. Nidhi Miller:    Thank you for referring Jimena Hazel to me for evaluation. Attached you will find relevant portions of my assessment and plan of care.    If you have questions, please do not hesitate to call me. I look forward to following Jimena Hazel along with you.    Sincerely,    Blair Henry MD    Enclosure  CC:  No Recipients    If you would like to receive this communication electronically, please contact [email or web address] or [phone] to request more information on [employer] access.    For providers and/or their staff who would like to refer a patient to [facility], please contact us through our one-stop-shop provider referral line, [facility], at [phone].    If you feel you have received this communication in error or would no longer like to receive these types of communications, please e-mail [email or web address]

## 2018-03-05 NOTE — ASSESSMENT & PLAN NOTE
- currently on levemir 20 in the AM and 46 at night  - taking novalog 18/20/20 with  Meals  - will continue home regimen, trend sugars and treat as indicated   - blood sugars over the past few days have been 130s to 290s  - will titrate blood sugars  - accuchecks with meals and fasting  - regular diabetic diet ordered   - CBC and CMP ordered

## 2018-03-05 NOTE — HOSPITAL COURSE
03/05/2018 Patient admitted to antepartum 2/2 to blood sugar patterning.   03/06/2018 Patient feeling well. Fasting  this AM after 46 levemir last night.  03/07/2018 BG fairly high yesterday. Insulin adjusted for today. Fasting BG 85 this AM. Patient has no complaints.  03/08/2018 Improved glucose control yesterday. Patient became symptomatic last night with a BG of 70. Received a glucose tab and improved. This AM, fasting 88. Minor adjustments today, but improvement encouraging for likely discharge to home today with new insulin regimen. Medically stable with plan for RTC in ~1 week for review of BG journal and RTC for prenatal care as scheduled.   03/09/2018 Overnight, patient felt like BG might be low around 0030. BG was 54. Had juice and recovered well. No other issues overnight. This AM no complaints. Desires discharge to home today if BG control is adequate.  03/10/2018 Fasting BG was 58 this AM. Adjusted regiment. Will continue to monitor.  03/12/2018 Fasting  this AM. Glucose control improved yesterday. No issues this AM. Anticipate discharge to home later today.

## 2018-03-05 NOTE — ANESTHESIA PREPROCEDURE EVALUATION
2018  [facility]  Anesthesia Pre-Operative Evaluation         Patient Name: Jimena Hazel  YOB: 1980  MRN: [id number]    SUBJECTIVE:     Pre-operative evaluation for * No surgery found *     2018    Jimena Hazel is a 37 y.o. female  w/ a significant PMHx of DM1 who presents for the above procedure.    LDA:   Right forearm 20g PIV    Prev airway: None documented.    Drips: None documented.    Patient Active Problem List   Diagnosis    Type 1 diabetes mellitus with hyperglycemia    Long-term current use of insulin for diabetes mellitus    Diabetes type 1, uncontrolled    Metabolic acidosis with normal anion gap and bicarbonate losses    Hypophosphatemia    Hypokalemia    Uncontrolled type 1 diabetes mellitus with hypoglycemia, with long-term current use of insulin    14 weeks gestation of pregnancy    Health education/counseling    Blood glucose elevated    Type 1 diabetes mellitus complicating pregnancy in second trimester, antepartum    Insulin controlled gestational diabetes mellitus (GDM) in third trimester    34 weeks gestation of pregnancy       Review of patient's allergies indicates:  No Known Allergies    Current Inpatient Medications:      No current facility-administered medications on file prior to encounter.      Current Outpatient Prescriptions on File Prior to Encounter   Medication Sig Dispense Refill    acetaminophen (TYLENOL) 325 MG tablet Take 500 mg by mouth every 6 (six) hours as needed for Pain.      aspirin 81 MG Chew Take 1 tablet (81 mg total) by mouth once daily.  0    blood sugar diagnostic Strp Check glucose 8x/day 300 strip 11    glucagon (human recombinant) inj 1mg/mL kit Follow package directions for low blood sugar. 1 kit 1    insulin aspart (NOVOLOG) 100 unit/mL InPn pen Inject 20 Units into the skin daily with  "breakfast. (Patient taking differently: Inject 16 Units into the skin daily with breakfast. ) 18 mL 3    insulin aspart (NOVOLOG) 100 unit/mL InPn pen Inject 22 Units into the skin with lunch. (Patient taking differently: Inject 20 Units into the skin with lunch. ) 19.8 mL 3    insulin aspart (NOVOLOG) 100 unit/mL InPn pen Inject 18 Units into the skin before dinner. (Patient taking differently: Inject 20 Units into the skin before dinner. ) 16.2 mL 3    insulin detemir (LEVEMIR FLEXTOUCH) 100 unit/mL (3 mL) SubQ InPn pen Inject 26 Units into the skin every evening. (Patient taking differently: Inject 46 Units into the skin every evening. As of 3/5/18: Levemir 20 units in am and 46 units QHS) 1 Box 3    pen needle, diabetic (BD INSULIN PEN NEEDLE UF MINI) 31 gauge x 3/16" Ndle To use 5-6x/day with insulin injections. 200 each 3       Past Surgical History:   Procedure Laterality Date     SECTION, LOW TRANSVERSE         Social History     Social History    Marital status:      Spouse name: N/A    Number of children: N/A    Years of education: N/A     Occupational History    Not on file.     Social History Main Topics    Smoking status: Never Smoker    Smokeless tobacco: Never Used    Alcohol use No    Drug use: No    Sexual activity: Yes     Partners: Male     Birth control/ protection: None     Other Topics Concern    Not on file     Social History Narrative    . Father's name Ladarius. Baby is a bot named Dmitriy.Mom also has another son, named Wlil,14 yr.       OBJECTIVE:     Vital Signs Range (Last 24H):  BP: (120)/(78)       CBC:   No results for input(s): WBC, RBC, HGB, HCT, PLT, MCV, MCH, MCHC in the last 72 hours.    CMP: No results for input(s): NA, K, CL, CO2, BUN, CREATININE, GLU, MG, PHOS, CALCIUM, ALBUMIN, PROT, ALKPHOS, ALT, AST, BILITOT in the last 72 hours.    INR:  No results for input(s): PT, INR, PROTIME, APTT in the last 72 hours.    Diagnostic Studies: No " relevant studies.    EKG: No recent studies available.    2D ECHO:  No results found for this or any previous visit.      ASSESSMENT/PLAN:         Anesthesia Evaluation    I have reviewed the Patient Summary Reports.     I have reviewed the Medications.     Review of Systems  Anesthesia Hx:  Denies Hx of Anesthetic complications  History of prior surgery of interest to airway management or planning: Previous anesthesia: Spinal Denies Family Hx of Anesthesia complications.   Denies Personal Hx of Anesthesia complications.   Social:  Non-Smoker    Hematology/Oncology:     Oncology Normal     EENT/Dental:EENT/Dental Normal   Cardiovascular:  Cardiovascular Normal Exercise tolerance: good  Denies CAD.           Pulmonary:  Pulmonary Normal  Denies COPD.  Denies Asthma.    Hepatic/GI:  Hepatic/GI Normal  Denies GERD.    Musculoskeletal:  Musculoskeletal Normal    Neurological:  Neurology Normal Denies TIA.  Denies CVA. Denies Seizures.    Endocrine:   Diabetes, type 1    Dermatological:  Skin Normal    Psych:  Psychiatric Normal           Physical Exam  General:  Well nourished, Obesity    Airway/Jaw/Neck:  Airway Findings: Mouth Opening: Normal Tongue: Normal  General Airway Assessment: Adult  Mallampati: III  Improves to II with phonation.  TM Distance: Normal, at least 6 cm  Jaw/Neck Findings:  Limited Ability to Prognath      Dental:  Dental Findings: In tact   Chest/Lungs:  Chest/Lungs Clear    Heart/Vascular:  Heart Findings: Normal       Mental Status:  Mental Status Findings:  Cooperative, Alert and Oriented         Anesthesia Plan  Type of Anesthesia, risks & benefits discussed:  Anesthesia Type:  spinal, MAC, general, epidural, CSE  Patient's Preference:   Intra-op Monitoring Plan: standard ASA monitors  Intra-op Monitoring Plan Comments:   Post Op Pain Control Plan: multimodal analgesia  Post Op Pain Control Plan Comments:   Induction:   IV  Beta Blocker:  Patient is not currently on a Beta-Blocker (No further  documentation required).       Informed Consent: Patient understands risks and agrees with Anesthesia plan.  Questions answered. Anesthesia consent signed with patient.  ASA Score: 3     Day of Surgery Review of History & Physical:            Ready For Surgery From Anesthesia Perspective.

## 2018-03-05 NOTE — HPI
Jimena Hazel is a 37 y.o. [id number] at 34w5d presents from [facility] clinic / to elevated blood sugars in her blood sugar log. Patient is a type 1 Dm, diagnosed 4 years ago, who is insulin dependent. Her regimen includes taking levemir 20 in the morning and 46 at night. She also takes novalog 18/20/20 with meals. Takes her blood sugars 4x a day. Denies polyuria, polydipsia. Denies feeling weak or fatigued. Denies any nausea or vomiting with meals.   This IUP is complicated by type 1 Dm, h/o of 2 prior LTCS, and unwanted fertility . Patient is scheduled to have a repeat LTCS and BTL between 37-38 weeks.   Patient denies contractions, denies vaginal bleeding, denies LOF.   Fetal Movement: normal.

## 2018-03-06 LAB
HIV 1+2 AB+HIV1 P24 AG SERPL QL IA: NEGATIVE
POCT GLUCOSE: 136 MG/DL (ref 70–110)
POCT GLUCOSE: 149 MG/DL (ref 70–110)
POCT GLUCOSE: 152 MG/DL (ref 70–110)
POCT GLUCOSE: 153 MG/DL (ref 70–110)
POCT GLUCOSE: 167 MG/DL (ref 70–110)
POCT GLUCOSE: 198 MG/DL (ref 70–110)
POCT GLUCOSE: 214 MG/DL (ref 70–110)
POCT GLUCOSE: 235 MG/DL (ref 70–110)
RPR SER QL: NORMAL

## 2018-03-06 PROCEDURE — 25000003 PHARM REV CODE 250: Performed by: STUDENT IN AN ORGANIZED HEALTH CARE EDUCATION/TRAINING PROGRAM

## 2018-03-06 PROCEDURE — G0378 HOSPITAL OBSERVATION PER HR: HCPCS

## 2018-03-06 PROCEDURE — 63600175 PHARM REV CODE 636 W HCPCS: Performed by: STUDENT IN AN ORGANIZED HEALTH CARE EDUCATION/TRAINING PROGRAM

## 2018-03-06 PROCEDURE — 63600175 PHARM REV CODE 636 W HCPCS: Performed by: OBSTETRICS & GYNECOLOGY

## 2018-03-06 PROCEDURE — 99220 PR INITIAL OBSERVATION CARE,LEVL III: CPT | Mod: 25,,, | Performed by: OBSTETRICS & GYNECOLOGY

## 2018-03-06 PROCEDURE — 59025 FETAL NON-STRESS TEST: CPT | Mod: 26,,, | Performed by: OBSTETRICS & GYNECOLOGY

## 2018-03-06 RX ORDER — INSULIN ASPART 100 [IU]/ML
22 INJECTION, SOLUTION INTRAVENOUS; SUBCUTANEOUS
Status: DISCONTINUED | OUTPATIENT
Start: 2018-03-07 | End: 2018-03-07

## 2018-03-06 RX ADMIN — PRENATAL VIT W/ FE FUMARATE-FA TAB 27-0.8 MG 1 TABLET: 27-0.8 TAB at 08:03

## 2018-03-06 RX ADMIN — INSULIN ASPART 2 UNITS: 100 INJECTION, SOLUTION INTRAVENOUS; SUBCUTANEOUS at 03:03

## 2018-03-06 RX ADMIN — INSULIN ASPART 4 UNITS: 100 INJECTION, SOLUTION INTRAVENOUS; SUBCUTANEOUS at 09:03

## 2018-03-06 RX ADMIN — INSULIN ASPART 20 UNITS: 100 INJECTION, SOLUTION INTRAVENOUS; SUBCUTANEOUS at 06:03

## 2018-03-06 RX ADMIN — INSULIN ASPART 20 UNITS: 100 INJECTION, SOLUTION INTRAVENOUS; SUBCUTANEOUS at 01:03

## 2018-03-06 RX ADMIN — INSULIN DETEMIR 50 UNITS: 100 INJECTION, SOLUTION SUBCUTANEOUS at 09:03

## 2018-03-06 RX ADMIN — INSULIN ASPART 4 UNITS: 100 INJECTION, SOLUTION INTRAVENOUS; SUBCUTANEOUS at 11:03

## 2018-03-06 RX ADMIN — INSULIN DETEMIR 20 UNITS: 100 INJECTION, SOLUTION SUBCUTANEOUS at 08:03

## 2018-03-06 RX ADMIN — INSULIN ASPART 2 UNITS: 100 INJECTION, SOLUTION INTRAVENOUS; SUBCUTANEOUS at 11:03

## 2018-03-06 RX ADMIN — ASPIRIN 81 MG CHEWABLE TABLET 81 MG: 81 TABLET CHEWABLE at 08:03

## 2018-03-06 RX ADMIN — INSULIN ASPART 18 UNITS: 100 INJECTION, SOLUTION INTRAVENOUS; SUBCUTANEOUS at 08:03

## 2018-03-06 NOTE — SUBJECTIVE & OBJECTIVE
Obstetric HPI:  HD #2: Patient feeling well. Fasting  this AM after 46 levemir last night.    Patient reports None contractions, active fetal movement, absent vaginal bleeding , absent loss of fluid      Objective:     Vital Signs (Most Recent):  Temp: 98 °F (36.7 °C) (03/06/18 0400)  Pulse: 74 (03/06/18 0400)  Resp: 18 (03/06/18 0400)  BP: 111/72 (03/06/18 0400)  SpO2: 100 % (03/06/18 0400) Vital Signs (24h Range):  Temp:  [97.8 °F (36.6 °C)-98 °F (36.7 °C)] 98 °F (36.7 °C)  Pulse:  [74-89] 74  Resp:  [18] 18  SpO2:  [100 %] 100 %  BP: (109-120)/(71-78) 111/72      There is no height or weight on file to calculate BMI.    PM NST 3/5  FHT: Cat 1 (reassuring) 135 bpm, + accels, - decels, moderate BTBV  TOCO: No ctx, no irritability    Intake/Output Summary (Last 24 hours) at 03/06/18 0722  Last data filed at 03/06/18 0600   Gross per 24 hour   Intake              900 ml   Output              420 ml   Net              480 ml     Cervical Exam: deferred     Significant Labs:  Recent Lab Results       03/06/18  0631 03/05/18  2004 03/05/18  1618 03/05/18  1617      Albumin    2.6(L)     Alkaline Phosphatase    111     ALT    13     Anion Gap    10     AST    18     Baso #    0.00     Basophil%    0.0     Total Bilirubin    0.2  Comment:  For infants and newborns, interpretation of results should be based  on gestational age, weight and in agreement with clinical  observations.  Premature Infant recommended reference ranges:  Up to 24 hours.............<8.0 mg/dL  Up to 48 hours............<12.0 mg/dL  3-5 days..................<15.0 mg/dL  6-29 days.................<15.0 mg/dL       BUN, Bld    9     Calcium    9.1     Chloride    106     CO2    22(L)     Creatinine    0.7     Differential Method    Automated     eGFR if     >60     eGFR if non     >60  Comment:  Calculation used to obtain the estimated glomerular filtration  rate (eGFR) is the CKD-EPI equation.        Eos #    0.0      Eosinophil%    0.2     Estimated Avg Glucose    143(H)     Glucose    102     Gran # (ANC)    4.9     Gran%    76.4(H)     Group & Rh   A POS      Hematocrit    33.2(L)     Hemoglobin    11.1(L)     Hemoglobin A1C    6.6  Comment:  According to ADA guidelines, hemoglobin A1c <7.0% represents  optimal control in non-pregnant diabetic patients. Different  metrics may apply to specific patient populations.   Standards of Medical Care in Diabetes-2016.  For the purpose of screening for the presence of diabetes:  <5.7%     Consistent with the absence of diabetes  5.7-6.4%  Consistent with increasing risk for diabetes   (prediabetes)  >or=6.5%  Consistent with diabetes  Currently, no consensus exists for use of hemoglobin A1c  for diagnosis of diabetes for children.  This Hemoglobin A1c assay has significant interference with fetal   hemoglobin   (HbF). The results are invalid for patients with abnormal amounts of   HbF,   including those with known Hereditary Persistence   of Fetal Hemoglobin. Heterozygous hemoglobin variants (HbAS, HbAC,   HbAD, HbAE, HbA2) do not significantly interfere with this assay;   however, presence of multiple variants in a sample may impact the %   interference.  (H)     INDIRECT BERNADETTE   NEG      Lymph #    1.1     Lymph%    17.1(L)     MCH    30.4     MCHC    33.4     MCV    91     Mono #    0.4     Mono%    5.8     MPV    10.3     Platelets    173     POCT Glucose 152(H) 118(H)       Potassium    3.8     Total Protein    6.9     RBC    3.65(L)     RDW    13.1     Sodium    138     WBC    6.36         Physical Exam:   Constitutional: She is oriented to person, place, and time. She appears well-developed and well-nourished. No distress.    HENT:   Head: Normocephalic and atraumatic.    Eyes: Conjunctivae and EOM are normal. Pupils are equal, round, and reactive to light.    Neck: Normal range of motion. Neck supple.    Cardiovascular: Normal rate and regular rhythm.     Pulmonary/Chest:  Effort normal. No respiratory distress.        Abdominal: Soft. She exhibits no distension. There is no tenderness.   Gravid, size appropriate for dates               Musculoskeletal: Normal range of motion. She exhibits no edema.       Neurological: She is alert and oriented to person, place, and time.    Skin: Skin is warm and dry.    Psychiatric: She has a normal mood and affect. Her behavior is normal. Judgment and thought content normal.

## 2018-03-06 NOTE — ASSESSMENT & PLAN NOTE
- currently on levemir 20/46; novalog 18/20/20 with  Meals  - started with home regimen, trending sugars and adjusting as indicated   - prior to admission, blood sugars were 130s to 290s; A1c 3/5 6.6  - accuchecks fasting and 2h post-prandial  - diabetic diet ordered   - CBC and CMP on admission were within acceptable ranges  - Fasting BG this

## 2018-03-06 NOTE — ASSESSMENT & PLAN NOTE
- US 3/5: cephalic, 3v cord, placenta posterior; BPP 8/8  - consents signed for RLTCS and BTL  - 3T labs pending  - NST BID

## 2018-03-06 NOTE — PROGRESS NOTES
2 hr post prandial breakfast glucose 214. 4 units of aspart given per sliding scale. Dr. Rubio notified.

## 2018-03-06 NOTE — PROGRESS NOTES
[facility]  Obstetrics  Antepartum Progress Note    Patient Name: Jimena Hazel  MRN: [id number]  Admission Date: 3/5/2018  Hospital Length of Stay: 1 days  Attending Physician: Blair Henry MD  Primary Care Provider: Levi Doyle MD    Subjective:     Principal Problem:Diabetes type 1, uncontrolled    HPI:  Jimena Hazel is a 37 y.o. [id number] at 34w5d presents from [facility] clinic  to elevated blood sugars in her blood sugar log. Patient is a type 1 Dm, diagnosed 4 years ago, who is insulin dependent. Her regimen includes taking levemir 20 in the morning and 46 at night. She also takes novalog 18/20/20 with meals. Takes her blood sugars 4x a day. Denies polyuria, polydipsia. Denies feeling weak or fatigued. Denies any nausea or vomiting with meals.   This IUP is complicated by type 1 Dm, h/o of 2 prior LTCS, and unwanted fertility . Patient is scheduled to have a repeat LTCS and BTL between 37-38 weeks.   Patient denies contractions, denies vaginal bleeding, denies LOF.   Fetal Movement: normal.       Hospital Course:  2018 Patient admitted to antepartum  to blood sugar patterning.   2018 Patient feeling well. Fasting  this AM after 46 levemir last night.    Obstetric HPI:  HD #2: Patient feeling well. Fasting  this AM after 46 levemir last night.    Patient reports None contractions, active fetal movement, absent vaginal bleeding , absent loss of fluid      Objective:     Vital Signs (Most Recent):  Temp: 98 °F (36.7 °C) (18 0400)  Pulse: 74 (18 0400)  Resp: 18 (18 0400)  BP: 111/72 (18 0400)  SpO2: 100 % (18 0400) Vital Signs (24h Range):  Temp:  [97.8 °F (36.6 °C)-98 °F (36.7 °C)] 98 °F (36.7 °C)  Pulse:  [74-89] 74  Resp:  [18] 18  SpO2:  [100 %] 100 %  BP: (109-120)/(71-78) 111/72      There is no height or weight on file to calculate BMI.    PM NST 3/5  FHT: Cat 1 (reassuring) 135 bpm, + accels, - decels, moderate BTBV  TOCO: No  ctx, no irritability    Intake/Output Summary (Last 24 hours) at 03/06/18 0722  Last data filed at 03/06/18 0600   Gross per 24 hour   Intake              900 ml   Output              420 ml   Net              480 ml     Cervical Exam: deferred     Significant Labs:  Recent Lab Results       03/06/18  0631 03/05/18 2004 03/05/18  1618 03/05/18  1617      Albumin    2.6(L)     Alkaline Phosphatase    111     ALT    13     Anion Gap    10     AST    18     Baso #    0.00     Basophil%    0.0     Total Bilirubin    0.2  Comment:  For infants and newborns, interpretation of results should be based  on gestational age, weight and in agreement with clinical  observations.  Premature Infant recommended reference ranges:  Up to 24 hours.............<8.0 mg/dL  Up to 48 hours............<12.0 mg/dL  3-5 days..................<15.0 mg/dL  6-29 days.................<15.0 mg/dL       BUN, Bld    9     Calcium    9.1     Chloride    106     CO2    22(L)     Creatinine    0.7     Differential Method    Automated     eGFR if     >60     eGFR if non     >60  Comment:  Calculation used to obtain the estimated glomerular filtration  rate (eGFR) is the CKD-EPI equation.        Eos #    0.0     Eosinophil%    0.2     Estimated Avg Glucose    143(H)     Glucose    102     Gran # (ANC)    4.9     Gran%    76.4(H)     Group & Rh   A POS      Hematocrit    33.2(L)     Hemoglobin    11.1(L)     Hemoglobin A1C    6.6  Comment:  According to ADA guidelines, hemoglobin A1c <7.0% represents  optimal control in non-pregnant diabetic patients. Different  metrics may apply to specific patient populations.   Standards of Medical Care in Diabetes-2016.  For the purpose of screening for the presence of diabetes:  <5.7%     Consistent with the absence of diabetes  5.7-6.4%  Consistent with increasing risk for diabetes   (prediabetes)  >or=6.5%  Consistent with diabetes  Currently, no consensus exists for use of  hemoglobin A1c  for diagnosis of diabetes for children.  This Hemoglobin A1c assay has significant interference with fetal   hemoglobin   (HbF). The results are invalid for patients with abnormal amounts of   HbF,   including those with known Hereditary Persistence   of Fetal Hemoglobin. Heterozygous hemoglobin variants (HbAS, HbAC,   HbAD, HbAE, HbA2) do not significantly interfere with this assay;   however, presence of multiple variants in a sample may impact the %   interference.  (H)     INDIRECT BERNADETTE   NEG      Lymph #    1.1     Lymph%    17.1(L)     MCH    30.4     MCHC    33.4     MCV    91     Mono #    0.4     Mono%    5.8     MPV    10.3     Platelets    173     POCT Glucose 152(H) 118(H)       Potassium    3.8     Total Protein    6.9     RBC    3.65(L)     RDW    13.1     Sodium    138     WBC    6.36         Physical Exam:   Constitutional: She is oriented to person, place, and time. She appears well-developed and well-nourished. No distress.    HENT:   Head: Normocephalic and atraumatic.    Eyes: Conjunctivae and EOM are normal. Pupils are equal, round, and reactive to light.    Neck: Normal range of motion. Neck supple.    Cardiovascular: Normal rate and regular rhythm.     Pulmonary/Chest: Effort normal. No respiratory distress.        Abdominal: Soft. She exhibits no distension. There is no tenderness.   Gravid, size appropriate for dates               Musculoskeletal: Normal range of motion. She exhibits no edema.       Neurological: She is alert and oriented to person, place, and time.    Skin: Skin is warm and dry.    Psychiatric: She has a normal mood and affect. Her behavior is normal. Judgment and thought content normal.       Assessment/Plan:     37 y.o. female  at 34w6d for:    * Diabetes type 1, uncontrolled    - currently on levemir ; novalog  with  Meals  - started with home regimen, trending sugars and adjusting as indicated   - prior to admission, blood sugars  were 130s to 290s; A1c 3/5 6.6  - accuchecks fasting and 2h post-prandial  - diabetic diet ordered   - CBC and CMP on admission were within acceptable ranges  - Fasting BG this         34 weeks gestation of pregnancy    - US 3/5: cephalic, 3v cord, placenta posterior; BPP 8/8  - consents signed for RLTCS and BTL  - 3T labs pending  - NST BID            Tiffany Rubio MD  Obstetrics  [facility]    I have seen and examined the patient and agree with the resident note. Patient without complaints. Has noted increased fasting BS at home. Here fasting increased this am-PM Levemir increased to 50.  moderate variability, reactive, no decelerations. Plan to pattern blood sugars and adjust insulin. Delivery per Dr. Henry planned for 37 weeks. Afebrile. VSS. Tocos quiet. Glucose normal on admit. AG reported as normal. With correction for albumin still within range on CMP. Will monitor closely.

## 2018-03-07 LAB
POCT GLUCOSE: 111 MG/DL (ref 70–110)
POCT GLUCOSE: 112 MG/DL (ref 70–110)
POCT GLUCOSE: 133 MG/DL (ref 70–110)
POCT GLUCOSE: 160 MG/DL (ref 70–110)
POCT GLUCOSE: 70 MG/DL (ref 70–110)
POCT GLUCOSE: 85 MG/DL (ref 70–110)
POCT GLUCOSE: 89 MG/DL (ref 70–110)
POCT GLUCOSE: 92 MG/DL (ref 70–110)

## 2018-03-07 PROCEDURE — 25000003 PHARM REV CODE 250: Performed by: STUDENT IN AN ORGANIZED HEALTH CARE EDUCATION/TRAINING PROGRAM

## 2018-03-07 PROCEDURE — 99232 SBSQ HOSP IP/OBS MODERATE 35: CPT | Mod: ,,, | Performed by: OBSTETRICS & GYNECOLOGY

## 2018-03-07 PROCEDURE — 63600175 PHARM REV CODE 636 W HCPCS: Performed by: STUDENT IN AN ORGANIZED HEALTH CARE EDUCATION/TRAINING PROGRAM

## 2018-03-07 PROCEDURE — 11000001 HC ACUTE MED/SURG PRIVATE ROOM

## 2018-03-07 PROCEDURE — 59025 FETAL NON-STRESS TEST: CPT

## 2018-03-07 RX ORDER — INSULIN ASPART 100 [IU]/ML
30 INJECTION, SOLUTION INTRAVENOUS; SUBCUTANEOUS
Status: DISCONTINUED | OUTPATIENT
Start: 2018-03-07 | End: 2018-03-08

## 2018-03-07 RX ORDER — INSULIN ASPART 100 [IU]/ML
26 INJECTION, SOLUTION INTRAVENOUS; SUBCUTANEOUS
Status: DISCONTINUED | OUTPATIENT
Start: 2018-03-07 | End: 2018-03-07

## 2018-03-07 RX ORDER — INSULIN ASPART 100 [IU]/ML
22 INJECTION, SOLUTION INTRAVENOUS; SUBCUTANEOUS
Status: DISCONTINUED | OUTPATIENT
Start: 2018-03-07 | End: 2018-03-07

## 2018-03-07 RX ORDER — INSULIN ASPART 100 [IU]/ML
26 INJECTION, SOLUTION INTRAVENOUS; SUBCUTANEOUS
Status: DISCONTINUED | OUTPATIENT
Start: 2018-03-07 | End: 2018-03-08

## 2018-03-07 RX ORDER — INSULIN ASPART 100 [IU]/ML
28 INJECTION, SOLUTION INTRAVENOUS; SUBCUTANEOUS
Status: DISCONTINUED | OUTPATIENT
Start: 2018-03-08 | End: 2018-03-08

## 2018-03-07 RX ADMIN — INSULIN ASPART 26 UNITS: 100 INJECTION, SOLUTION INTRAVENOUS; SUBCUTANEOUS at 12:03

## 2018-03-07 RX ADMIN — INSULIN DETEMIR 50 UNITS: 100 INJECTION, SOLUTION SUBCUTANEOUS at 08:03

## 2018-03-07 RX ADMIN — INSULIN DETEMIR 24 UNITS: 100 INJECTION, SOLUTION SUBCUTANEOUS at 09:03

## 2018-03-07 RX ADMIN — INSULIN ASPART 30 UNITS: 100 INJECTION, SOLUTION INTRAVENOUS; SUBCUTANEOUS at 06:03

## 2018-03-07 RX ADMIN — PRENATAL VIT W/ FE FUMARATE-FA TAB 27-0.8 MG 1 TABLET: 27-0.8 TAB at 09:03

## 2018-03-07 RX ADMIN — ASPIRIN 81 MG CHEWABLE TABLET 81 MG: 81 TABLET CHEWABLE at 09:03

## 2018-03-07 RX ADMIN — INSULIN ASPART 22 UNITS: 100 INJECTION, SOLUTION INTRAVENOUS; SUBCUTANEOUS at 08:03

## 2018-03-07 RX ADMIN — INSULIN ASPART 2 UNITS: 100 INJECTION, SOLUTION INTRAVENOUS; SUBCUTANEOUS at 02:03

## 2018-03-07 NOTE — PLAN OF CARE
Problem: Patient Care Overview  Goal: Plan of Care Review  Outcome: Ongoing (interventions implemented as appropriate)  No significant events throughout the night. VSS. BS controlled with insulin. Denies ctx, LOF, or vaginal bleeding. Confirms + fetal movement. Plan of care reviewed with patient and spouse. All questions and concerns answered at this time. Call light in place. Safe and free from falls with side rails up x 2. Will continue to monitor.

## 2018-03-07 NOTE — PROGRESS NOTES
[facility]  Obstetrics  Antepartum Progress Note    Patient Name: Jimena Hazel  MRN: [id number]  Admission Date: 3/5/2018  Hospital Length of Stay: 1 days  Attending Physician: Blair Henry MD  Primary Care Provider: Levi Doyle MD    Subjective:     Principal Problem:Diabetes type 1, uncontrolled    HPI:  Jimena Hazel is a 37 y.o. [id number] at 34w5d presents from [facility] clinic  to elevated blood sugars in her blood sugar log. Patient is a type 1 Dm, diagnosed 4 years ago, who is insulin dependent. Her regimen includes taking levemir 20 in the morning and 46 at night. She also takes novalog //20 with meals. Takes her blood sugars 4x a day. Denies polyuria, polydipsia. Denies feeling weak or fatigued. Denies any nausea or vomiting with meals.   This IUP is complicated by type 1 Dm, h/o of 2 prior LTCS, and unwanted fertility . Patient is scheduled to have a repeat LTCS and BTL between 37-38 weeks.   Patient denies contractions, denies vaginal bleeding, denies LOF.   Fetal Movement: normal.       Hospital Course:  2018 Patient admitted to antepartum  to blood sugar patterning.   2018 Patient feeling well. Fasting  this AM after 46 levemir last night.  2018 BG fairly high yesterday. Insulin adjusted for today. Fasting BG 85 this AM. Patient has no complaints.    Obstetric HPI:  BG fairly high yesterday. Insulin adjusted for today. Fasting BG 85 this AM. Patient has no complaints.    Patient reports None contractions, active fetal movement, absent vaginal bleeding , absent loss of fluid      Objective:     Vital Signs (Most Recent):  Temp: 97.2 °F (36.2 °C) (18)  Pulse: 79 (18)  Resp: 18 (18)  BP: 101/62 (18)  SpO2: 100 % (18) Vital Signs (24h Range):  Temp:  [97.2 °F (36.2 °C)-98.6 °F (37 °C)] 97.2 °F (36.2 °C)  Pulse:  [74-87] 79  Resp:  [16-18] 18  SpO2:  [99 %-100 %] 100 %  BP: (101-117)/(62-74) 101/62      Weight: 97.5 kg (215 lb)  Body mass index is 35.78 kg/m².    PM FHT 3/6: Cat 1 (reassuring) 120 bpm, + accels, - decels, moderate BTBV  TOCO: occasional ctx with modetrate irritability    Intake/Output Summary (Last 24 hours) at 18 0651  Last data filed at 18 0600   Gross per 24 hour   Intake              500 ml   Output              480 ml   Net               20 ml     Cervical Exam: deferred     Significant Labs:  Recent Lab Results       18  0602 18  2323 18  2104 18  1840 18  1539      POCT Glucose 85 198(H) 235(H) 136(H) 167(H)                 18  1335 18  1130 18  0851      POCT Glucose 149(H) 214(H) 153(H)         Physical Exam:   Constitutional: She is oriented to person, place, and time. She appears well-developed and well-nourished. No distress.    HENT:   Head: Normocephalic and atraumatic.    Eyes: Conjunctivae and EOM are normal. Pupils are equal, round, and reactive to light.    Neck: Normal range of motion. Neck supple.    Cardiovascular: Normal rate and regular rhythm.     Pulmonary/Chest: Effort normal. No respiratory distress.        Abdominal: Soft. She exhibits no distension. There is no tenderness.   Gravid, size appropriate for dates       Genitourinary:   Genitourinary Comments: deferred           Musculoskeletal: Normal range of motion. She exhibits no edema.       Neurological: She is alert and oriented to person, place, and time.    Skin: Skin is warm and dry.    Psychiatric: She has a normal mood and affect. Her behavior is normal. Judgment and thought content normal.       Assessment/Plan:     37 y.o. female  at 35w0d for:    * Diabetes type 1, uncontrolled    - currently on levemir ; novalog  with meals  - started with home regimen, trending sugars and adjusting as indicated   - prior to admission, blood sugars were 130s to 290s; A1c 3/5 6.6  - accuchecks fasting and 2h post-prandial  - diabetic diet  ordered   - CBC and CMP on admission were within acceptable ranges  - Fasting BG this AM 85  - insulin aspart has been adjusted        34 weeks gestation of pregnancy    - US 3/5: cephalic, 3v cord, placenta posterior; BPP 8/8  - consents signed for RLTCS and BTL  - 3T labs pending  - NST BID            Tiffany Rubio MD  Obstetrics  [facility]

## 2018-03-07 NOTE — NURSING
Dr. Loja called and wanted a repeat BS on pt. Pt.'s BS was 198. Dr. Loja notified and ordered to give 2 units of aspart.

## 2018-03-07 NOTE — ASSESSMENT & PLAN NOTE
- currently on levemir 20/50; novalog 22/22/26 with meals  - started with home regimen, trending sugars and adjusting as indicated   - prior to admission, blood sugars were 130s to 290s; A1c 3/5 6.6  - accuchecks fasting and 2h post-prandial  - diabetic diet ordered   - CBC and CMP on admission were within acceptable ranges  - Fasting BG this AM 85  - insulin aspart has been adjusted

## 2018-03-07 NOTE — SUBJECTIVE & OBJECTIVE
Obstetric HPI:  BG fairly high yesterday. Insulin adjusted for today. Fasting BG 85 this AM. Patient has no complaints.    Patient reports None contractions, active fetal movement, absent vaginal bleeding , absent loss of fluid      Objective:     Vital Signs (Most Recent):  Temp: 97.2 °F (36.2 °C) (03/07/18 0601)  Pulse: 79 (03/07/18 0601)  Resp: 18 (03/07/18 0601)  BP: 101/62 (03/07/18 0601)  SpO2: 100 % (03/07/18 0601) Vital Signs (24h Range):  Temp:  [97.2 °F (36.2 °C)-98.6 °F (37 °C)] 97.2 °F (36.2 °C)  Pulse:  [74-87] 79  Resp:  [16-18] 18  SpO2:  [99 %-100 %] 100 %  BP: (101-117)/(62-74) 101/62     Weight: 97.5 kg (215 lb)  Body mass index is 35.78 kg/m².    PM FHT 3/6: Cat 1 (reassuring) 120 bpm, + accels, - decels, moderate BTBV  TOCO: occasional ctx with modetrate irritability    Intake/Output Summary (Last 24 hours) at 03/07/18 0651  Last data filed at 03/07/18 0600   Gross per 24 hour   Intake              500 ml   Output              480 ml   Net               20 ml     Cervical Exam: deferred     Significant Labs:  Recent Lab Results       03/07/18  0602 03/06/18  2323 03/06/18  2104 03/06/18  1840 03/06/18  1539      POCT Glucose 85 198(H) 235(H) 136(H) 167(H)                 03/06/18  1335 03/06/18  1130 03/06/18  0851      POCT Glucose 149(H) 214(H) 153(H)         Physical Exam:   Constitutional: She is oriented to person, place, and time. She appears well-developed and well-nourished. No distress.    HENT:   Head: Normocephalic and atraumatic.    Eyes: Conjunctivae and EOM are normal. Pupils are equal, round, and reactive to light.    Neck: Normal range of motion. Neck supple.    Cardiovascular: Normal rate and regular rhythm.     Pulmonary/Chest: Effort normal. No respiratory distress.        Abdominal: Soft. She exhibits no distension. There is no tenderness.   Gravid, size appropriate for dates       Genitourinary:   Genitourinary Comments: deferred           Musculoskeletal: Normal range of  motion. She exhibits no edema.       Neurological: She is alert and oriented to person, place, and time.    Skin: Skin is warm and dry.    Psychiatric: She has a normal mood and affect. Her behavior is normal. Judgment and thought content normal.

## 2018-03-08 LAB
POCT GLUCOSE: 118 MG/DL (ref 70–110)
POCT GLUCOSE: 129 MG/DL (ref 70–110)
POCT GLUCOSE: 193 MG/DL (ref 70–110)
POCT GLUCOSE: 59 MG/DL (ref 70–110)
POCT GLUCOSE: 60 MG/DL (ref 70–110)
POCT GLUCOSE: 85 MG/DL (ref 70–110)
POCT GLUCOSE: 88 MG/DL (ref 70–110)

## 2018-03-08 PROCEDURE — 11000001 HC ACUTE MED/SURG PRIVATE ROOM

## 2018-03-08 PROCEDURE — 59025 FETAL NON-STRESS TEST: CPT | Mod: 26,,, | Performed by: OBSTETRICS & GYNECOLOGY

## 2018-03-08 PROCEDURE — 25000003 PHARM REV CODE 250: Performed by: STUDENT IN AN ORGANIZED HEALTH CARE EDUCATION/TRAINING PROGRAM

## 2018-03-08 PROCEDURE — 99231 SBSQ HOSP IP/OBS SF/LOW 25: CPT | Mod: 25,,, | Performed by: OBSTETRICS & GYNECOLOGY

## 2018-03-08 RX ORDER — INSULIN ASPART 100 [IU]/ML
24 INJECTION, SOLUTION INTRAVENOUS; SUBCUTANEOUS
Status: DISCONTINUED | OUTPATIENT
Start: 2018-03-08 | End: 2018-03-10

## 2018-03-08 RX ORDER — INSULIN ASPART 100 [IU]/ML
28 INJECTION, SOLUTION INTRAVENOUS; SUBCUTANEOUS
Status: DISCONTINUED | OUTPATIENT
Start: 2018-03-08 | End: 2018-03-10

## 2018-03-08 RX ORDER — INSULIN ASPART 100 [IU]/ML
30 INJECTION, SOLUTION INTRAVENOUS; SUBCUTANEOUS
Status: DISCONTINUED | OUTPATIENT
Start: 2018-03-08 | End: 2018-03-09

## 2018-03-08 RX ADMIN — INSULIN ASPART 24 UNITS: 100 INJECTION, SOLUTION INTRAVENOUS; SUBCUTANEOUS at 08:03

## 2018-03-08 RX ADMIN — ASPIRIN 81 MG CHEWABLE TABLET 81 MG: 81 TABLET CHEWABLE at 09:03

## 2018-03-08 RX ADMIN — INSULIN ASPART 30 UNITS: 100 INJECTION, SOLUTION INTRAVENOUS; SUBCUTANEOUS at 01:03

## 2018-03-08 RX ADMIN — PRENATAL VIT W/ FE FUMARATE-FA TAB 27-0.8 MG 1 TABLET: 27-0.8 TAB at 09:03

## 2018-03-08 RX ADMIN — INSULIN DETEMIR 50 UNITS: 100 INJECTION, SOLUTION SUBCUTANEOUS at 09:03

## 2018-03-08 RX ADMIN — INSULIN DETEMIR 24 UNITS: 100 INJECTION, SOLUTION SUBCUTANEOUS at 09:03

## 2018-03-08 RX ADMIN — INSULIN ASPART 28 UNITS: 100 INJECTION, SOLUTION INTRAVENOUS; SUBCUTANEOUS at 07:03

## 2018-03-08 RX ADMIN — Medication 16 G: at 12:03

## 2018-03-08 RX ADMIN — INSULIN ASPART 2 UNITS: 100 INJECTION, SOLUTION INTRAVENOUS; SUBCUTANEOUS at 08:03

## 2018-03-08 NOTE — PROGRESS NOTES
CHARISSEG 61, pt asymptomatic, lunch served and at bedside, Dr Waller notified of JAMEEL Smith MD stated to give scheduled insulin and stressed to pt to eat all of her lunch  tray.

## 2018-03-08 NOTE — PLAN OF CARE
Problem: Patient Care Overview  Goal: Plan of Care Review  Plan reviewed and pt verbalizes understanding. VS stable and pt remains afebrile this shift. +FM per pt. Denies pain, ctx, LOF, or vaginal bleeding. At 2256 pt c/o feeling as if BG was dropping. BG checked= 70. Dr. Torre notified and instructed RN to give pt 16g sugar tabs. Pt reported feeling fine after this.  at bedside. Belongings and call light within reach. No questions or concerns at this time. Will continue to monitor.

## 2018-03-08 NOTE — SUBJECTIVE & OBJECTIVE
Obstetric HPI:  Improved glucose control yesterday. Patient became symptomatic last night with a BG of 70. Received a glucose tab and improved. This AM, fasting 88. Minor adjustments today, but improvement encouraging for likely discharge to home today with new insulin regimen. Medically stable with plan for RTC in ~1 week for review of BG journal and RTC for prenatal care as scheduled.     Patient reports None contractions, active fetal movement, absent vaginal bleeding , absent loss of fluid      Objective:     Vital Signs (Most Recent):  Temp: 98.6 °F (37 °C) (03/08/18 0344)  Pulse: 86 (03/08/18 0344)  Resp: 16 (03/08/18 0344)  BP: 104/61 (03/08/18 0344)  SpO2: 98 % (03/08/18 0344) Vital Signs (24h Range):  Temp:  [96.8 °F (36 °C)-98.6 °F (37 °C)] 98.6 °F (37 °C)  Pulse:  [77-90] 86  Resp:  [16-19] 16  SpO2:  [94 %-100 %] 98 %  BP: (104-119)/(61-78) 104/61     Weight: 97.5 kg (215 lb)  Body mass index is 35.78 kg/m².    PM 3/7 FHT: Cat 1 (reassuring) 140 bpm, + accels, - decels, moderate BTBV  TOCO: No ctx, minimal irritability    Intake/Output Summary (Last 24 hours) at 03/08/18 0650  Last data filed at 03/07/18 1500   Gross per 24 hour   Intake              620 ml   Output              360 ml   Net              260 ml     Significant Labs:  Recent Lab Results       03/08/18  0605 03/07/18  2356 03/07/18  2025 03/07/18  1758 03/07/18  1431      POCT Glucose 88 70 111(H) 92 160(H)                 03/07/18  1216 03/07/18  1044 03/07/18  0821      POCT Glucose 112(H) 133(H) 89         Physical Exam:   Constitutional: She is oriented to person, place, and time. She appears well-developed and well-nourished. No distress.    HENT:   Head: Normocephalic and atraumatic.    Eyes: Conjunctivae and EOM are normal. Pupils are equal, round, and reactive to light.    Neck: Normal range of motion. Neck supple.    Cardiovascular: Normal rate and regular rhythm.     Pulmonary/Chest: Effort normal. No respiratory distress.         Abdominal: Soft. She exhibits no distension. There is no tenderness.   Gravid, size appropriate for dates       Genitourinary:   Genitourinary Comments: deferred           Musculoskeletal: Normal range of motion. She exhibits no edema.       Neurological: She is alert and oriented to person, place, and time.    Skin: Skin is warm and dry.    Psychiatric: She has a normal mood and affect. Her behavior is normal. Judgment and thought content normal.

## 2018-03-08 NOTE — PROGRESS NOTES
[facility]  Obstetrics  Antepartum Progress Note    Patient Name: Jimena Hazel  MRN: [id number]  Admission Date: 3/5/2018  Hospital Length of Stay: 2 days  Attending Physician: Blair Henry MD  Primary Care Provider: Levi Doyle MD    Subjective:     Principal Problem:Diabetes type 1, uncontrolled    HPI:  Jimena Hazel is a 37 y.o. [id number] at 34w5d presents from [facility] clinic  to elevated blood sugars in her blood sugar log. Patient is a type 1 Dm, diagnosed 4 years ago, who is insulin dependent. Her regimen includes taking levemir 20 in the morning and 46 at night. She also takes novalog 18/20/20 with meals. Takes her blood sugars 4x a day. Denies polyuria, polydipsia. Denies feeling weak or fatigued. Denies any nausea or vomiting with meals.   This IUP is complicated by type 1 Dm, h/o of 2 prior LTCS, and unwanted fertility . Patient is scheduled to have a repeat LTCS and BTL between 37-38 weeks.   Patient denies contractions, denies vaginal bleeding, denies LOF.   Fetal Movement: normal.       Hospital Course:  2018 Patient admitted to antepartum  to blood sugar patterning.   2018 Patient feeling well. Fasting  this AM after 46 levemir last night.  2018 BG fairly high yesterday. Insulin adjusted for today. Fasting BG 85 this AM. Patient has no complaints.  2018 Improved glucose control yesterday. Patient became symptomatic last night with a BG of 70. Received a glucose tab and improved. This AM, fasting 88. Minor adjustments today, but improvement encouraging for likely discharge to home today with new insulin regimen. Medically stable with plan for RTC in ~1 week for review of BG journal and RTC for prenatal care as scheduled.     Obstetric HPI:  Improved glucose control yesterday. Patient became symptomatic last night with a BG of 70. Received a glucose tab and improved. This AM, fasting 88. Minor adjustments today, but improvement encouraging for  likely discharge to home today with new insulin regimen. Medically stable with plan for RTC in ~1 week for review of BG journal and RTC for prenatal care as scheduled.     Patient reports None contractions, active fetal movement, absent vaginal bleeding , absent loss of fluid      Objective:     Vital Signs (Most Recent):  Temp: 98.6 °F (37 °C) (03/08/18 0344)  Pulse: 86 (03/08/18 0344)  Resp: 16 (03/08/18 0344)  BP: 104/61 (03/08/18 0344)  SpO2: 98 % (03/08/18 0344) Vital Signs (24h Range):  Temp:  [96.8 °F (36 °C)-98.6 °F (37 °C)] 98.6 °F (37 °C)  Pulse:  [77-90] 86  Resp:  [16-19] 16  SpO2:  [94 %-100 %] 98 %  BP: (104-119)/(61-78) 104/61     Weight: 97.5 kg (215 lb)  Body mass index is 35.78 kg/m².    PM 3/7 FHT: Cat 1 (reassuring) 140 bpm, + accels, - decels, moderate BTBV  TOCO: No ctx, minimal irritability    Intake/Output Summary (Last 24 hours) at 03/08/18 0650  Last data filed at 03/07/18 1500   Gross per 24 hour   Intake              620 ml   Output              360 ml   Net              260 ml     Significant Labs:  Recent Lab Results       03/08/18  0605 03/07/18  2356 03/07/18  2025 03/07/18  1758 03/07/18  1431      POCT Glucose 88 70 111(H) 92 160(H)                 03/07/18  1216 03/07/18  1044 03/07/18  0821      POCT Glucose 112(H) 133(H) 89         Physical Exam:   Constitutional: She is oriented to person, place, and time. She appears well-developed and well-nourished. No distress.    HENT:   Head: Normocephalic and atraumatic.    Eyes: Conjunctivae and EOM are normal. Pupils are equal, round, and reactive to light.    Neck: Normal range of motion. Neck supple.    Cardiovascular: Normal rate and regular rhythm.     Pulmonary/Chest: Effort normal. No respiratory distress.        Abdominal: Soft. She exhibits no distension. There is no tenderness.   Gravid, size appropriate for dates       Genitourinary:   Genitourinary Comments: deferred           Musculoskeletal: Normal range of motion. She  exhibits no edema.       Neurological: She is alert and oriented to person, place, and time.    Skin: Skin is warm and dry.    Psychiatric: She has a normal mood and affect. Her behavior is normal. Judgment and thought content normal.       Assessment/Plan:     37 y.o. female  at 35w1d for:    * Diabetes type 1, uncontrolled    - currently on levemir ; novalog  with meals  - started with home regimen, trending sugars and adjusting as indicated   - prior to admission, blood sugars were 130s to 290s; A1c 3/5 6.6  - accuchecks fasting and 2h post-prandial  - diabetic diet ordered   - CBC and CMP on admission were within acceptable ranges  - Fasting BG this AM 88  - insulin aspart and levemir were adjusted        34 weeks gestation of pregnancy    - US 3/5: cephalic, 3v cord, placenta posterior; BPP   - consents signed for RLTCS and BTL  - NST BID            Tiffany Rubio MD  Obstetrics  [facility]

## 2018-03-09 LAB
POCT GLUCOSE: 103 MG/DL (ref 70–110)
POCT GLUCOSE: 123 MG/DL (ref 70–110)
POCT GLUCOSE: 168 MG/DL (ref 70–110)
POCT GLUCOSE: 52 MG/DL (ref 70–110)
POCT GLUCOSE: 53 MG/DL (ref 70–110)
POCT GLUCOSE: 72 MG/DL (ref 70–110)

## 2018-03-09 PROCEDURE — 25000003 PHARM REV CODE 250: Performed by: STUDENT IN AN ORGANIZED HEALTH CARE EDUCATION/TRAINING PROGRAM

## 2018-03-09 PROCEDURE — 11000001 HC ACUTE MED/SURG PRIVATE ROOM

## 2018-03-09 PROCEDURE — 59025 FETAL NON-STRESS TEST: CPT | Mod: 26,,, | Performed by: OBSTETRICS & GYNECOLOGY

## 2018-03-09 PROCEDURE — 99233 SBSQ HOSP IP/OBS HIGH 50: CPT | Mod: 25,,, | Performed by: OBSTETRICS & GYNECOLOGY

## 2018-03-09 RX ORDER — INSULIN ASPART 100 [IU]/ML
28 INJECTION, SOLUTION INTRAVENOUS; SUBCUTANEOUS
Status: DISCONTINUED | OUTPATIENT
Start: 2018-03-09 | End: 2018-03-12 | Stop reason: HOSPADM

## 2018-03-09 RX ADMIN — INSULIN ASPART 28 UNITS: 100 INJECTION, SOLUTION INTRAVENOUS; SUBCUTANEOUS at 01:03

## 2018-03-09 RX ADMIN — INSULIN DETEMIR 50 UNITS: 100 INJECTION, SOLUTION SUBCUTANEOUS at 10:03

## 2018-03-09 RX ADMIN — INSULIN ASPART 28 UNITS: 100 INJECTION, SOLUTION INTRAVENOUS; SUBCUTANEOUS at 06:03

## 2018-03-09 RX ADMIN — INSULIN ASPART 24 UNITS: 100 INJECTION, SOLUTION INTRAVENOUS; SUBCUTANEOUS at 09:03

## 2018-03-09 RX ADMIN — INSULIN ASPART 2 UNITS: 100 INJECTION, SOLUTION INTRAVENOUS; SUBCUTANEOUS at 11:03

## 2018-03-09 RX ADMIN — ASPIRIN 81 MG CHEWABLE TABLET 81 MG: 81 TABLET CHEWABLE at 09:03

## 2018-03-09 RX ADMIN — PRENATAL VIT W/ FE FUMARATE-FA TAB 27-0.8 MG 1 TABLET: 27-0.8 TAB at 09:03

## 2018-03-09 NOTE — PROGRESS NOTES
Pt. Feels that her blood sugar has dropped. BG @ 0027 is 53. Dr. Torre notified. Pt to receive juice or milk per Dr. Torre's request. Will continue to monitor.

## 2018-03-09 NOTE — SUBJECTIVE & OBJECTIVE
Obstetric HPI:  Overnight, patient felt like BG might be low around 0030. BG was 54. Had juice and recovered well. No other issues overnight. This AM no complaints. Desires discharge to home today if BG control is adequate.    Patient reports None contractions, active fetal movement, absent vaginal bleeding , absent loss of fluid      Objective:     Vital Signs (Most Recent):  Temp: 96.7 °F (35.9 °C) (03/09/18 0409)  Pulse: 77 (03/09/18 0409)  Resp: 20 (03/09/18 0033)  BP: 103/66 (03/09/18 0409)  SpO2: 100 % (03/08/18 1951) Vital Signs (24h Range):  Temp:  [96.7 °F (35.9 °C)-97.8 °F (36.6 °C)] 96.7 °F (35.9 °C)  Pulse:  [] 77  Resp:  [16-20] 20  SpO2:  [96 %-100 %] 100 %  BP: (102-117)/(60-76) 103/66     Weight: 97.5 kg (215 lb)  Body mass index is 35.78 kg/m².    FHT PM 3/8: Cat 1 (reassuring) 135 bpm, + accels, - decels, moderate BTBV  TOCO: No ctx or irritability    Intake/Output Summary (Last 24 hours) at 03/09/18 0650  Last data filed at 03/09/18 0600   Gross per 24 hour   Intake             1140 ml   Output              300 ml   Net              840 ml     Cervical Exam: deferred     Significant Labs:  Recent Lab Results       03/09/18  0601 03/09/18  0027 03/08/18  2131 03/08/18  1859 03/08/18  1531      POCT Glucose 72 53(L) 85 60(L) 118(H)                 03/08/18  1309 03/08/18  1046 03/08/18  0804      POCT Glucose 59(L) 129(H) 193(H)         Physical Exam:   Constitutional: She is oriented to person, place, and time. She appears well-developed and well-nourished. No distress.    HENT:   Head: Normocephalic and atraumatic.    Eyes: Conjunctivae and EOM are normal. Pupils are equal, round, and reactive to light.    Neck: Normal range of motion. Neck supple.    Cardiovascular: Normal rate and regular rhythm.     Pulmonary/Chest: Effort normal. No respiratory distress.        Abdominal: Soft. She exhibits no distension. There is no tenderness.   Gravid, size appropriate for dates       Genitourinary:    Genitourinary Comments: deferred           Musculoskeletal: Normal range of motion. She exhibits no edema.       Neurological: She is alert and oriented to person, place, and time.    Skin: Skin is warm and dry.    Psychiatric: She has a normal mood and affect. Her behavior is normal. Judgment and thought content normal.

## 2018-03-09 NOTE — PROGRESS NOTES
[facility]  Obstetrics  Antepartum Progress Note    Patient Name: Jimena Hazel  MRN: [id number]  Admission Date: 3/5/2018  Hospital Length of Stay: 3 days  Attending Physician: Blair Henry MD  Primary Care Provider: Levi Doyle MD    Subjective:     Principal Problem:Diabetes type 1, uncontrolled    HPI:  Jimena Hazel is a 37 y.o. [id number] at 34w5d presents from [facility] clinic  to elevated blood sugars in her blood sugar log. Patient is a type 1 Dm, diagnosed 4 years ago, who is insulin dependent. Her regimen includes taking levemir 20 in the morning and 46 at night. She also takes novalog 18/20/20 with meals. Takes her blood sugars 4x a day. Denies polyuria, polydipsia. Denies feeling weak or fatigued. Denies any nausea or vomiting with meals.   This IUP is complicated by type 1 Dm, h/o of 2 prior LTCS, and unwanted fertility . Patient is scheduled to have a repeat LTCS and BTL between 37-38 weeks.   Patient denies contractions, denies vaginal bleeding, denies LOF.   Fetal Movement: normal.       Hospital Course:  2018 Patient admitted to antepartum  to blood sugar patterning.   2018 Patient feeling well. Fasting  this AM after 46 levemir last night.  2018 BG fairly high yesterday. Insulin adjusted for today. Fasting BG 85 this AM. Patient has no complaints.  2018 Improved glucose control yesterday. Patient became symptomatic last night with a BG of 70. Received a glucose tab and improved. This AM, fasting 88. Minor adjustments today, but improvement encouraging for likely discharge to home today with new insulin regimen. Medically stable with plan for RTC in ~1 week for review of BG journal and RTC for prenatal care as scheduled.   2018 Overnight, patient felt like BG might be low around 0030. BG was 54. Had juice and recovered well. No other issues overnight. This AM no complaints. Desires discharge to home today if BG control is  adequate.    Obstetric HPI:  Overnight, patient felt like BG might be low around 0030. BG was 54. Had juice and recovered well. No other issues overnight. This AM no complaints. Desires discharge to home today if BG control is adequate.    Patient reports None contractions, active fetal movement, absent vaginal bleeding , absent loss of fluid      Objective:     Vital Signs (Most Recent):  Temp: 96.7 °F (35.9 °C) (03/09/18 0409)  Pulse: 77 (03/09/18 0409)  Resp: 20 (03/09/18 0033)  BP: 103/66 (03/09/18 0409)  SpO2: 100 % (03/08/18 1951) Vital Signs (24h Range):  Temp:  [96.7 °F (35.9 °C)-97.8 °F (36.6 °C)] 96.7 °F (35.9 °C)  Pulse:  [] 77  Resp:  [16-20] 20  SpO2:  [96 %-100 %] 100 %  BP: (102-117)/(60-76) 103/66     Weight: 97.5 kg (215 lb)  Body mass index is 35.78 kg/m².    FHT PM 3/8: Cat 1 (reassuring) 135 bpm, + accels, - decels, moderate BTBV  TOCO: No ctx or irritability    Intake/Output Summary (Last 24 hours) at 03/09/18 0650  Last data filed at 03/09/18 0600   Gross per 24 hour   Intake             1140 ml   Output              300 ml   Net              840 ml     Cervical Exam: deferred     Significant Labs:  Recent Lab Results       03/09/18  0601 03/09/18  0027 03/08/18  2131 03/08/18  1859 03/08/18  1531      POCT Glucose 72 53(L) 85 60(L) 118(H)                 03/08/18  1309 03/08/18  1046 03/08/18  0804      POCT Glucose 59(L) 129(H) 193(H)         Physical Exam:   Constitutional: She is oriented to person, place, and time. She appears well-developed and well-nourished. No distress.    HENT:   Head: Normocephalic and atraumatic.    Eyes: Conjunctivae and EOM are normal. Pupils are equal, round, and reactive to light.    Neck: Normal range of motion. Neck supple.    Cardiovascular: Normal rate and regular rhythm.     Pulmonary/Chest: Effort normal. No respiratory distress.        Abdominal: Soft. She exhibits no distension. There is no tenderness.   Gravid, size appropriate for dates        Genitourinary:   Genitourinary Comments: deferred           Musculoskeletal: Normal range of motion. She exhibits no edema.       Neurological: She is alert and oriented to person, place, and time.    Skin: Skin is warm and dry.    Psychiatric: She has a normal mood and affect. Her behavior is normal. Judgment and thought content normal.       Assessment/Plan:     37 y.o. female  at 35w2d for:    * Diabetes type 1, uncontrolled    - currently on levemir ; novalog  with meals  - started with home regimen, trending sugars and adjusting as indicated   - prior to admission, blood sugars were 130s to 290s; A1c 3/5 6.6  - accuchecks fasting and 2h post-prandial  - diabetic diet ordered   - CBC and CMP on admission were within acceptable ranges  - Fasting BG this AM 72  - insulin aspart has been adjusted for the day        34 weeks gestation of pregnancy    - US 3/5: cephalic, 3v cord, placenta posterior; BPP   - consents signed for RLTCS and BTL  - 3T labs pending  - NST BID            Tiffany Rubio MD  Obstetrics  [facility]

## 2018-03-09 NOTE — ASSESSMENT & PLAN NOTE
- currently on levemir 22/50; novalog 24/28/28 with meals  - started with home regimen, trending sugars and adjusting as indicated   - prior to admission, blood sugars were 130s to 290s; A1c 3/5 6.6  - accuchecks fasting and 2h post-prandial  - diabetic diet ordered   - CBC and CMP on admission were within acceptable ranges  - Fasting BG this AM 72  - insulin aspart has been adjusted for the day

## 2018-03-09 NOTE — PLAN OF CARE
Problem: Patient Care Overview  Goal: Plan of Care Review  Outcome: Ongoing (interventions implemented as appropriate)  Pt doing well. No significant changes this shift. Vital signs remain stable. Fasting blood glucose = 72 at 0602 am, Dr. Rubio notified. Pt denies contractions, LOF, and contractions at this time.  FOB remains at bedside, attentive to patient and her needs. POC reviewed with patient and spouse. No questions at this time. Will continue to monitor.

## 2018-03-09 NOTE — PROGRESS NOTES
Maribel Brown RN  Diabetic Educator notified of pt's possible discharge today and needs of consult. Message left to call back

## 2018-03-10 LAB
POCT GLUCOSE: 124 MG/DL (ref 70–110)
POCT GLUCOSE: 169 MG/DL (ref 70–110)
POCT GLUCOSE: 50 MG/DL (ref 70–110)
POCT GLUCOSE: 58 MG/DL (ref 70–110)
POCT GLUCOSE: 58 MG/DL (ref 70–110)
POCT GLUCOSE: 90 MG/DL (ref 70–110)
POCT GLUCOSE: 96 MG/DL (ref 70–110)
POCT GLUCOSE: 98 MG/DL (ref 70–110)

## 2018-03-10 PROCEDURE — 63600175 PHARM REV CODE 636 W HCPCS: Performed by: STUDENT IN AN ORGANIZED HEALTH CARE EDUCATION/TRAINING PROGRAM

## 2018-03-10 PROCEDURE — 11000001 HC ACUTE MED/SURG PRIVATE ROOM

## 2018-03-10 PROCEDURE — 25000003 PHARM REV CODE 250: Performed by: STUDENT IN AN ORGANIZED HEALTH CARE EDUCATION/TRAINING PROGRAM

## 2018-03-10 PROCEDURE — 59025 FETAL NON-STRESS TEST: CPT | Mod: 26,,, | Performed by: OBSTETRICS & GYNECOLOGY

## 2018-03-10 PROCEDURE — 99233 SBSQ HOSP IP/OBS HIGH 50: CPT | Mod: 25,,, | Performed by: OBSTETRICS & GYNECOLOGY

## 2018-03-10 RX ORDER — INSULIN ASPART 100 [IU]/ML
26 INJECTION, SOLUTION INTRAVENOUS; SUBCUTANEOUS
Status: DISCONTINUED | OUTPATIENT
Start: 2018-03-10 | End: 2018-03-11

## 2018-03-10 RX ORDER — INSULIN ASPART 100 [IU]/ML
26 INJECTION, SOLUTION INTRAVENOUS; SUBCUTANEOUS
Status: DISCONTINUED | OUTPATIENT
Start: 2018-03-11 | End: 2018-03-10

## 2018-03-10 RX ORDER — ACETAMINOPHEN 325 MG/1
650 TABLET ORAL ONCE
Status: COMPLETED | OUTPATIENT
Start: 2018-03-10 | End: 2018-03-10

## 2018-03-10 RX ADMIN — ASPIRIN 81 MG CHEWABLE TABLET 81 MG: 81 TABLET CHEWABLE at 09:03

## 2018-03-10 RX ADMIN — INSULIN ASPART 28 UNITS: 100 INJECTION, SOLUTION INTRAVENOUS; SUBCUTANEOUS at 01:03

## 2018-03-10 RX ADMIN — INSULIN ASPART 26 UNITS: 100 INJECTION, SOLUTION INTRAVENOUS; SUBCUTANEOUS at 09:03

## 2018-03-10 RX ADMIN — ACETAMINOPHEN 650 MG: 325 TABLET ORAL at 08:03

## 2018-03-10 RX ADMIN — PRENATAL VIT W/ FE FUMARATE-FA TAB 27-0.8 MG 1 TABLET: 27-0.8 TAB at 09:03

## 2018-03-10 RX ADMIN — INSULIN DETEMIR 48 UNITS: 100 INJECTION, SOLUTION SUBCUTANEOUS at 09:03

## 2018-03-10 NOTE — SUBJECTIVE & OBJECTIVE
Obstetric HPI:  Fasting BG 58 this AM. Feeling fine on my exam. + FM, denies VB, denies LOF, denies contractions.       Objective:     Vital Signs (Most Recent):  Temp: 97.8 °F (36.6 °C) (03/10/18 1147)  Pulse: 76 (03/10/18 1147)  Resp: 18 (03/10/18 1147)  BP: 107/63 (03/10/18 1147)  SpO2: 99 % (03/10/18 1147) Vital Signs (24h Range):  Temp:  [97.3 °F (36.3 °C)-98 °F (36.7 °C)] 97.8 °F (36.6 °C)  Pulse:  [76-92] 76  Resp:  [16-20] 18  SpO2:  [99 %] 99 %  BP: (107-120)/(63-74) 107/63     Weight: 97.5 kg (215 lb)  Body mass index is 35.78 kg/m².    FHT: 125 +acels, no decels, mod BTBV Cat 1 (reassuring)  TOCO:  one ctx in 20 minutes    No intake or output data in the 24 hours ending 03/10/18 1219    Cervical Exam:deferred     Significant Labs:  Recent Lab Results       03/10/18  1146 03/10/18  0909 03/10/18  0625 03/10/18  0138 03/10/18  0048      POCT Glucose 169(H) 96 58(L) 98 58(L)                 03/09/18  2122 03/09/18  1820 03/09/18  1552      POCT Glucose 103 52(L) 123(H)           Physical Exam:   Constitutional: She is oriented to person, place, and time. She appears well-developed and well-nourished. No distress.    HENT:   Head: Normocephalic and atraumatic.     Neck: Normal range of motion. Neck supple.    Cardiovascular: Normal rate and regular rhythm.     Pulmonary/Chest: Effort normal and breath sounds normal.        Abdominal: Soft. She exhibits no distension. There is no tenderness. There is no guarding.   gravid             Musculoskeletal: She exhibits no edema or tenderness.       Neurological: She is alert and oriented to person, place, and time.    Skin: Skin is warm and dry.    Psychiatric: She has a normal mood and affect. Her behavior is normal.

## 2018-03-10 NOTE — NURSING
FBS=58, pt given juice to drink. Pt denies any symptoms. Healy aware. Pt will order breakfast@ 0730 and recheck 2hr PP as ordered per Destiny.

## 2018-03-10 NOTE — PROGRESS NOTES
[facility]  Obstetrics  Antepartum Progress Note    Patient Name: Jimena Hazel  MRN: [id number]  Admission Date: 3/5/2018  Hospital Length of Stay: 4 days  Attending Physician: Blair Henry MD  Primary Care Provider: Levi Doyle MD    Subjective:     Principal Problem:Diabetes type 1, uncontrolled    HPI:  Jimena Hazel is a 37 y.o. [id number] at 34w5d presents from [facility] clinic  to elevated blood sugars in her blood sugar log. Patient is a type 1 Dm, diagnosed 4 years ago, who is insulin dependent. Her regimen includes taking levemir 20 in the morning and 46 at night. She also takes novalog 18/20/20 with meals. Takes her blood sugars 4x a day. Denies polyuria, polydipsia. Denies feeling weak or fatigued. Denies any nausea or vomiting with meals.   This IUP is complicated by type 1 Dm, h/o of 2 prior LTCS, and unwanted fertility . Patient is scheduled to have a repeat LTCS and BTL between 37-38 weeks.   Patient denies contractions, denies vaginal bleeding, denies LOF.   Fetal Movement: normal.       Hospital Course:  2018 Patient admitted to antepartum  to blood sugar patterning.   2018 Patient feeling well. Fasting  this AM after 46 levemir last night.  2018 BG fairly high yesterday. Insulin adjusted for today. Fasting BG 85 this AM. Patient has no complaints.  2018 Improved glucose control yesterday. Patient became symptomatic last night with a BG of 70. Received a glucose tab and improved. This AM, fasting 88. Minor adjustments today, but improvement encouraging for likely discharge to home today with new insulin regimen. Medically stable with plan for RTC in ~1 week for review of BG journal and RTC for prenatal care as scheduled.   2018 Overnight, patient felt like BG might be low around 0030. BG was 54. Had juice and recovered well. No other issues overnight. This AM no complaints. Desires discharge to home today if BG control is  adequate.  03/10/2018 Fasting BG was 58 this AM. Adjusted regiment. Will continue to monitor.    Obstetric HPI:  Fasting BG 58 this AM. Feeling fine on my exam. + FM, denies VB, denies LOF, denies contractions.       Objective:     Vital Signs (Most Recent):  Temp: 97.8 °F (36.6 °C) (03/10/18 1147)  Pulse: 76 (03/10/18 1147)  Resp: 18 (03/10/18 1147)  BP: 107/63 (03/10/18 1147)  SpO2: 99 % (03/10/18 1147) Vital Signs (24h Range):  Temp:  [97.3 °F (36.3 °C)-98 °F (36.7 °C)] 97.8 °F (36.6 °C)  Pulse:  [76-92] 76  Resp:  [16-20] 18  SpO2:  [99 %] 99 %  BP: (107-120)/(63-74) 107/63     Weight: 97.5 kg (215 lb)  Body mass index is 35.78 kg/m².    FHT: 125 +acels, no decels, mod BTBV Cat 1 (reassuring)  TOCO:  one ctx in 20 minutes    No intake or output data in the 24 hours ending 03/10/18 1219    Cervical Exam:deferred     Significant Labs:  Recent Lab Results       03/10/18  1146 03/10/18  0909 03/10/18  0625 03/10/18  0138 03/10/18  0048      POCT Glucose 169(H) 96 58(L) 98 58(L)                 18  2122 18  1820 18  1552      POCT Glucose 103 52(L) 123(H)           Physical Exam:   Constitutional: She is oriented to person, place, and time. She appears well-developed and well-nourished. No distress.    HENT:   Head: Normocephalic and atraumatic.     Neck: Normal range of motion. Neck supple.    Cardiovascular: Normal rate and regular rhythm.     Pulmonary/Chest: Effort normal and breath sounds normal.        Abdominal: Soft. She exhibits no distension. There is no tenderness. There is no guarding.   gravid             Musculoskeletal: She exhibits no edema or tenderness.       Neurological: She is alert and oriented to person, place, and time.    Skin: Skin is warm and dry.    Psychiatric: She has a normal mood and affect. Her behavior is normal.       Assessment/Plan:     37 y.o. female  at 35w3d for:    * Diabetes type 1, uncontrolled    - currently on levemir ; novalog  with  meals - for today  - started with home regimen, trending sugars and adjusting as indicated   - prior to admission, blood sugars were 130s to 290s; A1c 3/5 6.6  - accuchecks fasting and 2h post-prandial  - diabetic diet ordered   - CBC and CMP on admission were within acceptable ranges  - Fasting BG this AM 58  - insulin aspart has been adjusted for the day        34 weeks gestation of pregnancy    - US 3/5: cephalic, 3v cord, placenta posterior; BPP 8/8  - consents signed for RLTCS and BTL  - 3T labs pending  - NST BID                Briana Boyd MD  Obstetrics  [facility]

## 2018-03-10 NOTE — NURSING
RN called to Bs, pt states that she feels like her blood sugar is getting low, Accucheck performed. BS =58. Apple juice and manjit crackers given. Will repeat BS after snack. Troy notified

## 2018-03-10 NOTE — PROGRESS NOTES
Pt called out stating she felt shaky, like her blood sugar was low, accu check done, BS= 52. Pt requesting one apple juice. Dr richard notified. Pt dinner tray is on the way, dr richard instructed to still give dinner dose of regular insulin 28units as ordered.

## 2018-03-10 NOTE — ASSESSMENT & PLAN NOTE
- currently on levemir 22/48; novalog 26/28/26 with meals - for today  - started with home regimen, trending sugars and adjusting as indicated   - prior to admission, blood sugars were 130s to 290s; A1c 3/5 6.6  - accuchecks fasting and 2h post-prandial  - diabetic diet ordered   - CBC and CMP on admission were within acceptable ranges  - Fasting BG this AM 58  - insulin aspart has been adjusted for the day

## 2018-03-11 LAB
POCT GLUCOSE: 114 MG/DL (ref 70–110)
POCT GLUCOSE: 145 MG/DL (ref 70–110)
POCT GLUCOSE: 181 MG/DL (ref 70–110)
POCT GLUCOSE: 184 MG/DL (ref 70–110)
POCT GLUCOSE: 54 MG/DL (ref 70–110)
POCT GLUCOSE: 65 MG/DL (ref 70–110)
POCT GLUCOSE: 74 MG/DL (ref 70–110)
POCT GLUCOSE: 96 MG/DL (ref 70–110)

## 2018-03-11 PROCEDURE — 25000003 PHARM REV CODE 250: Performed by: STUDENT IN AN ORGANIZED HEALTH CARE EDUCATION/TRAINING PROGRAM

## 2018-03-11 PROCEDURE — 25000003 PHARM REV CODE 250: Performed by: OBSTETRICS & GYNECOLOGY

## 2018-03-11 PROCEDURE — 63600175 PHARM REV CODE 636 W HCPCS: Performed by: OBSTETRICS & GYNECOLOGY

## 2018-03-11 PROCEDURE — 59025 FETAL NON-STRESS TEST: CPT | Mod: 26,,, | Performed by: OBSTETRICS & GYNECOLOGY

## 2018-03-11 PROCEDURE — 63600175 PHARM REV CODE 636 W HCPCS: Performed by: STUDENT IN AN ORGANIZED HEALTH CARE EDUCATION/TRAINING PROGRAM

## 2018-03-11 PROCEDURE — 99233 SBSQ HOSP IP/OBS HIGH 50: CPT | Mod: 25,,, | Performed by: OBSTETRICS & GYNECOLOGY

## 2018-03-11 PROCEDURE — 11000001 HC ACUTE MED/SURG PRIVATE ROOM

## 2018-03-11 RX ORDER — INSULIN ASPART 100 [IU]/ML
20 INJECTION, SOLUTION INTRAVENOUS; SUBCUTANEOUS
Status: DISCONTINUED | OUTPATIENT
Start: 2018-03-11 | End: 2018-03-11

## 2018-03-11 RX ORDER — INSULIN ASPART 100 [IU]/ML
20 INJECTION, SOLUTION INTRAVENOUS; SUBCUTANEOUS
Status: DISCONTINUED | OUTPATIENT
Start: 2018-03-12 | End: 2018-03-11

## 2018-03-11 RX ORDER — ACETAMINOPHEN 325 MG/1
650 TABLET ORAL ONCE
Status: COMPLETED | OUTPATIENT
Start: 2018-03-11 | End: 2018-03-11

## 2018-03-11 RX ORDER — INSULIN ASPART 100 [IU]/ML
16 INJECTION, SOLUTION INTRAVENOUS; SUBCUTANEOUS
Status: DISCONTINUED | OUTPATIENT
Start: 2018-03-12 | End: 2018-03-12 | Stop reason: HOSPADM

## 2018-03-11 RX ORDER — INSULIN ASPART 100 [IU]/ML
28 INJECTION, SOLUTION INTRAVENOUS; SUBCUTANEOUS
Status: DISCONTINUED | OUTPATIENT
Start: 2018-03-11 | End: 2018-03-12 | Stop reason: HOSPADM

## 2018-03-11 RX ORDER — INSULIN ASPART 100 [IU]/ML
24 INJECTION, SOLUTION INTRAVENOUS; SUBCUTANEOUS
Status: DISCONTINUED | OUTPATIENT
Start: 2018-03-11 | End: 2018-03-11

## 2018-03-11 RX ADMIN — INSULIN ASPART 28 UNITS: 100 INJECTION, SOLUTION INTRAVENOUS; SUBCUTANEOUS at 01:03

## 2018-03-11 RX ADMIN — PRENATAL VIT W/ FE FUMARATE-FA TAB 27-0.8 MG 1 TABLET: 27-0.8 TAB at 08:03

## 2018-03-11 RX ADMIN — ASPIRIN 81 MG CHEWABLE TABLET 81 MG: 81 TABLET CHEWABLE at 08:03

## 2018-03-11 RX ADMIN — INSULIN DETEMIR 48 UNITS: 100 INJECTION, SOLUTION SUBCUTANEOUS at 09:03

## 2018-03-11 RX ADMIN — INSULIN ASPART 28 UNITS: 100 INJECTION, SOLUTION INTRAVENOUS; SUBCUTANEOUS at 09:03

## 2018-03-11 RX ADMIN — INSULIN ASPART 20 UNITS: 100 INJECTION, SOLUTION INTRAVENOUS; SUBCUTANEOUS at 07:03

## 2018-03-11 RX ADMIN — INSULIN ASPART 2 UNITS: 100 INJECTION, SOLUTION INTRAVENOUS; SUBCUTANEOUS at 06:03

## 2018-03-11 RX ADMIN — ACETAMINOPHEN 650 MG: 325 TABLET ORAL at 08:03

## 2018-03-11 NOTE — SUBJECTIVE & OBJECTIVE
Obstetric HPI:  No complaints. + FM, denies VB, denies LOF, denies contractions.        Objective:     Vital Signs (Most Recent):  Temp: 97.6 °F (36.4 °C) (03/11/18 0005)  Pulse: 82 (03/11/18 0005)  Resp: 18 (03/11/18 0005)  BP: 102/64 (03/11/18 0005)  SpO2: 100 % (03/11/18 0005) Vital Signs (24h Range):  Temp:  [97 °F (36.1 °C)-98 °F (36.7 °C)] 97.6 °F (36.4 °C)  Pulse:  [76-91] 82  Resp:  [16-18] 18  SpO2:  [99 %-100 %] 100 %  BP: (102-120)/(63-77) 102/64     Weight: 97.5 kg (215 lb)  Body mass index is 35.78 kg/m².    FHT: 135 Cat 1 (reassuring)  TOCO:  irregular ctx      Intake/Output Summary (Last 24 hours) at 03/11/18 0312  Last data filed at 03/10/18 1900   Gross per 24 hour   Intake             1370 ml   Output              470 ml   Net              900 ml       Cervical Exam: deferred     Significant Labs:  Recent Lab Results       03/11/18  0135 03/10/18  2101 03/10/18  1532 03/10/18  1322 03/10/18  1146      POCT Glucose 184(H) 50(LL) 90 124(H) 169(H)                 03/10/18  0909 03/10/18  0625      POCT Glucose 96 58(L)           Physical Exam:   Constitutional: She is oriented to person, place, and time. She appears well-developed and well-nourished. No distress.    HENT:   Head: Normocephalic and atraumatic.     Neck: Normal range of motion. Neck supple.    Cardiovascular: Normal rate and regular rhythm.     Pulmonary/Chest: Effort normal and breath sounds normal.        Abdominal: Soft. She exhibits no distension. There is no tenderness. There is no guarding.   Gravid, non-tender             Musculoskeletal: She exhibits no edema or tenderness.       Neurological: She is alert and oriented to person, place, and time.    Skin: Skin is warm and dry.    Psychiatric: She has a normal mood and affect. Her behavior is normal.

## 2018-03-11 NOTE — PROGRESS NOTES
[facility]  Obstetrics  Antepartum Progress Note    Patient Name: Jimena Hazel  MRN: [id number]  Admission Date: 3/5/2018  Hospital Length of Stay: 5 days  Attending Physician: Blair Henry MD  Primary Care Provider: Leiv Doyle MD    Subjective:     Principal Problem:Diabetes type 1, uncontrolled    HPI:  Jimena Hazel is a 37 y.o. [id number] at 34w5d presents from [facility] clinic  to elevated blood sugars in her blood sugar log. Patient is a type 1 Dm, diagnosed 4 years ago, who is insulin dependent. Her regimen includes taking levemir 20 in the morning and 46 at night. She also takes novalog 18/20/20 with meals. Takes her blood sugars 4x a day. Denies polyuria, polydipsia. Denies feeling weak or fatigued. Denies any nausea or vomiting with meals.   This IUP is complicated by type 1 Dm, h/o of 2 prior LTCS, and unwanted fertility . Patient is scheduled to have a repeat LTCS and BTL between 37-38 weeks.   Patient denies contractions, denies vaginal bleeding, denies LOF.   Fetal Movement: normal.       Hospital Course:  2018 Patient admitted to antepartum  to blood sugar patterning.   2018 Patient feeling well. Fasting  this AM after 46 levemir last night.  2018 BG fairly high yesterday. Insulin adjusted for today. Fasting BG 85 this AM. Patient has no complaints.  2018 Improved glucose control yesterday. Patient became symptomatic last night with a BG of 70. Received a glucose tab and improved. This AM, fasting 88. Minor adjustments today, but improvement encouraging for likely discharge to home today with new insulin regimen. Medically stable with plan for RTC in ~1 week for review of BG journal and RTC for prenatal care as scheduled.   2018 Overnight, patient felt like BG might be low around 0030. BG was 54. Had juice and recovered well. No other issues overnight. This AM no complaints. Desires discharge to home today if BG control is  adequate.  03/10/2018 Fasting BG was 58 this AM. Adjusted regiment. Will continue to monitor.    Obstetric HPI:  No complaints. + FM, denies VB, denies LOF, denies contractions.        Objective:     Vital Signs (Most Recent):  Temp: 97.6 °F (36.4 °C) (18 0005)  Pulse: 82 (18 0005)  Resp: 18 (18 0005)  BP: 102/64 (18 0005)  SpO2: 100 % (18 0005) Vital Signs (24h Range):  Temp:  [97 °F (36.1 °C)-98 °F (36.7 °C)] 97.6 °F (36.4 °C)  Pulse:  [76-91] 82  Resp:  [16-18] 18  SpO2:  [99 %-100 %] 100 %  BP: (102-120)/(63-77) 102/64     Weight: 97.5 kg (215 lb)  Body mass index is 35.78 kg/m².    FHT: 135 Cat 1 (reassuring)  TOCO:  irregular ctx      Intake/Output Summary (Last 24 hours) at 18 0312  Last data filed at 03/10/18 1900   Gross per 24 hour   Intake             1370 ml   Output              470 ml   Net              900 ml       Cervical Exam: deferred     Significant Labs:  Recent Lab Results       18  0135 03/10/18  2101 03/10/18  1532 03/10/18  1322 03/10/18  1146      POCT Glucose 184(H) 50(LL) 90 124(H) 169(H)                 03/10/18  0909 03/10/18  0625      POCT Glucose 96 58(L)           Physical Exam:   Constitutional: She is oriented to person, place, and time. She appears well-developed and well-nourished. No distress.    HENT:   Head: Normocephalic and atraumatic.     Neck: Normal range of motion. Neck supple.    Cardiovascular: Normal rate and regular rhythm.     Pulmonary/Chest: Effort normal and breath sounds normal.        Abdominal: Soft. She exhibits no distension. There is no tenderness. There is no guarding.   Gravid, non-tender             Musculoskeletal: She exhibits no edema or tenderness.       Neurological: She is alert and oriented to person, place, and time.    Skin: Skin is warm and dry.    Psychiatric: She has a normal mood and affect. Her behavior is normal.       Assessment/Plan:     37 y.o. female  at 35w4d for:    * Diabetes type 1,  uncontrolled    - currently on levemir ; novalog  with meals - for today - started with home regimen, trending sugars and adjusting as indicated   - prior to admission, blood sugars were 130s to 290s; A1c 3/5 6.6  - accuchecks fasting and 2h post-prandial  - carb counting breakfast/71g lunch/67 g dinner/ - turkey sandwich snack at bedtime, possibly ate out of vending machine at midnight  - diabetic diet ordered   - CBC and CMP on admission were within acceptable ranges  - Fasting BG this   - insulin aspart has been adjusted for the day        34 weeks gestation of pregnancy    - US 3/5: cephalic, 3v cord, placenta posterior; BPP   - consents signed for RLTCS and BTL  - NST BID                Briana Boyd MD  Obstetrics  [facility]

## 2018-03-11 NOTE — NURSING
2100 Dr. Sushma was called told of pt. 2 hr. PP of 50, and nightly Levemir is due. Dr. Sushma stated to give pt. A snack and give nightly dose of insulin.

## 2018-03-11 NOTE — ASSESSMENT & PLAN NOTE
- currently on levemir ; novalog  with meals - for today - started with home regimen, trending sugars and adjusting as indicated   - prior to admission, blood sugars were 130s to 290s; A1c 3/5 6.6  - accuchecks fasting and 2h post-prandial  - carb counting breakfast/71g lunch/67 g dinner/ - turkey sandwich snack at bedtime, possibly ate out of vending machine at midnight  - diabetic diet ordered   - CBC and CMP on admission were within acceptable ranges  - Fasting BG this   - insulin aspart has been adjusted for the day

## 2018-03-11 NOTE — ASSESSMENT & PLAN NOTE
- US 3/5: cephalic, 3v cord, placenta posterior; BPP 8/8  - consents signed for RLTCS and BTL  - NST BID

## 2018-03-11 NOTE — PROGRESS NOTES
"At bedside to evaluate patient secondary to ctx noted on monitor. Patient reports feeling mild "pressure" with contractions, no pain. Denies LOF, vaginal bleeding. Reports active FM.     Temp:  [97.1 °F (36.2 °C)-97.6 °F (36.4 °C)] 97.4 °F (36.3 °C)  Pulse:  [80-94] 94  Resp:  [16-18] 18  SpO2:  [98 %-100 %] 98 %  BP: (102-120)/(64-77) 112/74    Physical Exam:   General: alert and oriented, NAD   SVE: closed/thick/high    FHT: 130bpm, mod BTBV, +accels, no decels  [place]: q8-10 min    A/P: 36yo  at 35w4d admitted for blood sugar patterning secondary to type 1 DM, hx c/s x 2  - ctx noted on monitor, not painful per patient  - cervix closed on exam  - counseled patient to notify nurse of any contractions that become painful or more regular  - continue current plan of care.    Michell Loja M.D.  PGY-4 OB/GYN            "

## 2018-03-12 ENCOUNTER — TELEPHONE (OUTPATIENT)
Dept: OBSTETRICS AND GYNECOLOGY | Facility: CLINIC | Age: 38
End: 2018-03-12

## 2018-03-12 VITALS
TEMPERATURE: 97 F | OXYGEN SATURATION: 98 % | SYSTOLIC BLOOD PRESSURE: 119 MMHG | RESPIRATION RATE: 18 BRPM | DIASTOLIC BLOOD PRESSURE: 79 MMHG | HEART RATE: 90 BPM | BODY MASS INDEX: 35.81 KG/M2 | WEIGHT: 214.94 LBS | HEIGHT: 65 IN

## 2018-03-12 DIAGNOSIS — Z36.9 ENCOUNTER FOR FETAL ULTRASOUND: Primary | ICD-10-CM

## 2018-03-12 LAB
POCT GLUCOSE: 129 MG/DL (ref 70–110)
POCT GLUCOSE: 145 MG/DL (ref 70–110)

## 2018-03-12 PROCEDURE — 25000003 PHARM REV CODE 250: Performed by: STUDENT IN AN ORGANIZED HEALTH CARE EDUCATION/TRAINING PROGRAM

## 2018-03-12 PROCEDURE — 99231 SBSQ HOSP IP/OBS SF/LOW 25: CPT | Mod: ,,, | Performed by: PEDIATRICS

## 2018-03-12 PROCEDURE — 63600175 PHARM REV CODE 636 W HCPCS: Performed by: STUDENT IN AN ORGANIZED HEALTH CARE EDUCATION/TRAINING PROGRAM

## 2018-03-12 RX ORDER — INSULIN ASPART 100 [IU]/ML
INJECTION, SOLUTION INTRAVENOUS; SUBCUTANEOUS
Qty: 19.8 ML | Refills: 3 | Status: ON HOLD | OUTPATIENT
Start: 2018-03-12 | End: 2018-03-23

## 2018-03-12 RX ADMIN — PRENATAL VIT W/ FE FUMARATE-FA TAB 27-0.8 MG 1 TABLET: 27-0.8 TAB at 08:03

## 2018-03-12 RX ADMIN — INSULIN ASPART 28 UNITS: 100 INJECTION, SOLUTION INTRAVENOUS; SUBCUTANEOUS at 09:03

## 2018-03-12 RX ADMIN — ASPIRIN 81 MG CHEWABLE TABLET 81 MG: 81 TABLET CHEWABLE at 08:03

## 2018-03-12 RX ADMIN — INSULIN DETEMIR 22 UNITS: 100 INJECTION, SOLUTION SUBCUTANEOUS at 09:03

## 2018-03-12 NOTE — ASSESSMENT & PLAN NOTE
- currently on levemir ; novalog  with meals   - started with home regimen, trending sugars and adjusting as indicated   - prior to admission, blood sugars were 130s to 290s; A1c 3/5 6.6  - accuchecks fasting and 2h post-prandial  - carb counting breakfast/71g lunch/67 g dinner/ - turkey sandwich snack at bedtime, possibly ate out of vending machine at midnight  - diabetic diet ordered   - CBC and CMP on admission were within acceptable ranges  - Fasting BG this   - insulin aspart adjusted for today

## 2018-03-12 NOTE — PLAN OF CARE
Problem: Patient Care Overview  Goal: Plan of Care Review  Outcome: Ongoing (interventions implemented as appropriate)  Patient doing well overnight, BG controlled with insulin. Patients VSS and  reports positive fetal movement, denies any LOF or VB and no contractions during this shift. All questions and concerns have been answered and patient to encouraged to call RN with any further questions.  remained at bedside over night. Bed in lowest position, call bell within reach. Will continue to monitor

## 2018-03-12 NOTE — DISCHARGE SUMMARY
[facility]  Obstetrics  Discharge Summary      Patient Name: Jimena Hazel  MRN: [id number]  Admission Date: 3/5/2018  Hospital Length of Stay: 6 days  Discharge Date and Time:  2018 11:45 AM  Attending Physician: Blair Henry MD   Discharging Provider: Tiffany Rubio MD  Primary Care Provider: Levi Doyle MD    HPI: Jimena Hazel is a 37 y.o. [id number] at 34w5d presents from [facility] clinic  to elevated blood sugars in her blood sugar log. Patient is a type 1 Dm, diagnosed 4 years ago, who is insulin dependent. Her regimen includes taking levemir 20 in the morning and 46 at night. She also takes novalog 18/20/20 with meals. Takes her blood sugars 4x a day. Denies polyuria, polydipsia. Denies feeling weak or fatigued. Denies any nausea or vomiting with meals.   This IUP is complicated by type 1 Dm, h/o of 2 prior LTCS, and unwanted fertility . Patient is scheduled to have a repeat LTCS and BTL between 37-38 weeks.   Patient denies contractions, denies vaginal bleeding, denies LOF.   Fetal Movement: normal.       * No surgery found *     Hospital Course:   2018 Patient admitted to antepartum  to blood sugar patterning.   2018 Patient feeling well. Fasting  this AM after 46 levemir last night.  2018 BG fairly high yesterday. Insulin adjusted for today. Fasting BG 85 this AM. Patient has no complaints.  2018 Improved glucose control yesterday. Patient became symptomatic last night with a BG of 70. Received a glucose tab and improved. This AM, fasting 88. Minor adjustments today, but improvement encouraging for likely discharge to home today with new insulin regimen. Medically stable with plan for RTC in ~1 week for review of BG journal and RTC for prenatal care as scheduled.   2018 Overnight, patient felt like BG might be low around 0030. BG was 54. Had juice and recovered well. No other issues overnight. This AM no complaints. Desires discharge to  home today if BG control is adequate.  03/10/2018 Fasting BG was 58 this AM. Adjusted regiment. Will continue to monitor.  03/12/2018 Fasting  this AM. Glucose control improved yesterday. No issues this AM. Anticipate discharge to home later today.     Consults         Status Ordering Provider     Inpatient consult to Diabetes educator  Once     Provider:  (Not yet assigned)    Acknowledged SOCORRO LAZO          Final Active Diagnoses:    Diagnosis Date Noted POA    PRINCIPAL PROBLEM:  Diabetes type 1, uncontrolled [E10.65] 01/15/2016 Yes    34 weeks gestation of pregnancy [Z3A.34] 03/05/2018 Not Applicable    Diabetes in pregnancy [O24.919] 03/05/2018 Yes    Type 1 diabetes mellitus with hyperglycemia [E10.65] 01/11/2016 Yes    Long-term current use of insulin for diabetes mellitus [Z79.4, E11.9] 01/11/2016 Not Applicable      Problems Resolved During this Admission:    Diagnosis Date Noted Date Resolved POA        Labs: CMP No results for input(s): NA, K, CL, CO2, GLU, BUN, CREATININE, CALCIUM, PROT, ALBUMIN, BILITOT, ALKPHOS, AST, ALT, ANIONGAP, ESTGFRAFRICA, EGFRNONAA in the last 48 hours. and CBC No results for input(s): WBC, HGB, HCT, PLT in the last 48 hours.      Immunizations     None          This patient has no babies on file.  Pending Diagnostic Studies:     None          Discharged Condition: good    Disposition: Home or Self Care    Follow Up:  Follow-up Information     Baylee Roberto MD On 3/15/2018.    Specialty:  Maternal and Fetal Medicine  Why:  2:40 PM BPP and blood glucose check   Contact information:  [address]  [facility] [address]  [phone]             Nidhi Miller MD.    Specialties:  Obstetrics, Obstetrics and Gynecology  Why:  prenatal appointment as scheduled  Contact information:  [address] [address] [phone]                 Patient Instructions:     Diet diabetic     Activity as tolerated     Notify your health care  provider if you experience any of the following:  increased confusion or weakness     Notify your health care provider if you experience any of the following:  persistent dizziness, light-headedness, or visual disturbances     Notify your health care provider if you experience any of the following:  worsening rash     Notify your health care provider if you experience any of the following:  difficulty breathing or increased cough     Notify your health care provider if you experience any of the following:  redness, tenderness, or signs of infection (pain, swelling, redness, odor or green/yellow discharge around incision site)     Notify your health care provider if you experience any of the following:  severe uncontrolled pain     Notify your health care provider if you experience any of the following:  persistent nausea and vomiting or diarrhea     Notify your health care provider if you experience any of the following:  temperature >100.4     Notify your health care provider if you experience any of the following:  severe persistent headache     Prenatal Testing   Standing Status: Standing Number of Occurrences: 99 Standing Exp. Date: 03/12/19   Scheduling Instructions: Beginning on 3/19   Order Specific Question Answer Comments   Procedures to be performed: Non Stress Test (NST)    Procedures to be performed: Biophysical Profile (BPP)    Procedures to be performed: Amniotic Fluid Index (SUMA)        Medications:  Current Discharge Medication List      CONTINUE these medications which have CHANGED    Details   insulin aspart U-100 (NOVOLOG) 100 unit/mL InPn pen Take 28 U with Breakfast; Take 28 U with Lunch, Take 16U with Dinner every day  Qty: 19.8 mL, Refills: 3      insulin detemir U-100 (LEVEMIR FLEXTOUCH) 100 unit/mL (3 mL) SubQ InPn pen Take 22 U each morning at breakfast. Take 48 U at bedtime with a small snack.  Qty: 1 Box, Refills: 3         CONTINUE these medications which have NOT CHANGED    Details  "  acetaminophen (TYLENOL) 325 MG tablet Take 500 mg by mouth every 6 (six) hours as needed for Pain.      aspirin 81 MG Chew Take 1 tablet (81 mg total) by mouth once daily.  Refills: 0      blood sugar diagnostic Strp Check glucose 8x/day  Qty: 300 strip, Refills: 11    Associated Diagnoses: Uncontrolled type 1 diabetes mellitus with hyperglycemia      glucagon (human recombinant) inj 1mg/mL kit Follow package directions for low blood sugar.  Qty: 1 kit, Refills: 1    Associated Diagnoses: Uncontrolled type 1 diabetes mellitus without complication      pen needle, diabetic (BD INSULIN PEN NEEDLE UF MINI) 31 gauge x 3/16" Ndle To use 5-6x/day with insulin injections.  Qty: 200 each, Refills: 3    Associated Diagnoses: Uncontrolled type 1 diabetes mellitus with hyperglycemia           Prenatal testing is ordered for 2x/week:  Mon BPP, Thurs NST/SUMA  Next appt: 3/15/18 in [facility] for NST/SUMA and BG check. Bring glucose journal.    Tiffany Rubio MD  Obstetrics  [facility]  "

## 2018-03-12 NOTE — PLAN OF CARE
Problem: Patient Care Overview  Goal: Individualization & Mutuality  Outcome: Outcome(s) achieved Date Met: 03/12/18  Pt. Being discharged today, all discharge instructions reviewed and medications reviewed. Pt. Verbalizes understanding. Will follow up in clinic on Thursday with MARCELLE.

## 2018-03-12 NOTE — SUBJECTIVE & OBJECTIVE
Obstetric HPI:  Fasting  this AM. Glucose control improved yesterday. No issues this AM. Anticipate discharge to home later today.    Patient reports None contractions, active fetal movement, absent vaginal bleeding , absent loss of fluid      Objective:     Vital Signs (Most Recent):  Temp: 97.2 °F (36.2 °C) (03/12/18 0410)  Pulse: 81 (03/12/18 0410)  Resp: 16 (03/12/18 0410)  BP: (!) 106/56 (03/12/18 0410)  SpO2: 96 % (03/12/18 0410) Vital Signs (24h Range):  Temp:  [96.8 °F (36 °C)-97.6 °F (36.4 °C)] 97.2 °F (36.2 °C)  Pulse:  [81-94] 81  Resp:  [16-18] 16  SpO2:  [94 %-100 %] 96 %  BP: (101-120)/(56-76) 106/56     Weight: 97.5 kg (214 lb 15.2 oz)  Body mass index is 35.77 kg/m².    FHT: Cat 1 (reassuring) 130 bpm, + accels, - decels, moderate BTBV  TOCO: No contractions, minimal irritaiblity    Intake/Output Summary (Last 24 hours) at 03/12/18 0702  Last data filed at 03/12/18 0649   Gross per 24 hour   Intake              820 ml   Output              510 ml   Net              310 ml     Significant Labs:  Recent Lab Results       03/12/18  0558 03/11/18  2130 03/11/18  2027 03/11/18  1911 03/11/18  1557      POCT Glucose 129(H) 114(H) 96 54(L) 74                 03/11/18  1159      POCT Glucose 145(H)         Physical Exam:   Constitutional: She is oriented to person, place, and time. She appears well-developed and well-nourished. No distress.    HENT:   Head: Normocephalic and atraumatic.    Eyes: Conjunctivae and EOM are normal. Pupils are equal, round, and reactive to light.    Neck: Normal range of motion. Neck supple.    Cardiovascular: Normal rate and regular rhythm.     Pulmonary/Chest: Effort normal and breath sounds normal.        Abdominal: Soft. She exhibits no distension. There is no tenderness. There is no guarding.   Gravid, non-tender     Genitourinary:   Genitourinary Comments: deferred           Musculoskeletal: Normal range of motion and moves all extremeties. She exhibits no edema or  tenderness.       Neurological: She is alert and oriented to person, place, and time.    Skin: Skin is warm and dry.    Psychiatric: She has a normal mood and affect. Her behavior is normal. Judgment and thought content normal.

## 2018-03-13 ENCOUNTER — TELEPHONE (OUTPATIENT)
Dept: OBSTETRICS AND GYNECOLOGY | Facility: CLINIC | Age: 38
End: 2018-03-13

## 2018-03-15 ENCOUNTER — OFFICE VISIT (OUTPATIENT)
Dept: MATERNAL FETAL MEDICINE | Facility: CLINIC | Age: 38
End: 2018-03-15
Payer: COMMERCIAL

## 2018-03-15 ENCOUNTER — TELEPHONE (OUTPATIENT)
Dept: OBSTETRICS AND GYNECOLOGY | Facility: CLINIC | Age: 38
End: 2018-03-15

## 2018-03-15 ENCOUNTER — LAB VISIT (OUTPATIENT)
Dept: LAB | Facility: OTHER | Age: 38
End: 2018-03-15
Attending: OBSTETRICS & GYNECOLOGY
Payer: COMMERCIAL

## 2018-03-15 ENCOUNTER — ROUTINE PRENATAL (OUTPATIENT)
Dept: OBSTETRICS AND GYNECOLOGY | Facility: CLINIC | Age: 38
End: 2018-03-15
Payer: COMMERCIAL

## 2018-03-15 ENCOUNTER — PATIENT MESSAGE (OUTPATIENT)
Dept: MATERNAL FETAL MEDICINE | Facility: CLINIC | Age: 38
End: 2018-03-15

## 2018-03-15 VITALS
DIASTOLIC BLOOD PRESSURE: 70 MMHG | BODY MASS INDEX: 36.72 KG/M2 | SYSTOLIC BLOOD PRESSURE: 112 MMHG | WEIGHT: 220.69 LBS

## 2018-03-15 VITALS
DIASTOLIC BLOOD PRESSURE: 80 MMHG | BODY MASS INDEX: 36.87 KG/M2 | SYSTOLIC BLOOD PRESSURE: 120 MMHG | WEIGHT: 221.56 LBS

## 2018-03-15 DIAGNOSIS — O24.414 INSULIN CONTROLLED GESTATIONAL DIABETES MELLITUS (GDM) IN THIRD TRIMESTER: Primary | ICD-10-CM

## 2018-03-15 DIAGNOSIS — Z36.9 ENCOUNTER FOR FETAL ULTRASOUND: ICD-10-CM

## 2018-03-15 DIAGNOSIS — Z3A.36 36 WEEKS GESTATION OF PREGNANCY: ICD-10-CM

## 2018-03-15 DIAGNOSIS — Z3A.36 36 WEEKS GESTATION OF PREGNANCY: Primary | ICD-10-CM

## 2018-03-15 DIAGNOSIS — O34.219 HISTORY OF CESAREAN SECTION COMPLICATING PREGNANCY: ICD-10-CM

## 2018-03-15 DIAGNOSIS — E10.9 DIABETES MELLITUS, LABILE: ICD-10-CM

## 2018-03-15 PROCEDURE — 87081 CULTURE SCREEN ONLY: CPT

## 2018-03-15 PROCEDURE — 86703 HIV-1/HIV-2 1 RESULT ANTBDY: CPT

## 2018-03-15 PROCEDURE — 76816 OB US FOLLOW-UP PER FETUS: CPT | Mod: S$GLB,,, | Performed by: OBSTETRICS & GYNECOLOGY

## 2018-03-15 PROCEDURE — 76819 FETAL BIOPHYS PROFIL W/O NST: CPT | Mod: S$GLB,,, | Performed by: OBSTETRICS & GYNECOLOGY

## 2018-03-15 PROCEDURE — 99999 PR PBB SHADOW E&M-EST. PATIENT-LVL III: CPT | Mod: PBBFAC,,, | Performed by: OBSTETRICS & GYNECOLOGY

## 2018-03-15 PROCEDURE — 87147 CULTURE TYPE IMMUNOLOGIC: CPT

## 2018-03-15 PROCEDURE — 86592 SYPHILIS TEST NON-TREP QUAL: CPT

## 2018-03-15 PROCEDURE — 99213 OFFICE O/P EST LOW 20 MIN: CPT | Mod: 25,S$GLB,, | Performed by: OBSTETRICS & GYNECOLOGY

## 2018-03-15 PROCEDURE — 36415 COLL VENOUS BLD VENIPUNCTURE: CPT

## 2018-03-15 PROCEDURE — 87184 SC STD DISK METHOD PER PLATE: CPT

## 2018-03-15 PROCEDURE — 0502F SUBSEQUENT PRENATAL CARE: CPT | Mod: S$GLB,,, | Performed by: OBSTETRICS & GYNECOLOGY

## 2018-03-15 NOTE — LETTER
March 16, 2018      Nidhi Miller MD  [address]  [address]  [place] [facility] [address]           [facility]  [address]  [facility] [address]  Phone: [phone]          Patient: Jimena Hazel   MR Number: [id number]   YOB: 1980   Date of Visit: 3/15/2018       Dear Dr. Nidhi Miller:    Thank you for referring Jimnea Hazel to me for evaluation. Attached you will find relevant portions of my assessment and plan of care.    If you have questions, please do not hesitate to call me. I look forward to following Jimena Hazel along with you.    Sincerely,    Blair Henry MD    Enclosure  CC:  No Recipients    If you would like to receive this communication electronically, please contact [email or web address] or [phone] to request more information on [employer] access.    For providers and/or their staff who would like to refer a patient to [facility], please contact us through our one-stop-shop provider referral line, [facility] [place], at [phone].    If you feel you have received this communication in error or would no longer like to receive these types of communications, please e-mail [email or web address]

## 2018-03-15 NOTE — NURSING
Patient to [facility] for follow up blood sugar log review and BPP.    Patient's current medication management includes:    Levemir:  - 22 units in AM  - 48 units in PM    Novolog:  - 28 units with breakfast  - 28 units with lunch  - 16 units with dinner    Blood sugar log reviewed per Dr. Henry.

## 2018-03-15 NOTE — TELEPHONE ENCOUNTER
----- Message from Roshan Manzo sent at 3/15/2018  4:04 PM CDT -----  Ob pt calling for the time of her c/s [phone]

## 2018-03-15 NOTE — TELEPHONE ENCOUNTER
Informed patient that we did get her  scheduled on 3/21/18 at noon she is to arrive 2 hours early and nothing to eat or drink 8 hours prior to . The patient voiced understanding and had no question for me.

## 2018-03-15 NOTE — PROGRESS NOTES
HERE for routine OB visit at 36 1/7 wks, with LABILE DM-  She was admitted for one week to control BS and they are still fastings up to 120 and 2 hour PP about 120- per patient - NO LOG.  She may go on the pump after delivery.   Denies vaginal bleeding, cramping/ ctx, or LOF.  + FM.  [facility] BID.   MFM appt today.  HIV RPR and GBBS done. Will plan CS at 37 wks- MARCH 21.

## 2018-03-15 NOTE — TELEPHONE ENCOUNTER
Called to inform pt that her  has been scheduled for 3/21/2018 at noon. Pt is to arrive 2 hours before scheduled delivery and to have no food or water for 8 hours before delivery. Pt can take sips of water if her mouth feels uncomfortably dry.

## 2018-03-16 LAB
HIV 1+2 AB+HIV1 P24 AG SERPL QL IA: NEGATIVE
RPR SER QL: NORMAL

## 2018-03-16 NOTE — PROGRESS NOTES
Obstetrical ultrasound completed today.  See report in imaging section of [facility].

## 2018-03-19 ENCOUNTER — HOSPITAL ENCOUNTER (OUTPATIENT)
Dept: PERINATAL CARE | Facility: OTHER | Age: 38
Discharge: HOME | End: 2018-03-19
Attending: OBSTETRICS & GYNECOLOGY
Payer: COMMERCIAL

## 2018-03-19 DIAGNOSIS — O24.414 INSULIN CONTROLLED GESTATIONAL DIABETES MELLITUS (GDM) IN THIRD TRIMESTER: ICD-10-CM

## 2018-03-19 LAB — BACTERIA SPEC AEROBE CULT: NORMAL

## 2018-03-19 PROCEDURE — 59025 FETAL NON-STRESS TEST: CPT | Mod: 26,,, | Performed by: PEDIATRICS

## 2018-03-19 PROCEDURE — 59025 FETAL NON-STRESS TEST: CPT

## 2018-03-19 NOTE — PROGRESS NOTES
Indication  ========    IDDM.    History  ======    Risk Factors  History risk factors: Diabetes mellitus  Details: Type 1    Maternal Assessment  =================    BP syst 134 mmHg  BP diast 91 mmHg  Other: 125/85    Pregnancy  =========    Alston pregnancy. Number of fetuses: 1.    Dating  ======    Cycle: regular cycle  Assigned: Dating performed on 08/31/2017, based on ultrasound (CRL)  Assigned GA 36 w + 5 d  Assigned ZEKE: 4/11/2018    Non Stress Test  =============    NST interpretation: reactive. Test duration 36 min. Baseline  bpm. Baseline variability: moderate. Accelerations: present.  Decelerations: present, variable. Uterine activity: present. Acoustic stimulation: no  reports + fetal movement, denies ctx, vag bleeding or loss of fluid. reviewed [facility]    Impression  =========    NST R.    Recommendation  ==============    Continue fetal surveillance as previously outlined.

## 2018-03-21 ENCOUNTER — HOSPITAL ENCOUNTER (INPATIENT)
Facility: OTHER | Age: 38
LOS: 3 days | Discharge: HOME | End: 2018-03-24
Attending: OBSTETRICS & GYNECOLOGY | Admitting: OBSTETRICS & GYNECOLOGY
Payer: COMMERCIAL

## 2018-03-21 ENCOUNTER — ANESTHESIA (OUTPATIENT)
Dept: OBSTETRICS AND GYNECOLOGY | Facility: OTHER | Age: 38
End: 2018-03-21
Payer: COMMERCIAL

## 2018-03-21 ENCOUNTER — ANESTHESIA EVENT (OUTPATIENT)
Dept: OBSTETRICS AND GYNECOLOGY | Facility: OTHER | Age: 38
End: 2018-03-21
Payer: COMMERCIAL

## 2018-03-21 DIAGNOSIS — Z98.51 S/P TUBAL LIGATION: ICD-10-CM

## 2018-03-21 DIAGNOSIS — O34.219 HISTORY OF CESAREAN SECTION COMPLICATING PREGNANCY: ICD-10-CM

## 2018-03-21 DIAGNOSIS — E10.65 TYPE 1 DIABETES MELLITUS WITH HYPERGLYCEMIA: Primary | ICD-10-CM

## 2018-03-21 DIAGNOSIS — Z98.891 STATUS POST REPEAT LOW TRANSVERSE CESAREAN SECTION: ICD-10-CM

## 2018-03-21 DIAGNOSIS — O24.012 TYPE 1 DIABETES MELLITUS COMPLICATING PREGNANCY IN SECOND TRIMESTER, ANTEPARTUM: ICD-10-CM

## 2018-03-21 PROBLEM — Z30.09 UNWANTED FERTILITY: Status: ACTIVE | Noted: 2018-03-21

## 2018-03-21 PROBLEM — Z30.09 UNWANTED FERTILITY: Status: RESOLVED | Noted: 2018-03-21 | Resolved: 2018-03-21

## 2018-03-21 LAB
ABO + RH BLD: NORMAL
BASOPHILS # BLD AUTO: 0 K/UL
BASOPHILS NFR BLD: 0 %
BLD GP AB SCN CELLS X3 SERPL QL: NORMAL
DIFFERENTIAL METHOD: ABNORMAL
EOSINOPHIL # BLD AUTO: 0 K/UL
EOSINOPHIL NFR BLD: 0.2 %
ERYTHROCYTE [DISTWIDTH] IN BLOOD BY AUTOMATED COUNT: 13.4 %
GLUCOSE SERPL-MCNC: 187 MG/DL (ref 70–110)
HCT VFR BLD AUTO: 33.6 %
HGB BLD-MCNC: 11.2 G/DL
LYMPHOCYTES # BLD AUTO: 1.5 K/UL
LYMPHOCYTES NFR BLD: 23.8 %
MCH RBC QN AUTO: 30.4 PG
MCHC RBC AUTO-ENTMCNC: 33.3 G/DL
MCV RBC AUTO: 91 FL
MONOCYTES # BLD AUTO: 0.2 K/UL
MONOCYTES NFR BLD: 2.6 %
NEUTROPHILS # BLD AUTO: 4.7 K/UL
NEUTROPHILS NFR BLD: 73.2 %
PLATELET # BLD AUTO: 160 K/UL
PMV BLD AUTO: 9.9 FL
POCT GLUCOSE: 159 MG/DL (ref 70–110)
POCT GLUCOSE: 50 MG/DL (ref 70–110)
POCT GLUCOSE: 85 MG/DL (ref 70–110)
POCT GLUCOSE: 87 MG/DL (ref 70–110)
RBC # BLD AUTO: 3.68 M/UL
WBC # BLD AUTO: 6.47 K/UL

## 2018-03-21 PROCEDURE — 25000003 PHARM REV CODE 250: Performed by: STUDENT IN AN ORGANIZED HEALTH CARE EDUCATION/TRAINING PROGRAM

## 2018-03-21 PROCEDURE — 0UB70ZZ EXCISION OF BILATERAL FALLOPIAN TUBES, OPEN APPROACH: ICD-10-PCS | Performed by: OBSTETRICS & GYNECOLOGY

## 2018-03-21 PROCEDURE — 11000001 HC ACUTE MED/SURG PRIVATE ROOM

## 2018-03-21 PROCEDURE — 37000009 HC ANESTHESIA EA ADD 15 MINS: Performed by: OBSTETRICS & GYNECOLOGY

## 2018-03-21 PROCEDURE — 63600175 PHARM REV CODE 636 W HCPCS: Performed by: STUDENT IN AN ORGANIZED HEALTH CARE EDUCATION/TRAINING PROGRAM

## 2018-03-21 PROCEDURE — S0028 INJECTION, FAMOTIDINE, 20 MG: HCPCS | Performed by: STUDENT IN AN ORGANIZED HEALTH CARE EDUCATION/TRAINING PROGRAM

## 2018-03-21 PROCEDURE — 51702 INSERT TEMP BLADDER CATH: CPT

## 2018-03-21 PROCEDURE — 59510 CESAREAN DELIVERY: CPT | Mod: ,,, | Performed by: OBSTETRICS & GYNECOLOGY

## 2018-03-21 PROCEDURE — 37000008 HC ANESTHESIA 1ST 15 MINUTES: Performed by: OBSTETRICS & GYNECOLOGY

## 2018-03-21 PROCEDURE — 59510 CESAREAN DELIVERY: CPT | Mod: ,,, | Performed by: ANESTHESIOLOGY

## 2018-03-21 PROCEDURE — 36000680 HC C/S TUBAL LIGATION LEVEL I

## 2018-03-21 PROCEDURE — 85025 COMPLETE CBC W/AUTO DIFF WBC: CPT

## 2018-03-21 PROCEDURE — 27200688 HC TRAY, SPINAL-HYPER/ ISOBARIC: Performed by: STUDENT IN AN ORGANIZED HEALTH CARE EDUCATION/TRAINING PROGRAM

## 2018-03-21 PROCEDURE — 58611 LIGATE OVIDUCT(S) ADD-ON: CPT | Mod: ,,, | Performed by: OBSTETRICS & GYNECOLOGY

## 2018-03-21 PROCEDURE — 86850 RBC ANTIBODY SCREEN: CPT

## 2018-03-21 RX ORDER — METOCLOPRAMIDE HYDROCHLORIDE 5 MG/ML
5 INJECTION INTRAMUSCULAR; INTRAVENOUS EVERY 6 HOURS PRN
Status: DISCONTINUED | OUTPATIENT
Start: 2018-03-21 | End: 2018-03-24 | Stop reason: HOSPADM

## 2018-03-21 RX ORDER — ACETAMINOPHEN 10 MG/ML
INJECTION, SOLUTION INTRAVENOUS
Status: DISCONTINUED | OUTPATIENT
Start: 2018-03-21 | End: 2018-03-21

## 2018-03-21 RX ORDER — ONDANSETRON 2 MG/ML
4 INJECTION INTRAMUSCULAR; INTRAVENOUS EVERY 6 HOURS PRN
Status: DISCONTINUED | OUTPATIENT
Start: 2018-03-21 | End: 2018-03-24 | Stop reason: HOSPADM

## 2018-03-21 RX ORDER — INSULIN ASPART 100 [IU]/ML
14 INJECTION, SOLUTION INTRAVENOUS; SUBCUTANEOUS
Status: DISCONTINUED | OUTPATIENT
Start: 2018-03-22 | End: 2018-03-24 | Stop reason: HOSPADM

## 2018-03-21 RX ORDER — GLUCAGON 1 MG
1 KIT INJECTION
Status: DISCONTINUED | OUTPATIENT
Start: 2018-03-21 | End: 2018-03-21

## 2018-03-21 RX ORDER — ONDANSETRON 8 MG/1
8 TABLET, ORALLY DISINTEGRATING ORAL EVERY 8 HOURS PRN
Status: DISCONTINUED | OUTPATIENT
Start: 2018-03-21 | End: 2018-03-24 | Stop reason: HOSPADM

## 2018-03-21 RX ORDER — ACETAMINOPHEN 325 MG/1
650 TABLET ORAL EVERY 6 HOURS
Status: DISPENSED | OUTPATIENT
Start: 2018-03-21 | End: 2018-03-22

## 2018-03-21 RX ORDER — INSULIN ASPART 100 [IU]/ML
16 INJECTION, SOLUTION INTRAVENOUS; SUBCUTANEOUS
Status: DISCONTINUED | OUTPATIENT
Start: 2018-03-21 | End: 2018-03-24 | Stop reason: HOSPADM

## 2018-03-21 RX ORDER — SODIUM CHLORIDE, SODIUM LACTATE, POTASSIUM CHLORIDE, CALCIUM CHLORIDE 600; 310; 30; 20 MG/100ML; MG/100ML; MG/100ML; MG/100ML
INJECTION, SOLUTION INTRAVENOUS CONTINUOUS PRN
Status: DISCONTINUED | OUTPATIENT
Start: 2018-03-21 | End: 2018-03-21

## 2018-03-21 RX ORDER — DOCUSATE SODIUM 100 MG/1
200 CAPSULE, LIQUID FILLED ORAL 2 TIMES DAILY PRN
Status: DISCONTINUED | OUTPATIENT
Start: 2018-03-21 | End: 2018-03-24 | Stop reason: HOSPADM

## 2018-03-21 RX ORDER — OXYTOCIN/RINGER'S LACTATE 20/1000 ML
41.65 PLASTIC BAG, INJECTION (ML) INTRAVENOUS CONTINUOUS
Status: ACTIVE | OUTPATIENT
Start: 2018-03-21 | End: 2018-03-21

## 2018-03-21 RX ORDER — MUPIROCIN 20 MG/G
1 OINTMENT TOPICAL 2 TIMES DAILY PRN
Status: DISCONTINUED | OUTPATIENT
Start: 2018-03-21 | End: 2018-03-24 | Stop reason: HOSPADM

## 2018-03-21 RX ORDER — OXYTOCIN 10 [USP'U]/ML
INJECTION, SOLUTION INTRAMUSCULAR; INTRAVENOUS
Status: DISCONTINUED | OUTPATIENT
Start: 2018-03-21 | End: 2018-03-21

## 2018-03-21 RX ORDER — CEFAZOLIN SODIUM 1 G/3ML
2 INJECTION, POWDER, FOR SOLUTION INTRAMUSCULAR; INTRAVENOUS
Status: DISCONTINUED | OUTPATIENT
Start: 2018-03-21 | End: 2018-03-21

## 2018-03-21 RX ORDER — MUPIROCIN 20 MG/G
OINTMENT TOPICAL
Status: DISCONTINUED | OUTPATIENT
Start: 2018-03-21 | End: 2018-03-21

## 2018-03-21 RX ORDER — INSULIN ASPART 100 [IU]/ML
1-10 INJECTION, SOLUTION INTRAVENOUS; SUBCUTANEOUS EVERY 6 HOURS PRN
Status: DISCONTINUED | OUTPATIENT
Start: 2018-03-21 | End: 2018-03-21

## 2018-03-21 RX ORDER — SIMETHICONE 80 MG
1 TABLET,CHEWABLE ORAL EVERY 6 HOURS PRN
Status: DISCONTINUED | OUTPATIENT
Start: 2018-03-21 | End: 2018-03-24 | Stop reason: HOSPADM

## 2018-03-21 RX ORDER — OXYCODONE HYDROCHLORIDE 5 MG/1
5 TABLET ORAL EVERY 4 HOURS PRN
Status: ACTIVE | OUTPATIENT
Start: 2018-03-21 | End: 2018-03-22

## 2018-03-21 RX ORDER — FENTANYL CITRATE 50 UG/ML
INJECTION, SOLUTION INTRAMUSCULAR; INTRAVENOUS
Status: DISCONTINUED | OUTPATIENT
Start: 2018-03-21 | End: 2018-03-21

## 2018-03-21 RX ORDER — METHYLERGONOVINE MALEATE 0.2 MG/ML
200 INJECTION INTRAVENOUS
Status: DISCONTINUED | OUTPATIENT
Start: 2018-03-21 | End: 2018-03-21

## 2018-03-21 RX ORDER — SODIUM CHLORIDE, SODIUM LACTATE, POTASSIUM CHLORIDE, CALCIUM CHLORIDE 600; 310; 30; 20 MG/100ML; MG/100ML; MG/100ML; MG/100ML
INJECTION, SOLUTION INTRAVENOUS CONTINUOUS
Status: DISCONTINUED | OUTPATIENT
Start: 2018-03-21 | End: 2018-03-21

## 2018-03-21 RX ORDER — CARBOPROST TROMETHAMINE 250 UG/ML
250 INJECTION, SOLUTION INTRAMUSCULAR
Status: DISCONTINUED | OUTPATIENT
Start: 2018-03-21 | End: 2018-03-21

## 2018-03-21 RX ORDER — NALBUPHINE HYDROCHLORIDE 10 MG/ML
2.5 INJECTION, SOLUTION INTRAMUSCULAR; INTRAVENOUS; SUBCUTANEOUS ONCE
Status: DISCONTINUED | OUTPATIENT
Start: 2018-03-21 | End: 2018-03-24 | Stop reason: HOSPADM

## 2018-03-21 RX ORDER — KETOROLAC TROMETHAMINE 30 MG/ML
30 INJECTION, SOLUTION INTRAMUSCULAR; INTRAVENOUS EVERY 6 HOURS
Status: COMPLETED | OUTPATIENT
Start: 2018-03-21 | End: 2018-03-21

## 2018-03-21 RX ORDER — MORPHINE SULFATE 0.5 MG/ML
INJECTION, SOLUTION EPIDURAL; INTRATHECAL; INTRAVENOUS
Status: DISCONTINUED | OUTPATIENT
Start: 2018-03-21 | End: 2018-03-21

## 2018-03-21 RX ORDER — MISOPROSTOL 200 UG/1
800 TABLET ORAL
Status: DISCONTINUED | OUTPATIENT
Start: 2018-03-21 | End: 2018-03-21

## 2018-03-21 RX ORDER — FAMOTIDINE 10 MG/ML
20 INJECTION INTRAVENOUS
Status: DISCONTINUED | OUTPATIENT
Start: 2018-03-21 | End: 2018-03-21

## 2018-03-21 RX ORDER — BUPIVACAINE HYDROCHLORIDE 7.5 MG/ML
INJECTION, SOLUTION INTRASPINAL
Status: DISCONTINUED | OUTPATIENT
Start: 2018-03-21 | End: 2018-03-21

## 2018-03-21 RX ORDER — HYDROCODONE BITARTRATE AND ACETAMINOPHEN 5; 325 MG/1; MG/1
1 TABLET ORAL EVERY 4 HOURS PRN
Status: DISCONTINUED | OUTPATIENT
Start: 2018-03-22 | End: 2018-03-22

## 2018-03-21 RX ORDER — SODIUM CITRATE AND CITRIC ACID MONOHYDRATE 334; 500 MG/5ML; MG/5ML
30 SOLUTION ORAL
Status: DISCONTINUED | OUTPATIENT
Start: 2018-03-21 | End: 2018-03-21

## 2018-03-21 RX ORDER — IBUPROFEN 600 MG/1
600 TABLET ORAL EVERY 6 HOURS
Status: DISCONTINUED | OUTPATIENT
Start: 2018-03-22 | End: 2018-03-22

## 2018-03-21 RX ORDER — HYDROCODONE BITARTRATE AND ACETAMINOPHEN 10; 325 MG/1; MG/1
1 TABLET ORAL EVERY 4 HOURS PRN
Status: DISCONTINUED | OUTPATIENT
Start: 2018-03-22 | End: 2018-03-22

## 2018-03-21 RX ORDER — OXYCODONE HYDROCHLORIDE 5 MG/1
10 TABLET ORAL EVERY 4 HOURS PRN
Status: ACTIVE | OUTPATIENT
Start: 2018-03-21 | End: 2018-03-22

## 2018-03-21 RX ORDER — DIPHENHYDRAMINE HCL 25 MG
25 CAPSULE ORAL EVERY 4 HOURS PRN
Status: DISCONTINUED | OUTPATIENT
Start: 2018-03-21 | End: 2018-03-24 | Stop reason: HOSPADM

## 2018-03-21 RX ADMIN — SODIUM CHLORIDE, SODIUM LACTATE, POTASSIUM CHLORIDE, AND CALCIUM CHLORIDE: 600; 310; 30; 20 INJECTION, SOLUTION INTRAVENOUS at 12:03

## 2018-03-21 RX ADMIN — KETOROLAC TROMETHAMINE 30 MG: 30 INJECTION, SOLUTION INTRAMUSCULAR; INTRAVENOUS at 05:03

## 2018-03-21 RX ADMIN — ACETAMINOPHEN 650 MG: 325 TABLET ORAL at 11:03

## 2018-03-21 RX ADMIN — Medication 41.65 MILLI-UNITS/MIN: at 01:03

## 2018-03-21 RX ADMIN — OXYTOCIN 2 UNITS: 10 INJECTION, SOLUTION INTRAMUSCULAR; INTRAVENOUS at 12:03

## 2018-03-21 RX ADMIN — Medication 0.15 MG: at 12:03

## 2018-03-21 RX ADMIN — DEXTROSE 2 G: 50 INJECTION, SOLUTION INTRAVENOUS at 12:03

## 2018-03-21 RX ADMIN — KETOROLAC TROMETHAMINE 30 MG: 30 INJECTION, SOLUTION INTRAMUSCULAR; INTRAVENOUS at 12:03

## 2018-03-21 RX ADMIN — FAMOTIDINE 20 MG: 10 INJECTION INTRAVENOUS at 12:03

## 2018-03-21 RX ADMIN — ACETAMINOPHEN 1000 MG: 10 INJECTION, SOLUTION INTRAVENOUS at 12:03

## 2018-03-21 RX ADMIN — INSULIN ASPART 16 UNITS: 100 INJECTION, SOLUTION INTRAVENOUS; SUBCUTANEOUS at 08:03

## 2018-03-21 RX ADMIN — KETOROLAC TROMETHAMINE 30 MG: 30 INJECTION, SOLUTION INTRAMUSCULAR; INTRAVENOUS at 11:03

## 2018-03-21 RX ADMIN — SODIUM CHLORIDE, POTASSIUM CHLORIDE, SODIUM LACTATE AND CALCIUM CHLORIDE 1000 ML: 600; 310; 30; 20 INJECTION, SOLUTION INTRAVENOUS at 11:03

## 2018-03-21 RX ADMIN — ACETAMINOPHEN 650 MG: 325 TABLET ORAL at 05:03

## 2018-03-21 RX ADMIN — SODIUM CITRATE AND CITRIC ACID MONOHYDRATE 30 ML: 500; 334 SOLUTION ORAL at 12:03

## 2018-03-21 RX ADMIN — INSULIN DETEMIR 24 UNITS: 100 INJECTION, SOLUTION SUBCUTANEOUS at 09:03

## 2018-03-21 RX ADMIN — INSULIN ASPART 2 UNITS: 100 INJECTION, SOLUTION INTRAVENOUS; SUBCUTANEOUS at 11:03

## 2018-03-21 RX ADMIN — BUPIVACAINE HYDROCHLORIDE IN DEXTROSE 1.6 ML: 7.5 INJECTION, SOLUTION SUBARACHNOID at 12:03

## 2018-03-21 RX ADMIN — FENTANYL CITRATE 10 MCG: 50 INJECTION, SOLUTION INTRAMUSCULAR; INTRAVENOUS at 12:03

## 2018-03-21 NOTE — PROGRESS NOTES
Dr. Leigh at bedside to assess drainage on pt's dressing.  No new orders, will continue to monitor.

## 2018-03-21 NOTE — ANESTHESIA PROCEDURE NOTES
Spinal    Diagnosis:  section  Patient location during procedure: OR  Start time: 3/21/2018 12:16 PM  Timeout: 3/21/2018 12:16 PM  End time: 3/21/2018 12:19 PM  Staffing  Anesthesiologist: TITI BABCOCK  Resident/CRNA: HARDY VILLALBA  Performed: resident/CRNA   Preanesthetic Checklist  Completed: patient identified, surgical consent, pre-op evaluation, timeout performed, IV checked, risks and benefits discussed and monitors and equipment checked  Spinal Block  Patient position: sitting  Prep: ChloraPrep  Patient monitoring: continuous pulse ox and heart rate  Approach: midline  Location: L4-5  Injection technique: single shot  CSF Fluid: clear free-flowing CSF  Needle  Needle type: Dalia   Needle gauge: 25 G  Needle length: 3.5 in  Needle localization: anatomical landmarks  Assessment  Sensory level: T5   Dermatomal levels determined by pinch or prick  Ease of block: easy  Patient's tolerance of the procedure: comfortable throughout block  Medications:  Bolus administered: 1.6 mL of 0.75 and with dextrose bupivacaine fentanyl and morphine  Additional Notes  10mcg fentanyl   140mcg duramorph

## 2018-03-21 NOTE — DISCHARGE INSTRUCTIONS
Breastfeeding Discharge Instructions       Feed the baby at the earliest sign of hunger or comfort  o Hands to mouth, sucking motions  o Rooting or searching for something to suck on  o Dont wait for crying - it is a sign of distress     The feedings may be 8-12 times per 24hrs and will not follow a schedule   Avoid pacifiers and bottles for the first 4 weeks   Alternate the breast you start the feeding with, or start with the breast that feels the fullest   Switch breasts when the baby takes himself off the breast or falls asleep   Keep offering breasts until the baby looks full, no longer gives hunger signs, and stays asleep when placed on his back in the crib   If the baby is sleepy and wont wake for a feeding, put the baby skin-to-skin dressed in a diaper against the mothers bare chest   Sleep near your baby   The baby should be positioned and latched on to the breast correctly  o Chest-to-chest, chin in the breast  o Babys lips are flipped outward  o Babys mouth is stretched open wide like a shout  o Babys sucking should feel like tugging to the mother  - The baby should be drinking at the breast:  o You should hear swallowing or gulping throughout the feeding  o You should see milk on the babys lips when he comes off the breast  o Your breasts should be softer when the baby is finished feeding  o The baby should look relaxed at the end of feedings  o After the 4th day and your milk is in:  o The babys poop should turn bright yellow and be loose, watery, and seedy  o The baby should have at least 3-4 poops the size of the palm of your hand per day  o The baby should have at least 5-6 wet diapers per day  o The urine should be light yellow in color  You should drink when you are thirsty and eat a healthy diet when you are    hungry.     Take naps to get the rest you need.   Take medications and/or drink alcohol only with permission of your obstetrician    or the babys pediatrician.  You can  also call the [facility]ant Risk Center,   ([phone]), Monday-Friday, 8am-5pm Central time, to get the most   up-to-date evidence-based information on the use of medications during   pregnancy and breastfeeding.      The baby should be examined by a pediatrician at 3-5 days of age.  Once your   milk comes in, the baby should be gaining at least ½ - 1oz each day and should be back to birthweight no later than 10-14 days of age.          Community Resources    [facility] Breastfeeding Warmline: [phone]   Local [facility] clinics: provide incentives and breastpumps to eligible mothers  [employer] (LLLI):  mother-to-mother support group website        www.ResolutionTube.OhmData  Local La Leche League mother-to-mother support groups:        www.JournallyMe        La Leche League [facility]   Dr. Jerson Combs website for latch videos and general information:        www.breastfeedinginc.ca  [facility] Risk [facility] is a call center that provides information about the safety of taking medications while breastfeeding.  Call [phone], M-F, 8am-5pm, CT.  International Lactation Consultant Association provides resources for assistance:        www.ilca.org  [facility] provides informationand resources for parents  and the community    http://Trinity Healthastfeeding.org     Julieta Bullard is a mom-to-mom support group:                             www.Attend.comjacksonMessagemind.com//breastfeedng-support/  [employer] for Healthy Babies:  6-533-356-BABY(9844)  [employer]: a breastfeeding support group for women of color, [phone]

## 2018-03-21 NOTE — SUBJECTIVE & OBJECTIVE
"  Obstetric History       T2      L2     SAB0   TAB0   Ectopic0   Multiple0   Live Births0       # Outcome Date GA Lbr Sunny/2nd Weight Sex Delivery Anes PTL Lv   3 Current            2 Term 11 40w0d  4.479 kg (9 lb 14 oz) M CS-Unspec EPI        Name: Agnieszka Rios Term 03 42w0d  3.515 kg (7 lb 12 oz) M CS-Unspec EPI N       Name: Will        Past Medical History:   Diagnosis Date    Diabetes mellitus     Diabetes mellitus type I     Irregular heartbeat      Past Surgical History:   Procedure Laterality Date     SECTION, LOW TRANSVERSE         PTA Medications   Medication Sig    acetaminophen (TYLENOL) 325 MG tablet Take 500 mg by mouth every 6 (six) hours as needed for Pain.    aspirin 81 MG Chew Take 1 tablet (81 mg total) by mouth once daily.    blood sugar diagnostic Strp Check glucose 8x/day    glucagon (human recombinant) inj 1mg/mL kit Follow package directions for low blood sugar.    insulin aspart U-100 (NOVOLOG) 100 unit/mL InPn pen Take 28 U with Breakfast; Take 28 U with Lunch, Take 16U with Dinner every day    insulin detemir U-100 (LEVEMIR FLEXTOUCH) 100 unit/mL (3 mL) SubQ InPn pen Take 22 U each morning at breakfast. Take 48 U at bedtime with a small snack.    pen needle, diabetic (BD INSULIN PEN NEEDLE UF MINI) 31 gauge x 3/16" Ndle To use 5-6x/day with insulin injections.       Review of patient's allergies indicates:  No Known Allergies     Family History     Problem Relation (Age of Onset)    Diabetes Mother, Father    Hypertension Mother    No Known Problems Sister, Brother        Social History Main Topics    Smoking status: Never Smoker    Smokeless tobacco: Never Used    Alcohol use No    Drug use: No    Sexual activity: Yes     Partners: Male     Birth control/ protection: None     Review of Systems   Constitutional: Negative for chills and fever.   Eyes: Negative for visual disturbance.   Respiratory: Negative for shortness of breath.  "   Cardiovascular: Negative for chest pain and palpitations.   Gastrointestinal: Negative for abdominal pain, nausea and vomiting.   Genitourinary: Negative for vaginal bleeding and vaginal discharge.   Musculoskeletal: Negative for back pain.   Neurological: Negative for seizures, syncope and headaches.   Hematological: Does not bruise/bleed easily.   Psychiatric/Behavioral: The patient is not nervous/anxious.       Objective:     Vital Signs (Most Recent):  Temp: 96.5 °F (35.8 °C) (03/21/18 1057) Vital Signs (24h Range):  Temp:  [96.5 °F (35.8 °C)] 96.5 °F (35.8 °C)     Weight: 95.3 kg (210 lb)  Body mass index is 34.95 kg/m².    FHT: 140 Cat 1 (reassuring)  TOCO: irregular, not feeling them    Physical Exam:   Constitutional: She is oriented to person, place, and time. She appears well-developed and well-nourished. No distress.       Cardiovascular: Normal rate and regular rhythm.     Pulmonary/Chest: Effort normal. No respiratory distress.        Abdominal: Soft. She exhibits no distension. There is no tenderness.   gravid             Musculoskeletal: Moves all extremeties. She exhibits no edema.       Neurological: She is alert and oriented to person, place, and time.    Skin: Skin is warm and dry.    Psychiatric: She has a normal mood and affect. Her behavior is normal.       Cervix: deferred  Presentation: Vertex     Significant Labs:  Lab Results   Component Value Date    GROUPTRH A POS 03/05/2018    HEPBSAG Negative 09/18/2017    STREPBCULT  03/15/2018     STREPTOCOCCUS AGALACTIAE (GROUP B)  Beta-hemolytic streptococci are routinely susceptible to   penicillins,cephalosporins and carbapenems.         I have personallly reviewed all pertinent lab results from the last 24 hours.     Rubella: Immune  RPR: NR   HIV: negative  HepB: negative

## 2018-03-21 NOTE — PLAN OF CARE
Problem: Patient Care Overview  Goal: Plan of Care Review  Outcome: Ongoing (interventions implemented as appropriate)  Assisted with breastfeed baby; developed the following breastfeeding plan of care with patient: she will breastfeed baby on cue until content at least 8 times in 24 hours observing for signs of milk transfer; she will wake baby prn;

## 2018-03-21 NOTE — ASSESSMENT & PLAN NOTE
- on basal-bolus insulin at home   Aspart 28/28/16u w/ meals   detemir 22 u in AM, 48 u in PM  - follows with endocrinologist, planning to transition to insulin pump after delivery  - admit accucheck [facility]  - [facility] for coverage preop

## 2018-03-21 NOTE — L&D DELIVERY NOTE
Section Operative Note  Procedure Date: 2018    Procedure: Low transverse  Section via Pfannenstiel skin incision    Indications: prior c/s x2, uncontrolled diabetes    Pre-operative Diagnosis:   1. IUP at 37 week 0 day pregnancy  2. DM type 1  3. Undesired fertility    Post-operative Diagnosis: same    Surgeon: Nidhi Miller MD     Assistants: Everton Leigh MD - Resident    Anesthesia: Spinal anesthesia    Findings:   1. Normal appearing uterus, fallopian tubes, and ovaries.  2. Normal placenta.    Estimated Blood Loss:  500 mL           Total IV Fluids: 1000 mL     UOP: 100 mL    Specimens:   1. Single viable male infant   2. Placenta which was discarded  3. Portions of bilateral fallopian tubes    PreOp CBC:   Lab Results   Component Value Date    WBC 6.47 2018    HGB 11.2 (L) 2018    HCT 33.6 (L) 2018    MCV 91 2018     2018                     Complications:  None; patient tolerated the procedure well.           Disposition: PACU - hemodynamically stable.           Condition: stable    Procedure Details   The patient was seen in the Holding Room. The risks, benefits, complications, treatment options, and expected outcomes were discussed with the patient.  The patient concurred with the proposed plan, giving informed consent.  The site of surgery properly noted/marked. The patient was taken to Operating Room, identified as Trenee Hazel and the procedure verified as  Delivery. A Time Out was held and the above information confirmed.    After induction of anesthesia, the patient was prepped and draped in the usual sterile manner while placed in a dorsal supine position with a left lateral tilt.  A garcia catheter was also placed per nursing. Preoperative antibiotics were administered and an allis test was performed yielding adequate anesthesia.  A Pfannenstiel incision was made and carried down through the subcutaneous tissue to the fascia.  Fascial incision was made and extended transversely. The fascia was grasped with Kocher clamps and  from the underlying rectus tissue superiorly and inferiorly. The peritoneum was identified, found to be free of adherent bowel and entered sharply. Peritoneal incision was extended longitudinally. The vesico-uterine peritoneum was identified and bladder blade was inserted. The vesico-uterine peritoneum was incised transversely and the bladder flap was bluntly freed from the lower uterine segment. The bladder blade was reinserted to keep the bladder out of the operative field. A low transverse uterine incision was made with knife and extended with finger fracture. The amniotic sac was ruptured with hemostats and the infant was noted to be in cephalic position. The fetal head was brought to the incision and elevated out of the pelvis. The patient delivered a single viable male infant. Infant weighed 3220 grams with Apgar scores of 8/9 at one and five minutes respectively. After the umbilical cord was clamped and cut, cord blood was obtained for evaluation. The placenta was removed intact and appeared normal, and was discarded. The uterus was exteriorized. The uterine outline, tubes and ovaries appeared normal. The uterine incision was closed with running locked sutures of #1 chromic. Hemostasis was observed. Figure of eight sutures were used for adequate hemostasis.    The patient's left Fallopian tube was then identified, grasped with a Yukon clamp, and followed out to the fimbria. A Yukon clamp was used to grasp the tube approximately 4 cm from the mid-isthmic portion. A 3 cm segment of tube was then ligated with two free ties of 0 chromic suture in a modified Frazer technique and excised with Metzenbaum scissors. Tubal ostia were identified, good hemostasis was noted and the tube was returned to the abdomen. The right Fallopian tube was identified and grasped in the mid-isthmic region, doubly ligated  and a 3 cm segment was excised in a similar fashion. Good hemostasis was noted and the tube was returned to the abdomen.     The uterus was returned to the abdominal cavity. Incision was reinspected and good hemostasis was noted. The abdominal cavity was irrigated to remove clots. The muscle was reapproximated with 2-0 chromic. The fascia was then reapproximated with running sutures of 1 PDS. The skin was reapproximated with 4-0 monocryl.    Instrument, sponge, and needle counts were correct prior the abdominal closure and at the conclusion of the case.     Pt tolerated procedure well and Dr. Miller discussed with family that pt was stable and in good condition after the procedure.    **NOTE: This patient IS NOT a candidate for trial of labor after  delivery.**      Delivery Information for  Sage Hazel    Birth information:  YOB: 2016   Time of birth: 12:42 PM   Sex: male   Head Delivery Date/Time: 3/21/2018 12:42 PM   Delivery type: , Low Transverse   Gestational Age: 37w0d    Delivery Providers    Delivering clinician:  Ndihi Miller MD   Provider Role    MD Cathy Dowling RN Julia B. Fitzmorris, RN Victoria F Crombie             Lindsay Measurements    Weight:  3220 g  Length:  50.2 cm  Head circumference:  33.7 cm  Chest circumference:  34.3 cm         Lindsay Assessment    Living status:  Living  Apgars:     1 Minute:   5 Minute:   10 Minute:   15 Minute:   20 Minute:     Skin Color:   0  1       Heart Rate:   2  2       Reflex Irritability:   2  2       Muscle Tone:   2  2       Respiratory Effort:   2  2       Total:   8  9               Apgars Assigned By:  DESTINY SANTOS RN         Assisted Delivery Details:    Forceps attempted?:  No  Vacuum extractor attempted?:  No         Shoulder Dystocia    Shoulder dystocia present?:  No           Presentation and Position    Presentation:  Vertex  Position:  Middle Occiput            Interventions/Resuscitation    Method:  Bulb Suctioning, Tactile Stimulation       Cord    Vessels:  3 vessels  Complications:  None  Delayed Cord Clamping?:  No  Cord Clamped Date/Time:  3/21/2018 12:42 PM  Cord Blood Disposition:  Sent with Baby  Gases Sent?:  No  Stem Cell Collection (by MD):  No       Placenta    Date and time:  3/21/2018 12:44 PM  Removal:  Manual removal  Appearance:  Intact  Placenta disposition:  discarded           Labor Events:       labor: No     Labor Onset Date/Time:         Dilation Complete Date/Time:         Start Pushing Date/Time:       Rupture Date/Time:              Rupture type:           Fluid Amount:        Fluid Color:        Fluid Odor:        Membrane Status (PeriCalm):        Rupture Date/Time (PeriCalm):        Fluid Amount (PeriCalm):        Fluid Color (PeriCalm):         steroids: None     Antibiotics given for GBS: No     Induction: none     Indications for induction:        Augmentation:       Indications for augmentation:       Labor complications: None     Additional complications:          Cervical ripening:                     Delivery:      Episiotomy: None     Indication for Episiotomy:       Perineal Lacerations: None Repaired:      Periurethral Laceration: none Repaired:     Labial Laceration: none Repaired:     Sulcus Laceration: none Repaired:     Vaginal Laceration: No Repaired:     Cervical Laceration: No Repaired:     Repair suture:       Repair # of packets:       Vaginal delivery QBL (mL):        QBL (mL): 0     Combined Blood Loss (mL): 0     Vaginal Sweep Performed: No     Surgicount Correct: Yes       Other providers:       Anesthesia    Method:  Spinal, Epidural          Details (if applicable):  Trial of Labor No    Categorization: Repeat    Priority: Routine   Indications for : Repeat Section   Incision Type: low transverse     Additional  information:  Forceps:    Vacuum:     Breech:    Observed anomalies    Other (Comments):         Kyrie Leigh MD  PGY-2 OB/GYN  [phone]

## 2018-03-21 NOTE — ASSESSMENT & PLAN NOTE
- Consents signed and to chart  - Admit to Labor and Delivery unit  - Epidural per Anesthesia  - Draw CBC, T&S  - Ancef OCTOR  - To OR for C/S. Case Request is in.   - Notify Staff  - Ultrasound performed, infant in vertex position.   - Post-Partum Hemorrhage risk - medium

## 2018-03-21 NOTE — ANESTHESIA PREPROCEDURE EVALUATION
2018  [facility]  Anesthesia Pre-Operative Evaluation         Patient Name: Jimena Hazel  YOB: 1980  MRN: [id number]    SUBJECTIVE:     Pre-operative evaluation for  section with bilateral tubal ligation     2018    Jimena Hazel is a 37 y.o. female  w/ a significant PMHx of DM1 and 2 c-sections who presents for the above procedure.    LDA:   Right wrist 18g PIV    Prev airway: None documented.    Drips: None documented.    Patient Active Problem List   Diagnosis    Type 1 diabetes mellitus with hyperglycemia    Long-term current use of insulin for diabetes mellitus    Diabetes type 1, uncontrolled    Metabolic acidosis with normal anion gap and bicarbonate losses    Hypophosphatemia    Hypokalemia    Uncontrolled type 1 diabetes mellitus with hypoglycemia, with long-term current use of insulin    14 weeks gestation of pregnancy    Health education/counseling    Blood glucose elevated    Type 1 diabetes mellitus complicating pregnancy in second trimester, antepartum    Insulin controlled gestational diabetes mellitus (GDM) in third trimester    34 weeks gestation of pregnancy    Diabetes in pregnancy    History of  section complicating pregnancy       Review of patient's allergies indicates:  No Known Allergies    Current Inpatient Medications:      Current Facility-Administered Medications on File Prior to Visit   Medication Dose Route Frequency Provider Last Rate Last Dose    carboprost injection 250 mcg  250 mcg Intramuscular On Call Procedure Lilibeth Davila MD        ceFAZolin injection 2 g  2 g Intravenous On Call Procedure Lilibeth Davila MD        citric acid-sodium citrate 500-334 mg/5 ml solution 30 mL  30 mL Oral On Call Procedure Lilibeth Davila MD        dextrose 50% injection 12.5 g  12.5 g Intravenous PRN Lilibeth  "ABBIE Davila MD        famotidine (PF) injection 20 mg  20 mg Intravenous On Call Procedure Lilibeth Davila MD        glucagon (human recombinant) injection 1 mg  1 mg Intramuscular PRN Lilibeth Davila MD        insulin aspart U-100 pen 1-10 Units  1-10 Units Subcutaneous Q6H PRN Lilibeth Davila MD   2 Units at 18 1118    lactated ringers bolus 1,000 mL  1,000 mL Intravenous PRN Lilibeth Davila MD   1,000 mL at 18 1107    lactated ringers infusion   Intravenous Continuous Lilibeth Davila MD        methylergonovine injection 200 mcg  200 mcg Intramuscular On Call Procedure Lilibeth Davila MD        miSOPROStol tablet 800 mcg  800 mcg Rectal On Call Procedure Lilibeth Davila MD        mupirocin 2 % ointment   Nasal On Call Procedure Lilibeth Davila MD         Current Outpatient Prescriptions on File Prior to Visit   Medication Sig Dispense Refill    acetaminophen (TYLENOL) 325 MG tablet Take 500 mg by mouth every 6 (six) hours as needed for Pain.      aspirin 81 MG Chew Take 1 tablet (81 mg total) by mouth once daily.  0    blood sugar diagnostic Strp Check glucose 8x/day 300 strip 11    glucagon (human recombinant) inj 1mg/mL kit Follow package directions for low blood sugar. 1 kit 1    insulin aspart U-100 (NOVOLOG) 100 unit/mL InPn pen Take 28 U with Breakfast; Take 28 U with Lunch, Take 16U with Dinner every day 19.8 mL 3    insulin detemir U-100 (LEVEMIR FLEXTOUCH) 100 unit/mL (3 mL) SubQ InPn pen Take 22 U each morning at breakfast. Take 48 U at bedtime with a small snack. 1 Box 3    pen needle, diabetic (BD INSULIN PEN NEEDLE UF MINI) 31 gauge x 3/16" Ndle To use 5-6x/day with insulin injections. 200 each 3       Past Surgical History:   Procedure Laterality Date     SECTION, LOW TRANSVERSE         Social History     Social History    Marital status:      Spouse name: N/A    Number of children: N/A    Years of education: N/A     Occupational History    Not on file. "     Social History Main Topics    Smoking status: Never Smoker    Smokeless tobacco: Never Used    Alcohol use No    Drug use: No    Sexual activity: Yes     Partners: Male     Birth control/ protection: None     Other Topics Concern    Not on file     Social History Narrative    . Father's name Ladarius. Baby is a bot named Dmitriy.Mom also has another son, named Will,14 yr.       OBJECTIVE:     Vital Signs Range (Last 24H):  Temp:  [35.8 °C (96.5 °F)]       CBC:   No results for input(s): WBC, RBC, HGB, HCT, PLT, MCV, MCH, MCHC in the last 72 hours.    CMP: No results for input(s): NA, K, CL, CO2, BUN, CREATININE, GLU, MG, PHOS, CALCIUM, ALBUMIN, PROT, ALKPHOS, ALT, AST, BILITOT in the last 72 hours.    INR:  No results for input(s): PT, INR, PROTIME, APTT in the last 72 hours.    Diagnostic Studies: No relevant studies.    EKG: No recent studies available.    2D ECHO:  No results found for this or any previous visit.      ASSESSMENT/PLAN:         Pre-op Assessment    I have reviewed the Patient Summary Reports.      I have reviewed the Medications.     Review of Systems  Anesthesia Hx:  Denies Hx of Anesthetic complications  History of prior surgery of interest to airway management or planning: Previous anesthesia: Spinal Denies Family Hx of Anesthesia complications.   Denies Personal Hx of Anesthesia complications.   Social:  Non-Smoker    Hematology/Oncology:     Oncology Normal     EENT/Dental:EENT/Dental Normal   Cardiovascular:  Cardiovascular Normal Exercise tolerance: good  Denies CAD.           Pulmonary:  Pulmonary Normal  Denies COPD.  Denies Asthma.    Hepatic/GI:  Hepatic/GI Normal  Denies GERD.    Musculoskeletal:  Musculoskeletal Normal    Neurological:  Neurology Normal Denies TIA.  Denies CVA. Denies Seizures.    Endocrine:   Diabetes, type 1    Dermatological:  Skin Normal    Psych:  Psychiatric Normal           Physical Exam  General:  Well nourished, Obesity     Airway/Jaw/Neck:  Airway Findings: Mouth Opening: Normal Tongue: Normal  General Airway Assessment: Adult  Mallampati: III  Improves to II with phonation.  TM Distance: Normal, at least 6 cm  Jaw/Neck Findings:  Limited Ability to Prognath      Dental:  Dental Findings: In tact   Chest/Lungs:  Chest/Lungs Clear    Heart/Vascular:  Heart Findings: Normal       Mental Status:  Mental Status Findings:  Cooperative, Alert and Oriented         Anesthesia Plan  Type of Anesthesia, risks & benefits discussed:  Anesthesia Type:  spinal, MAC, general, epidural, CSE  Patient's Preference:   Intra-op Monitoring Plan: standard ASA monitors  Intra-op Monitoring Plan Comments:   Post Op Pain Control Plan: multimodal analgesia  Post Op Pain Control Plan Comments:   Induction:   IV  Beta Blocker:  Patient is not currently on a Beta-Blocker (No further documentation required).       Informed Consent: Patient understands risks and agrees with Anesthesia plan.  Questions answered. Anesthesia consent signed with patient.  ASA Score: 3     Day of Surgery Review of History & Physical:            Ready For Surgery From Anesthesia Perspective.

## 2018-03-21 NOTE — LACTATION NOTE
03/21/18 [facility]   Maternal Infant Assessment   Breast Density soft;Bilateral:   Areola elastic;Bilateral:   Nipple(s) flat;Left:   Maternal Infant Feeding   Maternal Emotional State assist needed   Infant Positioning cross-cradle   Time Spent (min) 15-30 min   Lactation Interventions   Attachment Promotion breastfeeding assistance provided;counseling provided;face-to-face positioning promoted;family involvement promoted;infant-mother separation minimized;privacy provided;role responsibility promoted;rooming-in promoted;skin-to-skin contact encouraged   Breastfeeding Assistance assisted with positioning;feeding cue recognition promoted;support offered   Maternal Breastfeeding Support encouragement offered;lactation counseling provided;maternal hydration promoted;maternal rest encouraged   Praised patient for breastfeeding; baby sleepy, showing feeding cues; with patient's permission assisted with breastfeeding; baby sleepy at breast, did not latch; remained skin to skin; provided basic lactation education; started use of Mother's Breastfeeding Guide; support and encouragement provideid;

## 2018-03-21 NOTE — HPI
Jimena Hazel is a 37 y.o. [id number] at 37w0d presents as scheduled for repeat  section with BTL. Patient feels well today, no complaints. Denies VB, LOF, contractions. Good FM.      This IUP is complicated by type 1 DM, on basal/bolus insulin, as well as  x2.

## 2018-03-21 NOTE — H&P
"[facility]  Obstetrics  History & Physical    Patient Name: Jimena Hazel  MRN: [id number]  Admission Date: 3/21/2018  Primary Care Provider: Levi Doyle MD    Subjective:     Principal Problem:Type 1 diabetes mellitus complicating pregnancy in second trimester, antepartum    History of Present Illness:  Jimena Hazel is a 37 y.o. [id number] at 37w0d presents as scheduled for repeat  section with BTL. Patient feels well today, no complaints. Denies VB, LOF, contractions. Good FM.      This IUP is complicated by type 1 DM, on basal/bolus insulin, as well as  x2.        Obstetric History       T2      L2     SAB0   TAB0   Ectopic0   Multiple0   Live Births0       # Outcome Date GA Lbr Sunny/2nd Weight Sex Delivery Anes PTL Lv   3 Current            2 Term 11 40w0d  4.479 kg (9 lb 14 oz) M CS-Unspec EPI        Name: Agnieszka Rios Term 03 42w0d  3.515 kg (7 lb 12 oz) M CS-Unspec EPI N       Name: Will        Past Medical History:   Diagnosis Date    Diabetes mellitus     Diabetes mellitus type I     Irregular heartbeat      Past Surgical History:   Procedure Laterality Date     SECTION, LOW TRANSVERSE         PTA Medications   Medication Sig    acetaminophen (TYLENOL) 325 MG tablet Take 500 mg by mouth every 6 (six) hours as needed for Pain.    aspirin 81 MG Chew Take 1 tablet (81 mg total) by mouth once daily.    blood sugar diagnostic Strp Check glucose 8x/day    glucagon (human recombinant) inj 1mg/mL kit Follow package directions for low blood sugar.    insulin aspart U-100 (NOVOLOG) 100 unit/mL InPn pen Take 28 U with Breakfast; Take 28 U with Lunch, Take 16U with Dinner every day    insulin detemir U-100 (LEVEMIR FLEXTOUCH) 100 unit/mL (3 mL) SubQ InPn pen Take 22 U each morning at breakfast. Take 48 U at bedtime with a small snack.    pen needle, diabetic (BD INSULIN PEN NEEDLE UF MINI) 31 gauge x 3/16" Ndle To use 5-6x/day with " insulin injections.       Review of patient's allergies indicates:  No Known Allergies     Family History     Problem Relation (Age of Onset)    Diabetes Mother, Father    Hypertension Mother    No Known Problems Sister, Brother        Social History Main Topics    Smoking status: Never Smoker    Smokeless tobacco: Never Used    Alcohol use No    Drug use: No    Sexual activity: Yes     Partners: Male     Birth control/ protection: None     Review of Systems   Constitutional: Negative for chills and fever.   Eyes: Negative for visual disturbance.   Respiratory: Negative for shortness of breath.    Cardiovascular: Negative for chest pain and palpitations.   Gastrointestinal: Negative for abdominal pain, nausea and vomiting.   Genitourinary: Negative for vaginal bleeding and vaginal discharge.   Musculoskeletal: Negative for back pain.   Neurological: Negative for seizures, syncope and headaches.   Hematological: Does not bruise/bleed easily.   Psychiatric/Behavioral: The patient is not nervous/anxious.       Objective:     Vital Signs (Most Recent):  Temp: 96.5 °F (35.8 °C) (03/21/18 1057) Vital Signs (24h Range):  Temp:  [96.5 °F (35.8 °C)] 96.5 °F (35.8 °C)     Weight: 95.3 kg (210 lb)  Body mass index is 34.95 kg/m².    FHT: 140 Cat 1 (reassuring)  TOCO: irregular, not feeling them    Physical Exam:   Constitutional: She is oriented to person, place, and time. She appears well-developed and well-nourished. No distress.       Cardiovascular: Normal rate and regular rhythm.     Pulmonary/Chest: Effort normal. No respiratory distress.        Abdominal: Soft. She exhibits no distension. There is no tenderness.   gravid             Musculoskeletal: Moves all extremeties. She exhibits no edema.       Neurological: She is alert and oriented to person, place, and time.    Skin: Skin is warm and dry.    Psychiatric: She has a normal mood and affect. Her behavior is normal.       Cervix: deferred  Presentation: Vertex      Significant Labs:  Lab Results   Component Value Date    GROUPTRH A POS 2018    HEPBSAG Negative 2017    STREPBCULT  03/15/2018     STREPTOCOCCUS AGALACTIAE (GROUP B)  Beta-hemolytic streptococci are routinely susceptible to   penicillins,cephalosporins and carbapenems.         I have personallly reviewed all pertinent lab results from the last 24 hours.     Rubella: Immune  RPR: NR   HIV: negative  HepB: negative        Assessment/Plan:     37 y.o. female  at 37w0d for:    * Type 1 diabetes mellitus complicating pregnancy in second trimester, antepartum    - indication for delivery at 37 weeks  - on basal-bolus insulin at home   Aspart 28/28/16u w/ meals   detemir 22 u in AM, 48 u in PM  - follows with endocrinologist, planning to transition to insulin pump after delivery  - admit accucheck 159  - SSI for coverage preop        Unwanted fertility    - planning for BTL with C/S, consents signed        History of  section complicating pregnancy    - Consents signed and to chart  - Admit to Labor and Delivery unit  - Epidural per Anesthesia  - Draw CBC, T&S  - Ancef OCTOR  - To OR for C/S. Case Request is in.   - Notify Staff  - Ultrasound performed, infant in vertex position.   - Post-Partum Hemorrhage risk - medium            Lilibeth Davila MD  Obstetrics  [facility]

## 2018-03-22 LAB
BASOPHILS # BLD AUTO: 0.01 K/UL
BASOPHILS NFR BLD: 0.1 %
DIFFERENTIAL METHOD: ABNORMAL
EOSINOPHIL # BLD AUTO: 0 K/UL
EOSINOPHIL NFR BLD: 0.4 %
ERYTHROCYTE [DISTWIDTH] IN BLOOD BY AUTOMATED COUNT: 13.4 %
HCT VFR BLD AUTO: 27.5 %
HGB BLD-MCNC: 9.2 G/DL
LYMPHOCYTES # BLD AUTO: 1 K/UL
LYMPHOCYTES NFR BLD: 13.9 %
MCH RBC QN AUTO: 30.2 PG
MCHC RBC AUTO-ENTMCNC: 33.5 G/DL
MCV RBC AUTO: 90 FL
MONOCYTES # BLD AUTO: 0.4 K/UL
MONOCYTES NFR BLD: 5 %
NEUTROPHILS # BLD AUTO: 5.7 K/UL
NEUTROPHILS NFR BLD: 80.5 %
PLATELET # BLD AUTO: 142 K/UL
PMV BLD AUTO: 9.4 FL
POCT GLUCOSE: 110 MG/DL (ref 70–110)
POCT GLUCOSE: 186 MG/DL (ref 70–110)
POCT GLUCOSE: 204 MG/DL (ref 70–110)
POCT GLUCOSE: 212 MG/DL (ref 70–110)
POCT GLUCOSE: 78 MG/DL (ref 70–110)
POCT GLUCOSE: 93 MG/DL (ref 70–110)
RBC # BLD AUTO: 3.05 M/UL
WBC # BLD AUTO: 7.03 K/UL

## 2018-03-22 PROCEDURE — 63600175 PHARM REV CODE 636 W HCPCS: Performed by: STUDENT IN AN ORGANIZED HEALTH CARE EDUCATION/TRAINING PROGRAM

## 2018-03-22 PROCEDURE — 36415 COLL VENOUS BLD VENIPUNCTURE: CPT

## 2018-03-22 PROCEDURE — 88302 TISSUE EXAM BY PATHOLOGIST: CPT | Performed by: PATHOLOGY

## 2018-03-22 PROCEDURE — 11000001 HC ACUTE MED/SURG PRIVATE ROOM

## 2018-03-22 PROCEDURE — 85025 COMPLETE CBC W/AUTO DIFF WBC: CPT

## 2018-03-22 PROCEDURE — 88302 TISSUE EXAM BY PATHOLOGIST: CPT | Mod: 26,,, | Performed by: PATHOLOGY

## 2018-03-22 PROCEDURE — 25000003 PHARM REV CODE 250: Performed by: OBSTETRICS & GYNECOLOGY

## 2018-03-22 RX ORDER — IBUPROFEN 200 MG
24 TABLET ORAL
Status: DISCONTINUED | OUTPATIENT
Start: 2018-03-22 | End: 2018-03-24 | Stop reason: HOSPADM

## 2018-03-22 RX ORDER — IBUPROFEN 600 MG/1
600 TABLET ORAL EVERY 6 HOURS
Status: DISCONTINUED | OUTPATIENT
Start: 2018-03-22 | End: 2018-03-24 | Stop reason: HOSPADM

## 2018-03-22 RX ORDER — HYDROCODONE BITARTRATE AND ACETAMINOPHEN 10; 325 MG/1; MG/1
1 TABLET ORAL EVERY 4 HOURS PRN
Status: DISCONTINUED | OUTPATIENT
Start: 2018-03-22 | End: 2018-03-24 | Stop reason: HOSPADM

## 2018-03-22 RX ORDER — HYDROCODONE BITARTRATE AND ACETAMINOPHEN 5; 325 MG/1; MG/1
1 TABLET ORAL EVERY 4 HOURS PRN
Status: DISCONTINUED | OUTPATIENT
Start: 2018-03-22 | End: 2018-03-24 | Stop reason: HOSPADM

## 2018-03-22 RX ORDER — INSULIN ASPART 100 [IU]/ML
1-10 INJECTION, SOLUTION INTRAVENOUS; SUBCUTANEOUS
Status: DISCONTINUED | OUTPATIENT
Start: 2018-03-22 | End: 2018-03-24 | Stop reason: HOSPADM

## 2018-03-22 RX ORDER — IBUPROFEN 200 MG
16 TABLET ORAL
Status: DISCONTINUED | OUTPATIENT
Start: 2018-03-22 | End: 2018-03-24 | Stop reason: HOSPADM

## 2018-03-22 RX ORDER — GLUCAGON 1 MG
1 KIT INJECTION
Status: DISCONTINUED | OUTPATIENT
Start: 2018-03-22 | End: 2018-03-24 | Stop reason: HOSPADM

## 2018-03-22 RX ADMIN — INSULIN ASPART 14 UNITS: 100 INJECTION, SOLUTION INTRAVENOUS; SUBCUTANEOUS at 09:03

## 2018-03-22 RX ADMIN — INSULIN ASPART 16 UNITS: 100 INJECTION, SOLUTION INTRAVENOUS; SUBCUTANEOUS at 07:03

## 2018-03-22 RX ADMIN — INSULIN DETEMIR 24 UNITS: 100 INJECTION, SOLUTION SUBCUTANEOUS at 10:03

## 2018-03-22 RX ADMIN — HYDROCODONE BITARTRATE AND ACETAMINOPHEN 1 TABLET: 5; 325 TABLET ORAL at 06:03

## 2018-03-22 RX ADMIN — INSULIN ASPART 2 UNITS: 100 INJECTION, SOLUTION INTRAVENOUS; SUBCUTANEOUS at 10:03

## 2018-03-22 RX ADMIN — IBUPROFEN 600 MG: 600 TABLET, FILM COATED ORAL at 05:03

## 2018-03-22 RX ADMIN — INSULIN ASPART 14 UNITS: 100 INJECTION, SOLUTION INTRAVENOUS; SUBCUTANEOUS at 04:03

## 2018-03-22 RX ADMIN — IBUPROFEN 600 MG: 600 TABLET, FILM COATED ORAL at 12:03

## 2018-03-22 RX ADMIN — IBUPROFEN 600 MG: 600 TABLET, FILM COATED ORAL at 06:03

## 2018-03-22 RX ADMIN — INSULIN DETEMIR 11 UNITS: 100 INJECTION, SOLUTION SUBCUTANEOUS at 09:03

## 2018-03-22 RX ADMIN — HYDROCODONE BITARTRATE AND ACETAMINOPHEN 1 TABLET: 10; 325 TABLET ORAL at 10:03

## 2018-03-22 RX ADMIN — HYDROCODONE BITARTRATE AND ACETAMINOPHEN 1 TABLET: 10; 325 TABLET ORAL at 06:03

## 2018-03-22 NOTE — PROGRESS NOTES
[facility]  Obstetrics  Postpartum Progress Note    Patient Name: Jimena Hazel  MRN: [id number]  Admission Date: 3/21/2018  Hospital Length of Stay: 1 days  Attending Physician: Nidhi Miller MD  Primary Care Provider: Levi Doyle MD    Subjective:     Principal Problem:Type 1 diabetes mellitus complicating pregnancy in second trimester, antepartum    Hospital course: 3/21/18: Patient presented for RTLCS/BTL. Proceeded with no complications.   3/22/18: POD #1: Patient is doing well, meeting postoperative goals. No acute events. Pain controlled. Blood sugar well controlled on half of pregnancy regimen    Interval History: POD #1    She is doing well this morning. She is tolerating a regular diet without nausea or vomiting. She is voiding spontaneously. She is ambulating. She has passed flatus, and has not a BM. Vaginal bleeding is mild. She denies fever or chills. Abdominal pain is moderate and controlled with oral medications. She is not breastfeeding. She desires circumcision for her male baby: yes.    Objective:     Vital Signs (Most Recent):  Temp: 98 °F (36.7 °C) (03/22/18 0400)  Pulse: 82 (03/22/18 0400)  Resp: 18 (03/22/18 0400)  BP: 105/72 (03/22/18 0400)  SpO2: 98 % (03/22/18 0400) Vital Signs (24h Range):  Temp:  [95.4 °F (35.2 °C)-98.3 °F (36.8 °C)] 98 °F (36.7 °C)  Pulse:  [74-95] 82  Resp:  [16-18] 18  SpO2:  [87 %-100 %] 98 %  BP: (105-131)/(72-94) 105/72     Weight: 95.3 kg (210 lb)  Body mass index is 34.95 kg/m².      Intake/Output Summary (Last 24 hours) at 03/22/18 0650  Last data filed at 03/21/18 1550   Gross per 24 hour   Intake          2345.83 ml   Output              275 ml   Net          2070.83 ml       Significant Labs:  Lab Results   Component Value Date    GROUPTRH A POS 03/21/2018    HEPBSAG Negative 09/18/2017    STREPBCULT  03/15/2018     STREPTOCOCCUS AGALACTIAE (GROUP B)  Beta-hemolytic streptococci are routinely susceptible to    penicillins,cephalosporins and carbapenems.         Recent Labs  Lab 03/21/18  1116   HGB 11.2*   HCT 33.6*       Recent Lab Results       03/21/18  2100 03/21/18  1930 03/21/18  1718 03/21/18  1621 03/21/18  1358      Baso #          Basophil%          Differential Method          Eos #          Eosinophil%          Gran # (ANC)          Gran%          Group & Rh          Hematocrit          Hemoglobin          INDIRECT BERNADETTE          Lymph #          Lymph%          MCH          MCHC          MCV          Mono #          Mono%          MPV          Platelets          POC Glucose 187  Comment:  Done by GENNARO Medina(A)         POCT Glucose  87 85 50(LL) 78     RBC          RDW          WBC                      03/21/18  1116 03/21/18  1052      Baso # 0.00      Basophil% 0.0      Differential Method Automated      Eos # 0.0      Eosinophil% 0.2      Gran # (ANC) 4.7      Gran% 73.2(H)      Group & Rh A POS      Hematocrit 33.6(L)      Hemoglobin 11.2(L)      INDIRECT BERNADETTE NEG      Lymph # 1.5      Lymph% 23.8      MCH 30.4      MCHC 33.3      MCV 91      Mono # 0.2(L)      Mono% 2.6(L)      MPV 9.9      Platelets 160      POC Glucose       POCT Glucose  159(H)     RBC 3.68(L)      RDW 13.4      WBC 6.47            Physical Exam:   Constitutional: She is oriented to person, place, and time. She appears well-developed and well-nourished. No distress.    HENT:   Head: Atraumatic.    Eyes: EOM are normal. Pupils are equal, round, and reactive to light.    Neck: Normal range of motion. Neck supple.    Cardiovascular: Normal rate and regular rhythm.     Pulmonary/Chest: Effort normal. No respiratory distress. She has no wheezes.        Abdominal: Soft. She exhibits abdominal incision (c/d/i). She exhibits no distension. There is no tenderness.             Musculoskeletal: Normal range of motion. She exhibits no edema or tenderness.       Neurological: She is alert and oriented to person, place, and time. She has  normal reflexes.    Skin: Skin is warm and dry. She is not diaphoretic.        Assessment/Plan:     37 y.o. female  for:    * Type 1 diabetes mellitus complicating pregnancy in second trimester, antepartum    - Aspart  w/ meals  - Detemir   - follows with endocrinologist, planning to transition to insulin pump after delivery        Status post repeat low transverse  section    1. Postpartum care:  - Patient doing well. Continue routine management and advances.  - Continue PO pain meds. Pain well controlled.  - Heme: H/h 10/33 > 10/31  - Encourage ambulation  - Circumcision desired. Consents signed, order placed.  - Contraception s/p BTL  - Lactation PRN Lactation consult            Disposition: As patient meets milestones, will plan to discharge POD #3-4.    Everton Leigh MD  Obstetrics  [facility]

## 2018-03-22 NOTE — ASSESSMENT & PLAN NOTE
- Aspart 14/14/8 w/ meals  - Detemir 11/24  - follows with endocrinologist, planning to transition to insulin pump after delivery

## 2018-03-22 NOTE — HOSPITAL COURSE
3/21/18: Patient presented for RTLCS/BTL. Proceeded with no complications.   3/22/18: POD #1: Patient is doing well, meeting postoperative goals. No acute events. Pain controlled. Started on half of pregnancy insulin regiment  03/23/2018 POD #2: Pt continues to do well. No complaints.   3/24/18 POD#3. Continues to meet PP milestones. Stable for discharge. Has meeting with endocrinologist to set up insulin pump.

## 2018-03-22 NOTE — SUBJECTIVE & OBJECTIVE
Hospital course: 3/21/18: Patient presented for RTLCS/BTL. Proceeded with no complications.   3/22/18: POD #1: Patient is doing well, meeting postoperative goals. No acute events. Pain controlled. Blood sugar well controlled on half of pregnancy regimen    Interval History: POD #1    She is doing well this morning. She is tolerating a regular diet without nausea or vomiting. She is voiding spontaneously. She is ambulating. She has passed flatus, and has not a BM. Vaginal bleeding is mild. She denies fever or chills. Abdominal pain is moderate and controlled with oral medications. She is not breastfeeding. She desires circumcision for her male baby: yes.    Objective:     Vital Signs (Most Recent):  Temp: 98 °F (36.7 °C) (03/22/18 0400)  Pulse: 82 (03/22/18 0400)  Resp: 18 (03/22/18 0400)  BP: 105/72 (03/22/18 0400)  SpO2: 98 % (03/22/18 0400) Vital Signs (24h Range):  Temp:  [95.4 °F (35.2 °C)-98.3 °F (36.8 °C)] 98 °F (36.7 °C)  Pulse:  [74-95] 82  Resp:  [16-18] 18  SpO2:  [87 %-100 %] 98 %  BP: (105-131)/(72-94) 105/72     Weight: 95.3 kg (210 lb)  Body mass index is 34.95 kg/m².      Intake/Output Summary (Last 24 hours) at 03/22/18 0650  Last data filed at 03/21/18 1550   Gross per 24 hour   Intake          2345.83 ml   Output              275 ml   Net          2070.83 ml       Significant Labs:  Lab Results   Component Value Date    GROUPTRH A POS 03/21/2018    HEPBSAG Negative 09/18/2017    STREPBCULT  03/15/2018     STREPTOCOCCUS AGALACTIAE (GROUP B)  Beta-hemolytic streptococci are routinely susceptible to   penicillins,cephalosporins and carbapenems.         Recent Labs  Lab 03/21/18  1116   HGB 11.2*   HCT 33.6*       Recent Lab Results       03/21/18  2100 03/21/18  1930 03/21/18  1718 03/21/18  1621 03/21/18  1358      Baso #          Basophil%          Differential Method          Eos #          Eosinophil%          Gran # (ANC)          Gran%          Group & Rh          Hematocrit          Hemoglobin           INDIRECT BERNADETTE          Lymph #          Lymph%          MCH          MCHC          MCV          Mono #          Mono%          MPV          Platelets          POC Glucose 187  Comment:  Done by GENNARO Medina(A)         POCT Glucose  87 85 50(LL) 78     RBC          RDW          WBC                      03/21/18  1116 03/21/18  1052      Baso # 0.00      Basophil% 0.0      Differential Method Automated      Eos # 0.0      Eosinophil% 0.2      Gran # (ANC) 4.7      Gran% 73.2(H)      Group & Rh A POS      Hematocrit 33.6(L)      Hemoglobin 11.2(L)      INDIRECT BERNADETTE NEG      Lymph # 1.5      Lymph% 23.8      MCH 30.4      MCHC 33.3      MCV 91      Mono # 0.2(L)      Mono% 2.6(L)      MPV 9.9      Platelets 160      POC Glucose       POCT Glucose  159(H)     RBC 3.68(L)      RDW 13.4      WBC 6.47            Physical Exam:   Constitutional: She is oriented to person, place, and time. She appears well-developed and well-nourished. No distress.    HENT:   Head: Atraumatic.    Eyes: EOM are normal. Pupils are equal, round, and reactive to light.    Neck: Normal range of motion. Neck supple.    Cardiovascular: Normal rate and regular rhythm.     Pulmonary/Chest: Effort normal. No respiratory distress. She has no wheezes.        Abdominal: Soft. She exhibits abdominal incision (c/d/i). She exhibits no distension. There is no tenderness.             Musculoskeletal: Normal range of motion. She exhibits no edema or tenderness.       Neurological: She is alert and oriented to person, place, and time. She has normal reflexes.    Skin: Skin is warm and dry. She is not diaphoretic.

## 2018-03-22 NOTE — ASSESSMENT & PLAN NOTE
1. Postpartum care:  - Patient doing well. Continue routine management and advances.  - Continue PO pain meds. Pain well controlled.  - Heme: H/h 10/33 > 10/31  - Encourage ambulation  - Circumcision desired. Consents signed, order placed.  - Contraception s/p BTL  - Lactation PRN Lactation consult

## 2018-03-22 NOTE — LACTATION NOTE
03/22/18 1050   Maternal Infant Assessment   Breast Density soft;Bilateral:   Areola elastic;Bilateral:   Nipple(s) (arianne with stimulation)   Pain/Comfort Assessments   Pain Assessment Performed Yes       Number Scale   Presence of Pain denies  (while breastpumping or hand expressing)   Location - Side Bilateral   Location nipple(s)   Pain Rating: Activity 0   Equipment Type/Education   Breast Pump Type double electric, hospital grade   Breast Pump Flange Type hard   Breast Pump Flange Size 24 mm   Lactation Interventions   Attachment Promotion counseling provided;family involvement promoted;privacy provided;role responsibility promoted;rooming-in promoted;skin-to-skin contact encouraged   Breastfeeding Assistance electric breast pump used;milk expression/pumping;support offered   Maternal Breastfeeding Support encouragement offered;lactation counseling provided;maternal hydration promoted;maternal nutrition promoted;maternal rest encouraged   Patient told me she has attempted to breastfeed baby a few times but he has not latched; praised; offered assistance with breastfeeding; with patient's permission initiated use of breastpump for breast stimulation; advised her on imagery, relaxation and breastpumping techniques to facilitate milk transfer;  Verbal and written education in conjunction with showing her images of hand expression were used to teach patient how to hand express colostrum;  She demonstrated the techniques;  no colostrum expressed either with use of breastpump or with hand expression;  support and encouragement provided;

## 2018-03-22 NOTE — PLAN OF CARE
Problem: Patient Care Overview  Goal: Plan of Care Review  Outcome: Ongoing (interventions implemented as appropriate)   Requested patient call lactation for assistance with breastfeeding, breastpumping or hand expression; developed the following breastfeeding plan of care with patient: she will  breastfeed baby on cue until content at least 8 times in 24 hours observing for signs of milk transfer; she will wake baby prn; she will use breastpump pc for 15 minutes per breast; she will hand express colostrum after using breastpump; she will supplement baby with EBM/ABM until content;

## 2018-03-23 LAB
POCT GLUCOSE: 53 MG/DL (ref 70–110)
POCT GLUCOSE: 83 MG/DL (ref 70–110)
POCT GLUCOSE: 90 MG/DL (ref 70–110)
POCT GLUCOSE: 92 MG/DL (ref 70–110)

## 2018-03-23 PROCEDURE — 25000003 PHARM REV CODE 250: Performed by: OBSTETRICS & GYNECOLOGY

## 2018-03-23 PROCEDURE — 11000001 HC ACUTE MED/SURG PRIVATE ROOM

## 2018-03-23 PROCEDURE — 63600175 PHARM REV CODE 636 W HCPCS: Performed by: STUDENT IN AN ORGANIZED HEALTH CARE EDUCATION/TRAINING PROGRAM

## 2018-03-23 PROCEDURE — 25000003 PHARM REV CODE 250: Performed by: STUDENT IN AN ORGANIZED HEALTH CARE EDUCATION/TRAINING PROGRAM

## 2018-03-23 RX ORDER — HYDROCODONE BITARTRATE AND ACETAMINOPHEN 5; 325 MG/1; MG/1
1 TABLET ORAL EVERY 4 HOURS PRN
Qty: 20 TABLET | Refills: 0 | Status: SHIPPED | OUTPATIENT
Start: 2018-03-23 | End: 2019-10-08

## 2018-03-23 RX ORDER — INSULIN ASPART 100 [IU]/ML
INJECTION, SOLUTION INTRAVENOUS; SUBCUTANEOUS
Qty: 19.8 ML | Refills: 3 | Status: SHIPPED | OUTPATIENT
Start: 2018-03-23

## 2018-03-23 RX ORDER — IBUPROFEN 600 MG/1
600 TABLET ORAL EVERY 6 HOURS
Qty: 30 TABLET | Refills: 3 | Status: SHIPPED | OUTPATIENT
Start: 2018-03-23 | End: 2019-10-08

## 2018-03-23 RX ADMIN — HYDROCODONE BITARTRATE AND ACETAMINOPHEN 1 TABLET: 10; 325 TABLET ORAL at 09:03

## 2018-03-23 RX ADMIN — HYDROCODONE BITARTRATE AND ACETAMINOPHEN 1 TABLET: 5; 325 TABLET ORAL at 06:03

## 2018-03-23 RX ADMIN — INSULIN DETEMIR 24 UNITS: 100 INJECTION, SOLUTION SUBCUTANEOUS at 10:03

## 2018-03-23 RX ADMIN — IBUPROFEN 600 MG: 600 TABLET, FILM COATED ORAL at 06:03

## 2018-03-23 RX ADMIN — INSULIN ASPART 16 UNITS: 100 INJECTION, SOLUTION INTRAVENOUS; SUBCUTANEOUS at 06:03

## 2018-03-23 RX ADMIN — HYDROCODONE BITARTRATE AND ACETAMINOPHEN 1 TABLET: 5; 325 TABLET ORAL at 12:03

## 2018-03-23 RX ADMIN — IBUPROFEN 600 MG: 600 TABLET, FILM COATED ORAL at 01:03

## 2018-03-23 RX ADMIN — DOCUSATE SODIUM 200 MG: 100 CAPSULE, LIQUID FILLED ORAL at 09:03

## 2018-03-23 RX ADMIN — HYDROCODONE BITARTRATE AND ACETAMINOPHEN 1 TABLET: 5; 325 TABLET ORAL at 02:03

## 2018-03-23 RX ADMIN — INSULIN DETEMIR 11 UNITS: 100 INJECTION, SOLUTION SUBCUTANEOUS at 08:03

## 2018-03-23 RX ADMIN — IBUPROFEN 600 MG: 600 TABLET, FILM COATED ORAL at 12:03

## 2018-03-23 RX ADMIN — INSULIN ASPART 14 UNITS: 100 INJECTION, SOLUTION INTRAVENOUS; SUBCUTANEOUS at 08:03

## 2018-03-23 RX ADMIN — INSULIN ASPART 14 UNITS: 100 INJECTION, SOLUTION INTRAVENOUS; SUBCUTANEOUS at 02:03

## 2018-03-23 NOTE — SUBJECTIVE & OBJECTIVE
Hospital course: 3/21/18: Patient presented for RTLCS/BTL. Proceeded with no complications.   3/22/18: POD #1: Patient is doing well, meeting postoperative goals. No acute events. Pain controlled. Started on half of pregnancy insulin regiment  03/23/2018 POD #2: Pt continues to do well. No complaints. Required 2 units of SSI for hyperglycemia overnight.    Interval History:    She is doing well this morning. She is tolerating a regular diet without nausea or vomiting. She is voiding spontaneously. She is ambulating. She has passed flatus, and has not a BM. Vaginal bleeding is mild. She denies fever or chills. Abdominal pain is moderate and controlled with oral medications. She is not breastfeeding. She desires circumcision for her male baby: done.    Objective:     Vital Signs (Most Recent):  Temp: 98.4 °F (36.9 °C) (03/22/18 2235)  Pulse: 88 (03/22/18 2235)  Resp: 18 (03/22/18 2235)  BP: 117/66 (03/22/18 2235)  SpO2: 97 % (03/22/18 2235) Vital Signs (24h Range):  Temp:  [97.5 °F (36.4 °C)-98.4 °F (36.9 °C)] 98.4 °F (36.9 °C)  Pulse:  [85-96] 88  Resp:  [18] 18  SpO2:  [97 %-98 %] 97 %  BP: (101-117)/(59-74) 117/66     Weight: 95.3 kg (210 lb)  Body mass index is 34.95 kg/m².      Intake/Output Summary (Last 24 hours) at 03/23/18 0706  Last data filed at 03/22/18 1430   Gross per 24 hour   Intake                0 ml   Output              800 ml   Net             -800 ml       Significant Labs:  Lab Results   Component Value Date    GROUPTRH A POS 03/21/2018    HEPBSAG Negative 09/18/2017    STREPBCULT  03/15/2018     STREPTOCOCCUS AGALACTIAE (GROUP B)  Beta-hemolytic streptococci are routinely susceptible to   penicillins,cephalosporins and carbapenems.         Recent Labs  Lab 03/22/18  0955   HGB 9.2*   HCT 27.5*       Recent Lab Results       03/22/18  2211 03/22/18  1921 03/22/18  1422 03/22/18  0955 03/22/18  0946      Baso #    0.01      Basophil%    0.1      Differential Method    Automated      Eos #    0.0       Eosinophil%    0.4      Gran # (ANC)    5.7      Gran%    80.5(H)      Hematocrit    27.5(L)      Hemoglobin    9.2(L)      Lymph #    1.0      Lymph%    13.9(L)      MCH    30.2      MCHC    33.5      MCV    90      Mono #    0.4      Mono%    5.0      MPV    9.4      Platelets    142(L)      POCT Glucose 204(H) 212(H) 110  93     RBC    3.05(L)      RDW    13.4      WBC    7.03                      Physical Exam:   Constitutional: She is oriented to person, place, and time. She appears well-developed and well-nourished. No distress.    HENT:   Head: Atraumatic.    Eyes: EOM are normal. Pupils are equal, round, and reactive to light.    Neck: Normal range of motion. Neck supple.    Cardiovascular: Normal rate and regular rhythm.     Pulmonary/Chest: Effort normal. No respiratory distress. She has no wheezes.        Abdominal: Soft. She exhibits abdominal incision (c/d/i). She exhibits no distension. There is no tenderness.   Fundus firm at umbilicus              Musculoskeletal: Normal range of motion. She exhibits no edema or tenderness.       Neurological: She is alert and oriented to person, place, and time. She has normal reflexes.    Skin: Skin is warm and dry. She is not diaphoretic.

## 2018-03-23 NOTE — PROGRESS NOTES
Dr. Leigh notified of pt blood sugar of 53. MD okay to still give Aspart Insulin 14 units with lunch. Will continue to monitor.

## 2018-03-23 NOTE — PROGRESS NOTES
[facility]  Obstetrics  Postpartum Progress Note    Patient Name: Jimena Hazel  MRN: [id number]  Admission Date: 3/21/2018  Hospital Length of Stay: 2 days  Attending Physician: Nidhi Miller MD  Primary Care Provider: Levi Doyle MD    Subjective:     Principal Problem:Type 1 diabetes mellitus complicating pregnancy in second trimester, antepartum    Hospital course: 3/21/18: Patient presented for RTLCS/BTL. Proceeded with no complications.   3/22/18: POD #1: Patient is doing well, meeting postoperative goals. No acute events. Pain controlled. Started on half of pregnancy insulin regiment  03/23/2018 POD #2: Pt continues to do well. No complaints. Required 2 units of SSI for hyperglycemia overnight.    Interval History:    She is doing well this morning. She is tolerating a regular diet without nausea or vomiting. She is voiding spontaneously. She is ambulating. She has passed flatus, and has not a BM. Vaginal bleeding is mild. She denies fever or chills. Abdominal pain is moderate and controlled with oral medications. She is not breastfeeding. She desires circumcision for her male baby: done.    Objective:     Vital Signs (Most Recent):  Temp: 98.4 °F (36.9 °C) (03/22/18 2235)  Pulse: 88 (03/22/18 2235)  Resp: 18 (03/22/18 2235)  BP: 117/66 (03/22/18 2235)  SpO2: 97 % (03/22/18 2235) Vital Signs (24h Range):  Temp:  [97.5 °F (36.4 °C)-98.4 °F (36.9 °C)] 98.4 °F (36.9 °C)  Pulse:  [85-96] 88  Resp:  [18] 18  SpO2:  [97 %-98 %] 97 %  BP: (101-117)/(59-74) 117/66     Weight: 95.3 kg (210 lb)  Body mass index is 34.95 kg/m².      Intake/Output Summary (Last 24 hours) at 03/23/18 0706  Last data filed at 03/22/18 1430   Gross per 24 hour   Intake                0 ml   Output              800 ml   Net             -800 ml       Significant Labs:  Lab Results   Component Value Date    GROUPTRH A POS 03/21/2018    HEPBSAG Negative 09/18/2017    STREPBCULT  03/15/2018     STREPTOCOCCUS  AGALACTIAE (GROUP B)  Beta-hemolytic streptococci are routinely susceptible to   penicillins,cephalosporins and carbapenems.         Recent Labs  Lab 18  0955   HGB 9.2*   HCT 27.5*       Recent Lab Results       18  2211 18  1921 18  1422 18  0955 18  0946      Baso #    0.01      Basophil%    0.1      Differential Method    Automated      Eos #    0.0      Eosinophil%    0.4      Gran # (ANC)    5.7      Gran%    80.5(H)      Hematocrit    27.5(L)      Hemoglobin    9.2(L)      Lymph #    1.0      Lymph%    13.9(L)      MCH    30.2      MCHC    33.5      MCV    90      Mono #    0.4      Mono%    5.0      MPV    9.4      Platelets    142(L)      POCT Glucose 204(H) 212(H) 110  93     RBC    3.05(L)      RDW    13.4      WBC    7.03                      Physical Exam:   Constitutional: She is oriented to person, place, and time. She appears well-developed and well-nourished. No distress.    HENT:   Head: Atraumatic.    Eyes: EOM are normal. Pupils are equal, round, and reactive to light.    Neck: Normal range of motion. Neck supple.    Cardiovascular: Normal rate and regular rhythm.     Pulmonary/Chest: Effort normal. No respiratory distress. She has no wheezes.        Abdominal: Soft. She exhibits abdominal incision (c/d/i). She exhibits no distension. There is no tenderness.   Fundus firm at umbilicus              Musculoskeletal: Normal range of motion. She exhibits no edema or tenderness.       Neurological: She is alert and oriented to person, place, and time. She has normal reflexes.    Skin: Skin is warm and dry. She is not diaphoretic.        Assessment/Plan:     37 y.o. female  for:    * Type 1 diabetes mellitus complicating pregnancy in second trimester, antepartum    - Aspart  w/ meals  - Detemir   - follows with endocrinologist, planning to transition to insulin pump after delivery  - no hypoglycemic episodes         Status post repeat low  transverse  section    1. Postpartum care:  - Patient doing well. Continue routine management and advances.  - Continue PO pain meds. Pain well controlled.  - Heme: H/h 10/33 > 10/31  - Encourage ambulation  - Circumcision desired. Consents signed, order placed.  - Contraception s/p BTL  - Lactation PRN Lactation consult            Disposition: As patient meets milestones, will plan to discharge tomorrow.    Ching Alvarado MD  Obstetrics  [facility]

## 2018-03-23 NOTE — ASSESSMENT & PLAN NOTE
- Aspart 14/14/8 w/ meals  - Detemir 11/24  - follows with endocrinologist, planning to transition to insulin pump after delivery  - no hypoglycemic episodes

## 2018-03-24 VITALS
BODY MASS INDEX: 34.99 KG/M2 | SYSTOLIC BLOOD PRESSURE: 119 MMHG | TEMPERATURE: 99 F | HEIGHT: 65 IN | RESPIRATION RATE: 18 BRPM | WEIGHT: 210 LBS | HEART RATE: 79 BPM | DIASTOLIC BLOOD PRESSURE: 78 MMHG | OXYGEN SATURATION: 98 %

## 2018-03-24 LAB — POCT GLUCOSE: 77 MG/DL (ref 70–110)

## 2018-03-24 PROCEDURE — 25000003 PHARM REV CODE 250: Performed by: STUDENT IN AN ORGANIZED HEALTH CARE EDUCATION/TRAINING PROGRAM

## 2018-03-24 PROCEDURE — 25000003 PHARM REV CODE 250: Performed by: OBSTETRICS & GYNECOLOGY

## 2018-03-24 PROCEDURE — 63600175 PHARM REV CODE 636 W HCPCS: Performed by: STUDENT IN AN ORGANIZED HEALTH CARE EDUCATION/TRAINING PROGRAM

## 2018-03-24 RX ADMIN — IBUPROFEN 600 MG: 600 TABLET, FILM COATED ORAL at 12:03

## 2018-03-24 RX ADMIN — DOCUSATE SODIUM 200 MG: 100 CAPSULE, LIQUID FILLED ORAL at 07:03

## 2018-03-24 RX ADMIN — INSULIN DETEMIR 11 UNITS: 100 INJECTION, SOLUTION SUBCUTANEOUS at 09:03

## 2018-03-24 RX ADMIN — INSULIN ASPART 14 UNITS: 100 INJECTION, SOLUTION INTRAVENOUS; SUBCUTANEOUS at 09:03

## 2018-03-24 RX ADMIN — HYDROCODONE BITARTRATE AND ACETAMINOPHEN 1 TABLET: 10; 325 TABLET ORAL at 07:03

## 2018-03-24 RX ADMIN — IBUPROFEN 600 MG: 600 TABLET, FILM COATED ORAL at 06:03

## 2018-03-24 NOTE — PROGRESS NOTES
[facility]  Obstetrics  Postpartum Progress Note    Patient Name: Jimena Hazel  MRN: [id number]  Admission Date: 3/21/2018  Hospital Length of Stay: 3 days  Attending Physician: Nidhi Miller MD  Primary Care Provider: Levi Doyle MD    Subjective:     Principal Problem:Type 1 diabetes mellitus complicating pregnancy in second trimester, antepartum    Hospital course: 3/21/18: Patient presented for RTLCS/BTL. Proceeded with no complications.   3/22/18: POD #1: Patient is doing well, meeting postoperative goals. No acute events. Pain controlled. Started on half of pregnancy insulin regiment  03/23/2018 POD #2: Pt continues to do well. No complaints.   3/24/18 POD#3. Continues to meet PP milestones. Stable for discharge. Has meeting with endocrinologist to set up insulin pump.     Interval History: POD#3    She is doing well this morning. She is tolerating a regular diet without nausea or vomiting. She is voiding spontaneously. She is ambulating. She has passed flatus, and has not a BM. Vaginal bleeding is mild. She denies fever or chills. Abdominal pain is moderate and controlled with oral medications. She is not breastfeeding. She desires circumcision for her male baby: done.    Objective:     Vital Signs (Most Recent):  Temp: 97.7 °F (36.5 °C) (03/24/18 0431)  Pulse: 83 (03/24/18 0431)  Resp: 18 (03/24/18 0003)  BP: 109/76 (03/24/18 0431)  SpO2: 97 % (03/24/18 0431) Vital Signs (24h Range):  Temp:  [97.6 °F (36.4 °C)-98.1 °F (36.7 °C)] 97.7 °F (36.5 °C)  Pulse:  [83-97] 83  Resp:  [18] 18  SpO2:  [96 %-99 %] 97 %  BP: (109-122)/(65-76) 109/76     Weight: 95.3 kg (210 lb)  Body mass index is 34.95 kg/m².    No intake or output data in the 24 hours ending 03/24/18 0607    Significant Labs:  Lab Results   Component Value Date    GROUPTRH A POS 03/21/2018    HEPBSAG Negative 09/18/2017    STREPBCULT  03/15/2018     STREPTOCOCCUS AGALACTIAE (GROUP B)  Beta-hemolytic streptococci are  routinely susceptible to   penicillins,cephalosporins and carbapenems.         Recent Labs  Lab 18  0955   HGB 9.2*   HCT 27.5*       Recent Lab Results       18  2232 18  1858 18  1417 18  0810      POCT Glucose 90 83 53(L) 92           Physical Exam:   Constitutional: She is oriented to person, place, and time. She appears well-developed and well-nourished. No distress.    HENT:   Head: Atraumatic.    Eyes: EOM are normal. Pupils are equal, round, and reactive to light.    Neck: Normal range of motion. Neck supple.    Cardiovascular: Normal rate and regular rhythm.     Pulmonary/Chest: Effort normal. No respiratory distress. She has no wheezes.        Abdominal: Soft. She exhibits abdominal incision (c/d/i). She exhibits no distension. There is no tenderness.   Fundus firm at umbilicus              Musculoskeletal: Normal range of motion. She exhibits no edema or tenderness.       Neurological: She is alert and oriented to person, place, and time. She has normal reflexes.    Skin: Skin is warm and dry. She is not diaphoretic.        Assessment/Plan:     37 y.o. female  for:    * Type 1 diabetes mellitus complicating pregnancy in second trimester, antepartum    - Aspart / w/ meals  - Detemir   - follows with endocrinologist, planning to transition to insulin pump after delivery  - no hypoglycemic episodes         Status post repeat low transverse  section    1. Postpartum care:  - Patient doing well. Continue routine management and advances.  - Continue PO pain meds. Pain well controlled.  - Heme: H/h 10/33 > 10/31  - Encourage ambulation  - Circumcision completed.  - Contraception s/p BTL  - Lactation PRN Lactation consult            Disposition: As patient meets milestones, will plan to discharge POD #3.    Everton Leigh MD  Obstetrics  [facility]

## 2018-03-24 NOTE — DISCHARGE SUMMARY
[facility]  Obstetrics  Discharge Summary      Patient Name: Jimena Hazel  MRN: [id number]  Admission Date: 3/21/2018  Hospital Length of Stay: 3 days  Discharge Date and Time:  2018 6:09 AM  Attending Physician: Nidhi Miller MD   Discharging Provider: Everton Leigh MD  Primary Care Provider: Levi Doyle MD    HPI: Jimena Hazel is a 37 y.o. [id number] at 37w0d presents as scheduled for repeat  section with BTL. Patient feels well today, no complaints. Denies VB, LOF, contractions. Good FM.      This IUP is complicated by type 1 DM, on basal/bolus insulin, as well as  x2.      Procedure(s) (LRB):  DELIVERY- SECTION WITH TUBAL (N/A)     Hospital Course:   3/21/18: Patient presented for RTLCS/BTL. Proceeded with no complications.   3/22/18: POD #1: Patient is doing well, meeting postoperative goals. No acute events. Pain controlled. Started on half of pregnancy insulin regiment  2018 POD #2: Pt continues to do well. No complaints.   3/24/18 POD#3. Continues to meet PP milestones. Stable for discharge. Has meeting with endocrinologist to set up insulin pump.     Consults         Status Ordering Provider     Consult to Lactation  Use PRN     Provider:  (Not yet assigned)    Acknowledged BHARGAV RUANO          Final Active Diagnoses:    Diagnosis Date Noted POA    PRINCIPAL PROBLEM:  Type 1 diabetes mellitus complicating pregnancy in second trimester, antepartum [O24.012]  Yes    Status post repeat low transverse  section [Z98.891] 2018 Not Applicable    S/P tubal ligation [Z98.51] 2018 Not Applicable    History of  section complicating pregnancy [O34.219] 2018 Yes      Problems Resolved During this Admission:    Diagnosis Date Noted Date Resolved POA    History of  section complicating pregnancy [O34.219] 2018 Yes    Unwanted fertility [Z30.09] 2018 Unknown        Labs:    CBC   Recent Labs  Lab 18  0955   WBC 7.03   HGB 9.2*   HCT 27.5*   *    and All labs within the past 24 hours have been reviewed    Feeding Method: both breast and bottle    Immunizations     Date Immunization Status Dose Route/Site Given by    18 1642 MMR Incomplete 0.5 mL Subcutaneous/Left deltoid     18 164 Tdap Incomplete 0.5 mL Intramuscular/Left deltoid           Delivery:    Episiotomy: None   Lacerations: None   Repair suture:     Repair # of packets:     Blood loss (ml): 0     Birth information:  YOB: 2016   Time of birth: 12:42 PM   Sex: male   Delivery type: , Low Transverse   Gestational Age: 37w0d    Delivery Clinician:      Other providers:       Additional  information:  Forceps:    Vacuum:    Breech:    Observed anomalies      Living?:           APGARS  One minute Five minutes Ten minutes   Skin color:         Heart rate:         Grimace:         Muscle tone:         Breathing:         Totals: 8  9        Placenta: Delivered:       appearance    Pending Diagnostic Studies:     None          Discharged Condition: good    Disposition: Home or Self Care    Follow Up:  Follow-up Information     Nidhi Miller MD. Schedule an appointment as soon as possible for a visit in 6 weeks.    Specialties:  Obstetrics, Obstetrics and Gynecology  Why:  post partum visit  Contact information:  [address] [place] [address] [id number] [phone]                 Patient Instructions:     Activity as tolerated     Other restrictions (specify):   Order Comments: Nothing in vagina till seen by MD     Notify your health care provider if you experience any of the following:  temperature >100.4     Notify your health care provider if you experience any of the following:  persistent nausea and vomiting or diarrhea     Notify your health care provider if you experience any of the following:  severe uncontrolled pain     Notify your health care provider if you  experience any of the following:  redness, tenderness, or signs of infection (pain, swelling, redness, odor or green/yellow discharge around incision site)     Notify your health care provider if you experience any of the following:  difficulty breathing or increased cough     Notify your health care provider if you experience any of the following:  severe persistent headache     Notify your health care provider if you experience any of the following:  persistent dizziness, light-headedness, or visual disturbances     Notify your health care provider if you experience any of the following:  increased confusion or weakness     Notify your health care provider if you experience any of the following:   Order Comments: Bleeding through 2 pads/hr for 2 hours       Medications:  Current Discharge Medication List      START taking these medications    Details   hydrocodone-acetaminophen 5-325mg (NORCO) 5-325 mg per tablet Take 1 tablet by mouth every 4 (four) hours as needed.  Qty: 20 tablet, Refills: 0      ibuprofen (ADVIL,MOTRIN) 600 MG tablet Take 1 tablet (600 mg total) by mouth every 6 (six) hours.  Qty: 30 tablet, Refills: 3         CONTINUE these medications which have CHANGED    Details   insulin aspart U-100 (NOVOLOG) 100 unit/mL InPn pen Take 14 U with Breakfast; Take 14 U with Lunch, Take 16U with Dinner every day  Qty: 19.8 mL, Refills: 3      insulin detemir U-100 (LEVEMIR FLEXTOUCH) 100 unit/mL (3 mL) SubQ InPn pen Take 11 U each morning at breakfast. 24 at night  Qty: 1 Box, Refills: 3         CONTINUE these medications which have NOT CHANGED    Details   acetaminophen (TYLENOL) 325 MG tablet Take 500 mg by mouth every 6 (six) hours as needed for Pain.      blood sugar diagnostic Strp Check glucose 8x/day  Qty: 300 strip, Refills: 11    Associated Diagnoses: Uncontrolled type 1 diabetes mellitus with hyperglycemia      glucagon (human recombinant) inj 1mg/mL kit Follow package directions for low blood  "sugar.  Qty: 1 kit, Refills: 1    Associated Diagnoses: Uncontrolled type 1 diabetes mellitus without complication      pen needle, diabetic (BD INSULIN PEN NEEDLE UF MINI) 31 gauge x 3/16" Ndle To use 5-6x/day with insulin injections.  Qty: 200 each, Refills: 3    Associated Diagnoses: Uncontrolled type 1 diabetes mellitus with hyperglycemia         STOP taking these medications       aspirin 81 MG Chew Comments:   Reason for Stopping:               Everton Leigh MD  Obstetrics [facility]  "

## 2018-03-24 NOTE — ASSESSMENT & PLAN NOTE
1. Postpartum care:  - Patient doing well. Continue routine management and advances.  - Continue PO pain meds. Pain well controlled.  - Heme: H/h 10/33 > 10/31  - Encourage ambulation  - Circumcision completed.  - Contraception s/p BTL  - Lactation PRN Lactation consult

## 2018-03-24 NOTE — ANESTHESIA POSTPROCEDURE EVALUATION
"Anesthesia Post Evaluation    Patient: Jimena Hazel    Procedure(s) Performed: Procedure(s) (LRB):  DELIVERY- SECTION WITH TUBAL (N/A)    Final Anesthesia Type: spinal  Patient location during evaluation: floor  Patient participation: Yes- Able to Participate  Level of consciousness: awake and alert and oriented  Post-procedure vital signs: reviewed and stable  Pain management: adequate  Airway patency: patent  PONV status at discharge: No PONV  Anesthetic complications: no      Cardiovascular status: blood pressure returned to baseline, hemodynamically stable and stable  Respiratory status: unassisted, room air and spontaneous ventilation  Hydration status: euvolemic  Follow-up not needed.        Visit Vitals  /78   Pulse 79   Temp 37 °C (98.6 °F) (Oral)   Resp 18   Ht 5' 5" (1.651 m)   Wt 95.3 kg (210 lb)   LMP 2017 (Approximate)   SpO2 98%   Breastfeeding? Yes   BMI 34.95 kg/m²       Pain/Shavon Score: Pain Rating Prior to Med Admin: 7 (3/24/2018  7:59 AM)  Pain Rating Post Med Admin: 3 (3/24/2018  7:10 AM)      "

## 2018-03-24 NOTE — PLAN OF CARE
Problem: Patient Care Overview  Goal: Plan of Care Review  Outcome: Outcome(s) achieved Date Met: 03/24/18  Pt ambulating, voiding, and passing flatus. Pt tolerating po well. No s/s of distress at this time. Pt pain well controlled by oral pain medication. Pt bleeding light throughout shift and fundus is firm. Discharge instructions provided. Verbalized understanding. Awaiting transport for discharge.

## 2018-03-24 NOTE — SUBJECTIVE & OBJECTIVE
Hospital course: 3/21/18: Patient presented for RTLCS/BTL. Proceeded with no complications.   3/22/18: POD #1: Patient is doing well, meeting postoperative goals. No acute events. Pain controlled. Started on half of pregnancy insulin regiment  03/23/2018 POD #2: Pt continues to do well. No complaints.   3/24/18 POD#3. Continues to meet PP milestones. Stable for discharge. Has meeting with endocrinologist to set up insulin pump.     Interval History: POD#3    She is doing well this morning. She is tolerating a regular diet without nausea or vomiting. She is voiding spontaneously. She is ambulating. She has passed flatus, and has not a BM. Vaginal bleeding is mild. She denies fever or chills. Abdominal pain is moderate and controlled with oral medications. She is not breastfeeding. She desires circumcision for her male baby: done.    Objective:     Vital Signs (Most Recent):  Temp: 97.7 °F (36.5 °C) (03/24/18 0431)  Pulse: 83 (03/24/18 0431)  Resp: 18 (03/24/18 0003)  BP: 109/76 (03/24/18 0431)  SpO2: 97 % (03/24/18 0431) Vital Signs (24h Range):  Temp:  [97.6 °F (36.4 °C)-98.1 °F (36.7 °C)] 97.7 °F (36.5 °C)  Pulse:  [83-97] 83  Resp:  [18] 18  SpO2:  [96 %-99 %] 97 %  BP: (109-122)/(65-76) 109/76     Weight: 95.3 kg (210 lb)  Body mass index is 34.95 kg/m².    No intake or output data in the 24 hours ending 03/24/18 0607    Significant Labs:  Lab Results   Component Value Date    GROUPTRH A POS 03/21/2018    HEPBSAG Negative 09/18/2017    STREPBCULT  03/15/2018     STREPTOCOCCUS AGALACTIAE (GROUP B)  Beta-hemolytic streptococci are routinely susceptible to   penicillins,cephalosporins and carbapenems.         Recent Labs  Lab 03/22/18  0955   HGB 9.2*   HCT 27.5*       Recent Lab Results       03/23/18  2232 03/23/18  1858 03/23/18  1417 03/23/18  0810      POCT Glucose 90 83 53(L) 92           Physical Exam:   Constitutional: She is oriented to person, place, and time. She appears well-developed and well-nourished.  No distress.    HENT:   Head: Atraumatic.    Eyes: EOM are normal. Pupils are equal, round, and reactive to light.    Neck: Normal range of motion. Neck supple.    Cardiovascular: Normal rate and regular rhythm.     Pulmonary/Chest: Effort normal. No respiratory distress. She has no wheezes.        Abdominal: Soft. She exhibits abdominal incision (c/d/i). She exhibits no distension. There is no tenderness.   Fundus firm at umbilicus              Musculoskeletal: Normal range of motion. She exhibits no edema or tenderness.       Neurological: She is alert and oriented to person, place, and time. She has normal reflexes.    Skin: Skin is warm and dry. She is not diaphoretic.

## 2018-04-26 ENCOUNTER — PATIENT MESSAGE (OUTPATIENT)
Dept: MATERNAL FETAL MEDICINE | Facility: CLINIC | Age: 38
End: 2018-04-26

## 2018-04-26 ENCOUNTER — TELEPHONE (OUTPATIENT)
Dept: MATERNAL FETAL MEDICINE | Facility: CLINIC | Age: 38
End: 2018-04-26

## 2018-04-26 ENCOUNTER — DOCUMENTATION ONLY (OUTPATIENT)
Dept: OBSTETRICS AND GYNECOLOGY | Facility: HOSPITAL | Age: 38
End: 2018-04-26

## 2018-04-26 ENCOUNTER — DOCUMENTATION ONLY (OUTPATIENT)
Dept: MATERNAL FETAL MEDICINE | Facility: CLINIC | Age: 38
End: 2018-04-26

## 2018-04-26 RX ORDER — INSULIN DETEMIR 100 [IU]/ML
INJECTION, SOLUTION SUBCUTANEOUS
Qty: 15 ML | Refills: 0 | Status: SHIPPED | OUTPATIENT
Start: 2018-04-26 | End: 2018-05-07

## 2018-04-26 NOTE — PROGRESS NOTES
[facility]  at 0200 requesting refill on Levemir. Patient stated when discharged refill of short acting provided but not Levemir. She has not seen her endocrinologist yet. She is over one month postpartum. I discussed with the patient that we do not manage nor refill insulin typically this far postpartum and she needs to establish with her endocrinologist. She took her short acting insulin yesterday and Levemir in am. I discussed with her that I would give her one pen of the Levemir by contacting her pharmacy but we cannot be managing her glucoses or refills when not pregnant. She agreed and hung up prior to reviewing her glucose numbers with me. She has been following the same insulin regimen in her discharge summary.  I contacted her [facility] pharmacy and they had her Levemir prescription but needed the Ok to give her one refill. I authorized via phone one pen of Levemir only. I contacted the patient back and she will  this medicine. She reports that her glucoses were high today with a 598 value. She is mentating and acting appropriately. Her  was also on the phone earlier. I advised her to go to [facility] ED immediately for evaluation. I discussed with her that this is an endocrine/medicine matter and not maternal fetal medicine at this point and she needs to be seen ASAP for a DKA evaluation as this glucose can lead to significant problems. I discussed with her that just taking her evening Levemir dosing would not correct the glucose. She expressed her understanding and indicated that she would go to the emergency department as directed.     I contacted the [facility] ER so that they are aware of the patient and have messaged my staff to confirm in am that patient did as directed.

## 2018-04-26 NOTE — TELEPHONE ENCOUNTER
Nurse attempted to contact patient to clarify if she was able to go to the Emergency Room over night.    Attempted to phone number on file in tidy and unable to leave voicemail.    Will send a [employer] message to patient to inquire.    ----- Message from Tara Castillo MD sent at 4/26/2018  2:37 AM CDT -----  Patient called in middle of night for insulin refill. She is postpartum. I indicated we do not typically refill. I talked to her pharmacy and refilled levemir once. When I called her back she told me her blood sugar was 598 and I directed her to go to the emergency room at [facility]. Please contact the patient and be sure that she went in for evaluation.  Tara

## 2018-04-30 ENCOUNTER — TELEPHONE (OUTPATIENT)
Dept: OBSTETRICS AND GYNECOLOGY | Facility: CLINIC | Age: 38
End: 2018-04-30

## 2018-04-30 NOTE — TELEPHONE ENCOUNTER
Please make sure the patient has a PP visit scheduled to discuss her DM management ASAPMarimar Miller MD

## 2018-09-24 PROBLEM — Z3A.14 14 WEEKS GESTATION OF PREGNANCY: Status: RESOLVED | Noted: 2017-10-09 | Resolved: 2018-09-24

## 2018-10-09 ENCOUNTER — TELEPHONE (OUTPATIENT)
Dept: OBSTETRICS AND GYNECOLOGY | Facility: CLINIC | Age: 38
End: 2018-10-09

## 2018-10-09 DIAGNOSIS — Z32.00 POSSIBLE PREGNANCY: Primary | ICD-10-CM

## 2018-10-09 DIAGNOSIS — N91.2 AMENORRHEA: ICD-10-CM

## 2018-10-09 NOTE — TELEPHONE ENCOUNTER
Contacted pt in regards of possible preg. Patient stated that she is around 9 days late and is having some concerns due to having a tubal. Patient was informed that we will draw a beta to check her levels and we will let her know the results. Pt is scheduled for tomorrow

## 2018-10-09 NOTE — TELEPHONE ENCOUNTER
----- Message from Frances Montalvo sent at 10/9/2018  4:04 PM CDT -----  Contact: patient  Patient called to schedule an appointment specifically with Dr Miller this week  And wishes to speak with a nurse regarding this matter.      she can be reached at [phone]    Thanks  KB

## 2018-10-09 NOTE — TELEPHONE ENCOUNTER
----- Message from Carmen Samaniego sent at 10/9/2018  4:44 PM CDT -----  Contact: Pt   Name of Who is Calling: NIKOLE HENNING [[id number]]    What is the request in detail: Pt is returning Michelle's call, please advise.    Can the clinic reply by [facility]: No     [facility]: [phone]

## 2018-10-10 ENCOUNTER — LAB VISIT (OUTPATIENT)
Dept: LAB | Facility: HOSPITAL | Age: 38
End: 2018-10-10
Attending: OBSTETRICS & GYNECOLOGY
Payer: COMMERCIAL

## 2018-10-10 DIAGNOSIS — N91.2 AMENORRHEA: ICD-10-CM

## 2018-10-10 PROCEDURE — 36415 COLL VENOUS BLD VENIPUNCTURE: CPT | Mod: PO

## 2018-10-10 PROCEDURE — 84702 CHORIONIC GONADOTROPIN TEST: CPT

## 2018-10-11 ENCOUNTER — TELEPHONE (OUTPATIENT)
Dept: OBSTETRICS AND GYNECOLOGY | Facility: CLINIC | Age: 38
End: 2018-10-11

## 2018-10-11 LAB — HCG INTACT+B SERPL-ACNC: <1.2 MIU/ML

## 2018-10-11 NOTE — TELEPHONE ENCOUNTER
----- Message from Roshan Manzo sent at 10/11/2018  4:19 PM CDT -----  Pt calling for result of her labs [phone]

## 2018-11-02 DIAGNOSIS — E10.65 UNCONTROLLED TYPE 1 DIABETES MELLITUS WITH HYPERGLYCEMIA: ICD-10-CM

## 2018-11-06 RX ORDER — CALCIUM CITRATE/VITAMIN D3 200MG-6.25
TABLET ORAL
Qty: 300 STRIP | Refills: 0 | Status: SHIPPED | OUTPATIENT
Start: 2018-11-06

## 2018-11-06 RX ORDER — PEN NEEDLE, DIABETIC 30 GX3/16"
NEEDLE, DISPOSABLE MISCELLANEOUS
Qty: 200 EACH | Refills: 0 | Status: SHIPPED | OUTPATIENT
Start: 2018-11-06

## 2018-11-17 ENCOUNTER — HOSPITAL ENCOUNTER (EMERGENCY)
Facility: HOSPITAL | Age: 38
Discharge: HOME | End: 2018-11-18
Attending: EMERGENCY MEDICINE
Payer: COMMERCIAL

## 2018-11-17 DIAGNOSIS — L02.31 ABSCESS OF BUTTOCK, RIGHT: Primary | ICD-10-CM

## 2018-11-17 LAB
ALBUMIN SERPL BCP-MCNC: 3.6 G/DL
ALP SERPL-CCNC: 99 U/L
ALT SERPL W/O P-5'-P-CCNC: 13 U/L
ANION GAP SERPL CALC-SCNC: 13 MMOL/L
AST SERPL-CCNC: 15 U/L
BASOPHILS # BLD AUTO: 0.02 K/UL
BASOPHILS NFR BLD: 0.4 %
BILIRUB SERPL-MCNC: 0.2 MG/DL
BUN SERPL-MCNC: 8 MG/DL
CALCIUM SERPL-MCNC: 10 MG/DL
CHLORIDE SERPL-SCNC: 103 MMOL/L
CO2 SERPL-SCNC: 22 MMOL/L
CREAT SERPL-MCNC: 1 MG/DL
DIFFERENTIAL METHOD: NORMAL
EOSINOPHIL # BLD AUTO: 0.1 K/UL
EOSINOPHIL NFR BLD: 1.3 %
ERYTHROCYTE [DISTWIDTH] IN BLOOD BY AUTOMATED COUNT: 12.6 %
EST. GFR  (AFRICAN AMERICAN): >60 ML/MIN/1.73 M^2
EST. GFR  (NON AFRICAN AMERICAN): >60 ML/MIN/1.73 M^2
GLUCOSE SERPL-MCNC: 385 MG/DL
HCT VFR BLD AUTO: 37.6 %
HGB BLD-MCNC: 12.7 G/DL
IMM GRANULOCYTES # BLD AUTO: 0.01 K/UL
IMM GRANULOCYTES NFR BLD AUTO: 0.2 %
LYMPHOCYTES # BLD AUTO: 2 K/UL
LYMPHOCYTES NFR BLD: 37.7 %
MCH RBC QN AUTO: 29.6 PG
MCHC RBC AUTO-ENTMCNC: 33.8 G/DL
MCV RBC AUTO: 88 FL
MONOCYTES # BLD AUTO: 0.3 K/UL
MONOCYTES NFR BLD: 5.9 %
NEUTROPHILS # BLD AUTO: 2.9 K/UL
NEUTROPHILS NFR BLD: 54.5 %
NRBC BLD-RTO: 0 /100 WBC
PLATELET # BLD AUTO: 242 K/UL
PMV BLD AUTO: 10.1 FL
POTASSIUM SERPL-SCNC: 3.6 MMOL/L
PROT SERPL-MCNC: 8.1 G/DL
RBC # BLD AUTO: 4.29 M/UL
SODIUM SERPL-SCNC: 138 MMOL/L
WBC # BLD AUTO: 5.28 K/UL

## 2018-11-17 PROCEDURE — 96361 HYDRATE IV INFUSION ADD-ON: CPT

## 2018-11-17 PROCEDURE — 96374 THER/PROPH/DIAG INJ IV PUSH: CPT

## 2018-11-17 PROCEDURE — 99284 EMERGENCY DEPT VISIT MOD MDM: CPT | Mod: ,,, | Performed by: EMERGENCY MEDICINE

## 2018-11-17 PROCEDURE — 85025 COMPLETE CBC W/AUTO DIFF WBC: CPT

## 2018-11-17 PROCEDURE — 80053 COMPREHEN METABOLIC PANEL: CPT

## 2018-11-17 PROCEDURE — 99284 EMERGENCY DEPT VISIT MOD MDM: CPT | Mod: 25

## 2018-11-17 PROCEDURE — 63600175 PHARM REV CODE 636 W HCPCS: Performed by: PHYSICIAN ASSISTANT

## 2018-11-17 PROCEDURE — 25000003 PHARM REV CODE 250: Performed by: PHYSICIAN ASSISTANT

## 2018-11-17 RX ORDER — CEFTRIAXONE 1 G/1
1 INJECTION, POWDER, FOR SOLUTION INTRAMUSCULAR; INTRAVENOUS
Status: COMPLETED | OUTPATIENT
Start: 2018-11-17 | End: 2018-11-17

## 2018-11-17 RX ADMIN — CEFTRIAXONE SODIUM 1 G: 1 INJECTION, POWDER, FOR SOLUTION INTRAMUSCULAR; INTRAVENOUS at 10:11

## 2018-11-17 RX ADMIN — SODIUM CHLORIDE 1000 ML: 0.9 INJECTION, SOLUTION INTRAVENOUS at 10:11

## 2018-11-18 VITALS
BODY MASS INDEX: 36.49 KG/M2 | HEIGHT: 65 IN | SYSTOLIC BLOOD PRESSURE: 118 MMHG | DIASTOLIC BLOOD PRESSURE: 70 MMHG | WEIGHT: 219 LBS | TEMPERATURE: 99 F | OXYGEN SATURATION: 99 % | HEART RATE: 80 BPM | RESPIRATION RATE: 18 BRPM

## 2018-11-18 RX ORDER — CLINDAMYCIN HYDROCHLORIDE 300 MG/1
300 CAPSULE ORAL EVERY 8 HOURS
Qty: 30 CAPSULE | Refills: 0 | Status: SHIPPED | OUTPATIENT
Start: 2018-11-18 | End: 2018-11-28

## 2018-11-18 RX ORDER — TRAMADOL HYDROCHLORIDE 50 MG/1
50 TABLET ORAL EVERY 6 HOURS PRN
Qty: 12 TABLET | Refills: 0 | Status: SHIPPED | OUTPATIENT
Start: 2018-11-18 | End: 2018-11-21

## 2018-11-18 NOTE — ED NOTES
LOC: Patient name and date of birth verified.  The patient is awake, alert and aware of environment with an appropriate affect, the patient is oriented x 3 and speaking appropriately.  Pt in NAD.    APPEARANCE: Patient resting comfortably and in no acute distress, patient is clean and well groomed, patient's clothing is properly fastened.  SKIN: The skin is warm and dry, color consistent with ethnicity, patient has normal skin turgor and moist mucus membranes, right posterior buttocks abscess with purulent drainage.  MUSCULOSKELETAL: Patient moving all extremities well, no obvious swelling or deformities noted.  RESPIRATORY: Airway is open and patent, respirations are spontaneous, patient has a normal effort and rate, no accessory muscle use noted.  CARDIAC: Patient has a normal rate and rhythm, no periphreal edema noted, capillary refill < 3 seconds.  ABDOMEN: Soft and non tender to palpation, no distention noted. Bowel sounds present in all four quadrants.  NEUROLOGIC: Eyes open spontaneously, behavior appropriate to situation, follows commands, facial expression symmetrical, bilateral hand grasp equal and even, purposeful motor response noted, normal sensation in all extremities when touched with a finger.

## 2018-11-18 NOTE — ED PROVIDER NOTES
Encounter Date: 2018       History     Chief Complaint   Patient presents with    Abscess     To right buttock     37-year-old female with no significant past medical history presents to the emergency department with a 4 day onset of right-sided buttock pain. She states that the discomfort started out mildly and progressed to a more severe pain and discomfort involving the right buttock.  She reports associated fever and chills. She describes the pain as throbbing and rated as a 7/10 severity.  She states that warm soaks in the tub has alleviated her pain and she says her pain is worsened by prolonged periods of sitting.          Review of patient's allergies indicates:  No Known Allergies  Past Medical History:   Diagnosis Date    Diabetes mellitus     Diabetes mellitus type I     Irregular heartbeat      Past Surgical History:   Procedure Laterality Date     SECTION, LOW TRANSVERSE  2011    DELIVERY- SECTION WITH TUBAL N/A 3/21/2018    Performed by Nidhi Miller MD at [facility] L[facility]     Family History   Problem Relation Age of Onset    Diabetes Mother     Hypertension Mother     Diabetes Father     No Known Problems Sister     No Known Problems Brother     Congenital heart disease Neg Hx     Pacemaker/defibrilator Neg Hx     Early death Neg Hx      Social History     Tobacco Use    Smoking status: Never Smoker    Smokeless tobacco: Never Used   Substance Use Topics    Alcohol use: No    Drug use: No     Review of Systems   Constitutional: Negative for fever.   HENT: Negative for facial swelling and sore throat.    Respiratory: Negative for cough, choking, shortness of breath and wheezing.    Cardiovascular: Negative for chest pain.   Gastrointestinal: Negative for nausea.   Genitourinary: Negative for dysuria.   Musculoskeletal: Negative for back pain.   Skin: Negative for rash.   Neurological: Negative for dizziness, weakness and light-headedness.   Hematological: Does not  bruise/bleed easily.   Psychiatric/Behavioral: Negative for agitation and behavioral problems.       Physical Exam     Initial Vitals [11/17/18 2027]   BP Pulse Resp Temp SpO2   122/77 94 16 97.7 °F (36.5 °C) 99 %      MAP       --         Physical Exam    Nursing note and vitals reviewed.  Constitutional: She appears well-developed and well-nourished.   HENT:   Head: Normocephalic and atraumatic.   Eyes: Conjunctivae and EOM are normal. Pupils are equal, round, and reactive to light.   Neck: Normal range of motion. Neck supple.   Cardiovascular: Normal rate, regular rhythm, normal heart sounds and intact distal pulses.   Pulmonary/Chest: Breath sounds normal.   Abdominal: Soft. Bowel sounds are normal.   Musculoskeletal: Normal range of motion.   Neurological: She is alert and oriented to person, place, and time. She has normal strength and normal reflexes. GCS score is 15. GCS eye subscore is 4. GCS verbal subscore is 5. GCS motor subscore is 6.   Skin: Lesion and abscess noted. No abrasion, no burn, no ecchymosis, no laceration, no petechiae, no purpura and no rash noted. Rash is not macular, not papular, not maculopapular, not nodular, not pustular, not vesicular and not urticarial. There is erythema.        + 2 cm abscess with significant purulent drainage noted and surrounding cellulitis.   Psychiatric: She has a normal mood and affect. Thought content normal.         ED Course   Procedures  Labs Reviewed   COMPREHENSIVE METABOLIC PANEL - Abnormal; Notable for the following components:       Result Value    CO2 22 (*)     Glucose 385 (*)     All other components within normal limits   CBC W/ AUTO DIFFERENTIAL          Imaging Results    None          Medical Decision Making:   Initial Assessment:   Patient was seen examined at the bedside sitting comfortably on the exam table in no acute distress well appearing and cooperative with exam.  Differential Diagnosis:   Abscess; cellulitis  Clinical Tests:   Lab  Tests: Ordered and Reviewed  The following lab test(s) were unremarkable: CBC and CMP  ED Management:  Patient was started on intravenous fluids and IV antibiotics in the emergency department lab tests were ordered and reviewed.  Other:   I have discussed this case with another health care provider.       <> Summary of the Discussion: Discussed the case with Dr. Devlin       APC / Resident Notes:   37-year-old female with no significant past medical history presents to the emergency department with a 4 day onset of right-sided buttock pain. She states that the discomfort started out mildly and progressed to a more severe pain and discomfort involving the right buttock.  She reports associated fever and chills. She describes the pain as throbbing and rated as a 7/10 severity.  She states that warm soaks in the tub has alleviated her pain and she says her pain is worsened by prolonged periods of sitting.                   Clinical Impression:   The encounter diagnosis was Abscess of buttock, right.      Disposition:   Disposition: Discharged  Condition: Stable  Patient was instructed to follow up with the primary care provider for continued monitoring and wound checks.                        Zbigniew Taylor PA-C  11/18/18 0010

## 2018-11-18 NOTE — ED TRIAGE NOTES
Pt presents to the ED c/o of right buttocks abscess. Pt states that this past Wednesday she noticed a slight raised pump thinking it was an ingrown hair follicle. Pt states that yesterday she noticed purulent drainage from the abscess. Pt states she has been applying tissue to abscess. Denies any past occurrences of abscesses to affected area. Pt states she feeling discomfort and pain but has not taken anything for pain.

## 2019-02-11 PROBLEM — Z3A.34 34 WEEKS GESTATION OF PREGNANCY: Status: RESOLVED | Noted: 2018-03-05 | Resolved: 2019-02-11

## 2019-05-29 LAB
ALBUMIN/GLOBULIN RATIO: 1.3
ALBUMIN: 4.4
ALP ISOS SERPL LEV INH-CCNC: 87 U/L
ALT: 14
AST: 20
BILIRUBIN, TOTAL: 0.5
CALCIUM SERPL-MCNC: 9.6 MG/DL
CARBON DIOXIDE, CO2: 26
CHLORIDE: 100
CREAT SERPL-MCNC: 0.7 MG/DL
EGFR IF AFRICAN AMERICAN: 130
EST. GFR  (NON AFRICAN AMERICAN): 113 MG/DL
GLOBULIN: 3.3
GLUCOSE BLOOD: 61
HBA1C MFR BLD: 10.3 %
MICROALBUMIN URINE: 14.2
POTASSIUM: 3.8
PROTEIN TOTAL: 7.7
SODIUM: 138
UREA NITROGEN (BUN): 9

## 2019-11-12 ENCOUNTER — PATIENT MESSAGE (OUTPATIENT)
Dept: ADMINISTRATIVE | Facility: HOSPITAL | Age: 39
End: 2019-11-12

## 2019-11-12 ENCOUNTER — PATIENT OUTREACH (OUTPATIENT)
Dept: ADMINISTRATIVE | Facility: HOSPITAL | Age: 39
End: 2019-11-12

## 2019-11-26 ENCOUNTER — OFFICE VISIT (OUTPATIENT)
Dept: FAMILY MEDICINE | Facility: CLINIC | Age: 39
End: 2019-11-26
Payer: COMMERCIAL

## 2019-11-26 VITALS
OXYGEN SATURATION: 99 % | TEMPERATURE: 97 F | WEIGHT: 167.56 LBS | HEART RATE: 68 BPM | SYSTOLIC BLOOD PRESSURE: 118 MMHG | RESPIRATION RATE: 17 BRPM | BODY MASS INDEX: 26.93 KG/M2 | HEIGHT: 66 IN | DIASTOLIC BLOOD PRESSURE: 88 MMHG

## 2019-11-26 DIAGNOSIS — Z00.00 ANNUAL PHYSICAL EXAM: Primary | ICD-10-CM

## 2019-11-26 DIAGNOSIS — E10.9 TYPE 1 DIABETES MELLITUS WITHOUT COMPLICATION: ICD-10-CM

## 2019-11-26 PROCEDURE — 90471 FLU VACCINE (QUAD) GREATER THAN OR EQUAL TO 3YO PRESERVATIVE FREE IM: ICD-10-PCS | Mod: S$GLB,,, | Performed by: FAMILY MEDICINE

## 2019-11-26 PROCEDURE — 90686 IIV4 VACC NO PRSV 0.5 ML IM: CPT | Mod: S$GLB,,, | Performed by: FAMILY MEDICINE

## 2019-11-26 PROCEDURE — 99999 PR PBB SHADOW E&M-EST. PATIENT-LVL III: ICD-10-PCS | Mod: PBBFAC,,, | Performed by: FAMILY MEDICINE

## 2019-11-26 PROCEDURE — 90472 IMMUNIZATION ADMIN EACH ADD: CPT | Mod: S$GLB,,, | Performed by: FAMILY MEDICINE

## 2019-11-26 PROCEDURE — 99385 PREV VISIT NEW AGE 18-39: CPT | Mod: 25,S$GLB,, | Performed by: FAMILY MEDICINE

## 2019-11-26 PROCEDURE — 90732 PPSV23 VACC 2 YRS+ SUBQ/IM: CPT | Mod: S$GLB,,, | Performed by: FAMILY MEDICINE

## 2019-11-26 PROCEDURE — 90686 FLU VACCINE (QUAD) GREATER THAN OR EQUAL TO 3YO PRESERVATIVE FREE IM: ICD-10-PCS | Mod: S$GLB,,, | Performed by: FAMILY MEDICINE

## 2019-11-26 PROCEDURE — 99999 PR PBB SHADOW E&M-EST. PATIENT-LVL III: CPT | Mod: PBBFAC,,, | Performed by: FAMILY MEDICINE

## 2019-11-26 PROCEDURE — 99385 PR PREVENTIVE VISIT,NEW,18-39: ICD-10-PCS | Mod: 25,S$GLB,, | Performed by: FAMILY MEDICINE

## 2019-11-26 PROCEDURE — 90732 PNEUMOCOCCAL POLYSACCHARIDE VACCINE 23-VALENT =>2YO SQ IM: ICD-10-PCS | Mod: S$GLB,,, | Performed by: FAMILY MEDICINE

## 2019-11-26 PROCEDURE — 90472 PNEUMOCOCCAL POLYSACCHARIDE VACCINE 23-VALENT =>2YO SQ IM: ICD-10-PCS | Mod: S$GLB,,, | Performed by: FAMILY MEDICINE

## 2019-11-26 PROCEDURE — 90471 IMMUNIZATION ADMIN: CPT | Mod: S$GLB,,, | Performed by: FAMILY MEDICINE

## 2019-11-26 NOTE — PROGRESS NOTES
Administered Influenza Vaccine 0.5mL IM to right deltoid. No s/s of any adverse reaction noted.   Administered Pneumococcal 23 Vaccine 0.5mL IM to left deltoid. No s/s of any adverse reaction noted.

## 2019-11-26 NOTE — PROGRESS NOTES
Subjective:       Patient ID: Jimena Hazel is a 38 y.o. female.    Chief Complaint: Pain (abdomen- mid upper); Establish Care; and Diabetic Foot Exam    38 year old female presents to establish care. She had a papsmear 1.5 years ago. She had labs with Dr. Noonan 6 months ago. She doesn't recall the numbers. she is on tresiba 40 units at night and novolog with meals.     Past Medical History:   Diagnosis Date    Diabetes mellitus     Diabetes mellitus type I     diagnosed 5 years ago      Past Surgical History:   Procedure Laterality Date     SECTION, LOW TRANSVERSE       Family History   Problem Relation Age of Onset    No Known Problems Sister     No Known Problems Brother     Congenital heart disease Neg Hx     Pacemaker/defibrilator Neg Hx     Early death Neg Hx      Social History     Socioeconomic History    Marital status:      Spouse name: Not on file    Number of children: 3    Years of education: Not on file    Highest education level: Not on file   Occupational History     Comment: teacher   Social Needs    Financial resource strain: Not on file    Food insecurity:     Worry: Not on file     Inability: Not on file    Transportation needs:     Medical: Not on file     Non-medical: Not on file   Tobacco Use    Smoking status: Never Smoker    Smokeless tobacco: Never Used   Substance and Sexual Activity    Alcohol use: No    Drug use: No    Sexual activity: Yes     Partners: Male     Birth control/protection: None   Lifestyle    Physical activity:     Days per week: Not on file     Minutes per session: Not on file    Stress: Not on file   Relationships    Social connections:     Talks on phone: Not on file     Gets together: Not on file     Attends [employer] service: Not on file     Active member of club or organization: Not on file     Attends meetings of clubs or organizations: Not on file     Relationship status: Not on file   Other Topics Concern    Not on  "file   Social History Narrative    . Father's name Ladarius. Baby is a bot named Dmitriy.Mom also has another son, named Will,14 yr.       Review of Systems   Constitutional: Negative for chills, diaphoresis, fatigue, fever and unexpected weight change.   Respiratory: Negative for cough, chest tightness and shortness of breath.    Cardiovascular: Negative for chest pain, palpitations and leg swelling.   Gastrointestinal: Positive for abdominal pain. Negative for blood in stool, constipation, diarrhea, nausea and vomiting.        Stabbing, non radiating, epigastric abdominal pain that resolved after a few weeks.    Genitourinary: Negative for dysuria and hematuria.   Musculoskeletal: Positive for neck stiffness.   Neurological: Negative for dizziness, syncope and light-headedness.       Objective:       Vitals:    11/26/19 1326   BP: 118/88   Pulse: 68   Resp: 17   Temp: 97.4 °F (36.3 °C)   TempSrc: Oral   SpO2: 99%   Weight: 76 kg (167 lb 8.8 oz)   Height: 5' 6" (1.676 m)       Physical Exam   Constitutional: She is oriented to person, place, and time. She appears well-developed and well-nourished. No distress.   HENT:   Head: Normocephalic and atraumatic.   Right Ear: External ear normal.   Left Ear: External ear normal.   Mouth/Throat: Oropharynx is clear and moist. No oropharyngeal exudate.   Eyes: Conjunctivae are normal.   Neck: Normal range of motion. Neck supple. Carotid bruit is not present. No thyromegaly present.   Cardiovascular: Normal rate, regular rhythm and normal heart sounds. Exam reveals no gallop and no friction rub.   No murmur heard.  Pulmonary/Chest: Effort normal and breath sounds normal. No stridor. No respiratory distress. She has no wheezes. She has no rales.   Abdominal: Soft. Bowel sounds are normal. She exhibits no distension and no mass. There is no tenderness. There is no rebound and no guarding.   Musculoskeletal: She exhibits no edema.   Lymphadenopathy:     She has no cervical " adenopathy.   Neurological: She is alert and oriented to person, place, and time.   Skin: No rash noted. She is not diaphoretic.   Psychiatric: She has a normal mood and affect.         Protective Sensation (w/ 10 gram monofilament):  Right: Intact  Left: Intact    Visual Inspection:  Normal -  Bilateral    Pedal Pulses:   Right: Present  Left: Present    Posterior tibialis:   Right:Present  Left: Present      Assessment:       1. Annual physical exam    2. Type 1 diabetes mellitus without complication        Plan:       Jimena was seen today for pain, establish care and diabetic foot exam.    Diagnoses and all orders for this visit:    Annual physical exam  Will get labs from Dr. Noonan. She will establish care with Dr. Stephie Salcido for OB care.   Type 1 diabetes mellitus without complication  -     Ambulatory referral to Optometry    Other orders  -     Influenza - Quadrivalent (6 months+) (PF)  -     (In Office Administered) Pneumococcal Polysaccharide Vaccine (23 Valent) (SQ/IM)

## 2019-12-05 DIAGNOSIS — E11.9 TYPE 2 DIABETES MELLITUS WITHOUT COMPLICATION: ICD-10-CM

## 2019-12-27 ENCOUNTER — TELEPHONE (OUTPATIENT)
Dept: FAMILY MEDICINE | Facility: CLINIC | Age: 39
End: 2019-12-27

## 2020-03-11 ENCOUNTER — TELEPHONE (OUTPATIENT)
Dept: FAMILY MEDICINE | Facility: CLINIC | Age: 40
End: 2020-03-11

## 2020-03-11 NOTE — TELEPHONE ENCOUNTER
If she has no fever. I don't recommend anything else. Cold symptoms last 7-10 days. She is a diabetic so her symptoms can last longer

## 2020-03-11 NOTE — TELEPHONE ENCOUNTER
Pt states she has had cough and sore throat x 5 days. No fever, nausea, chills.  Has not been exposed to anyone with coronavirus nor has she traveled.  Would like to know what to do.

## 2020-03-11 NOTE — TELEPHONE ENCOUNTER
- continue symptomatic treatment with rest, increase fluid intake, tylenol or ibuprofen PRN fever or body aches.

## 2020-03-11 NOTE — TELEPHONE ENCOUNTER
----- Message from Sangeeta Singh sent at 3/11/2020  9:04 AM CDT -----  Contact: Red dot   Type: Patient Call Back     What is the request in detail: Pt states that she think she has the COVID-19. I asked pt the travel questions. Pt states she has a cough sore throat and runny nose.     Can the clinic reply by [facility]? No     Would the patient rather a call back or a response via [facility]? Call back     Best call back number: [phone]

## 2020-03-11 NOTE — TELEPHONE ENCOUNTER
Patient stated she has already been doing what Dr. Perez is suggesting, but it is not working.  Would like to know if there is something else that may be done or if Dr. Perez recommends she goes elsewhere for treatment.  Please advise.

## 2020-08-14 DIAGNOSIS — Z11.59 NEED FOR HEPATITIS C SCREENING TEST: ICD-10-CM

## 2020-09-28 ENCOUNTER — HOSPITAL ENCOUNTER (EMERGENCY)
Facility: HOSPITAL | Age: 40
Discharge: HOME | End: 2020-09-28
Attending: EMERGENCY MEDICINE
Payer: COMMERCIAL

## 2020-09-28 VITALS
OXYGEN SATURATION: 100 % | RESPIRATION RATE: 16 BRPM | DIASTOLIC BLOOD PRESSURE: 91 MMHG | BODY MASS INDEX: 25.49 KG/M2 | SYSTOLIC BLOOD PRESSURE: 132 MMHG | HEART RATE: 94 BPM | WEIGHT: 153 LBS | TEMPERATURE: 98 F | HEIGHT: 65 IN

## 2020-09-28 DIAGNOSIS — E10.10 DIABETIC KETOACIDOSIS WITHOUT COMA ASSOCIATED WITH TYPE 1 DIABETES MELLITUS: Primary | ICD-10-CM

## 2020-09-28 DIAGNOSIS — R73.9 HYPERGLYCEMIA: ICD-10-CM

## 2020-09-28 DIAGNOSIS — R06.02 SHORTNESS OF BREATH: ICD-10-CM

## 2020-09-28 LAB
ALBUMIN SERPL BCP-MCNC: 4.4 G/DL (ref 3.5–5.2)
ALLENS TEST: ABNORMAL
ALP SERPL-CCNC: 98 U/L (ref 55–135)
ALT SERPL W/O P-5'-P-CCNC: 7 U/L (ref 10–44)
ANION GAP SERPL CALC-SCNC: 15 MMOL/L (ref 8–16)
ANION GAP SERPL CALC-SCNC: 17 MMOL/L (ref 8–16)
AST SERPL-CCNC: 18 U/L (ref 10–40)
B-HCG UR QL: NEGATIVE
B-OH-BUTYR BLD STRIP-SCNC: 3.7 MMOL/L (ref 0–0.5)
BASOPHILS # BLD AUTO: 0.03 K/UL (ref 0–0.2)
BASOPHILS NFR BLD: 0.4 % (ref 0–1.9)
BILIRUB SERPL-MCNC: 0.4 MG/DL (ref 0.1–1)
BUN SERPL-MCNC: 12 MG/DL (ref 6–20)
BUN SERPL-MCNC: 9 MG/DL (ref 6–20)
CALCIUM SERPL-MCNC: 8.6 MG/DL (ref 8.7–10.5)
CALCIUM SERPL-MCNC: 9.9 MG/DL (ref 8.7–10.5)
CHLORIDE SERPL-SCNC: 100 MMOL/L (ref 95–110)
CHLORIDE SERPL-SCNC: 104 MMOL/L (ref 95–110)
CO2 SERPL-SCNC: 10 MMOL/L (ref 23–29)
CO2 SERPL-SCNC: 11 MMOL/L (ref 23–29)
CREAT SERPL-MCNC: 0.9 MG/DL (ref 0.5–1.4)
CREAT SERPL-MCNC: 1.1 MG/DL (ref 0.5–1.4)
CTP QC/QA: YES
CTP QC/QA: YES
DIFFERENTIAL METHOD: NORMAL
EOSINOPHIL # BLD AUTO: 0 K/UL (ref 0–0.5)
EOSINOPHIL NFR BLD: 0.5 % (ref 0–8)
ERYTHROCYTE [DISTWIDTH] IN BLOOD BY AUTOMATED COUNT: 13.3 % (ref 11.5–14.5)
EST. GFR  (AFRICAN AMERICAN): >60 ML/MIN/1.73 M^2
EST. GFR  (AFRICAN AMERICAN): >60 ML/MIN/1.73 M^2
EST. GFR  (NON AFRICAN AMERICAN): >60 ML/MIN/1.73 M^2
EST. GFR  (NON AFRICAN AMERICAN): >60 ML/MIN/1.73 M^2
GLUCOSE SERPL-MCNC: 224 MG/DL (ref 70–110)
GLUCOSE SERPL-MCNC: 240 MG/DL (ref 70–110)
HCO3 UR-SCNC: 12.8 MMOL/L (ref 24–28)
HCT VFR BLD AUTO: 38.3 % (ref 37–48.5)
HGB BLD-MCNC: 12.6 G/DL (ref 12–16)
IMM GRANULOCYTES # BLD AUTO: 0.01 K/UL (ref 0–0.04)
IMM GRANULOCYTES NFR BLD AUTO: 0.1 % (ref 0–0.5)
LYMPHOCYTES # BLD AUTO: 2.1 K/UL (ref 1–4.8)
LYMPHOCYTES NFR BLD: 29.3 % (ref 18–48)
MAGNESIUM SERPL-MCNC: 1.9 MG/DL (ref 1.6–2.6)
MCH RBC QN AUTO: 28.6 PG (ref 27–31)
MCHC RBC AUTO-ENTMCNC: 32.9 G/DL (ref 32–36)
MCV RBC AUTO: 87 FL (ref 82–98)
MONOCYTES # BLD AUTO: 0.6 K/UL (ref 0.3–1)
MONOCYTES NFR BLD: 7.8 % (ref 4–15)
NEUTROPHILS # BLD AUTO: 4.5 K/UL (ref 1.8–7.7)
NEUTROPHILS NFR BLD: 61.9 % (ref 38–73)
NRBC BLD-RTO: 0 /100 WBC
PCO2 BLDA: 24.1 MMHG (ref 35–45)
PH SMN: 7.33 [PH] (ref 7.35–7.45)
PLATELET # BLD AUTO: 329 K/UL (ref 150–350)
PMV BLD AUTO: 9.5 FL (ref 9.2–12.9)
PO2 BLDA: 51 MMHG (ref 40–60)
POC BE: -13 MMOL/L
POC SATURATED O2: 84 % (ref 95–100)
POC TCO2: 14 MMOL/L (ref 24–29)
POCT GLUCOSE: 207 MG/DL (ref 70–110)
POCT GLUCOSE: 257 MG/DL (ref 70–110)
POTASSIUM SERPL-SCNC: 3.7 MMOL/L (ref 3.5–5.1)
POTASSIUM SERPL-SCNC: 4 MMOL/L (ref 3.5–5.1)
PROT SERPL-MCNC: 9.8 G/DL (ref 6–8.4)
RBC # BLD AUTO: 4.4 M/UL (ref 4–5.4)
SAMPLE: ABNORMAL
SARS-COV-2 RDRP RESP QL NAA+PROBE: NEGATIVE
SITE: ABNORMAL
SODIUM SERPL-SCNC: 128 MMOL/L (ref 136–145)
SODIUM SERPL-SCNC: 129 MMOL/L (ref 136–145)
WBC # BLD AUTO: 7.28 K/UL (ref 3.9–12.7)

## 2020-09-28 PROCEDURE — 82803 BLOOD GASES ANY COMBINATION: CPT

## 2020-09-28 PROCEDURE — 82962 GLUCOSE BLOOD TEST: CPT

## 2020-09-28 PROCEDURE — 25000003 PHARM REV CODE 250: Performed by: EMERGENCY MEDICINE

## 2020-09-28 PROCEDURE — U0002 COVID-19 LAB TEST NON-CDC: HCPCS | Performed by: EMERGENCY MEDICINE

## 2020-09-28 PROCEDURE — 63600175 PHARM REV CODE 636 W HCPCS: Performed by: EMERGENCY MEDICINE

## 2020-09-28 PROCEDURE — 96361 HYDRATE IV INFUSION ADD-ON: CPT

## 2020-09-28 PROCEDURE — 93005 ELECTROCARDIOGRAM TRACING: CPT

## 2020-09-28 PROCEDURE — 93010 EKG 12-LEAD: ICD-10-PCS | Mod: ,,, | Performed by: INTERNAL MEDICINE

## 2020-09-28 PROCEDURE — 99285 PR EMERGENCY DEPT VISIT,LEVEL V: ICD-10-PCS | Mod: ,,, | Performed by: EMERGENCY MEDICINE

## 2020-09-28 PROCEDURE — 85025 COMPLETE CBC W/AUTO DIFF WBC: CPT

## 2020-09-28 PROCEDURE — 99285 EMERGENCY DEPT VISIT HI MDM: CPT | Mod: ,,, | Performed by: EMERGENCY MEDICINE

## 2020-09-28 PROCEDURE — 99900035 HC TECH TIME PER 15 MIN (STAT)

## 2020-09-28 PROCEDURE — 96360 HYDRATION IV INFUSION INIT: CPT

## 2020-09-28 PROCEDURE — 83735 ASSAY OF MAGNESIUM: CPT

## 2020-09-28 PROCEDURE — 82010 KETONE BODYS QUAN: CPT

## 2020-09-28 PROCEDURE — 93010 ELECTROCARDIOGRAM REPORT: CPT | Mod: ,,, | Performed by: INTERNAL MEDICINE

## 2020-09-28 PROCEDURE — 99285 EMERGENCY DEPT VISIT HI MDM: CPT | Mod: 25

## 2020-09-28 PROCEDURE — 80053 COMPREHEN METABOLIC PANEL: CPT

## 2020-09-28 PROCEDURE — 80048 BASIC METABOLIC PNL TOTAL CA: CPT

## 2020-09-28 PROCEDURE — 82800 BLOOD PH: CPT

## 2020-09-28 PROCEDURE — 81025 URINE PREGNANCY TEST: CPT | Performed by: EMERGENCY MEDICINE

## 2020-09-28 PROCEDURE — 96372 THER/PROPH/DIAG INJ SC/IM: CPT | Mod: 59

## 2020-09-28 RX ORDER — INSULIN ASPART 100 [IU]/ML
2 INJECTION, SOLUTION INTRAVENOUS; SUBCUTANEOUS
Status: COMPLETED | OUTPATIENT
Start: 2020-09-28 | End: 2020-09-28

## 2020-09-28 RX ADMIN — INSULIN ASPART 2 UNITS: 100 INJECTION, SOLUTION INTRAVENOUS; SUBCUTANEOUS at 05:09

## 2020-09-28 RX ADMIN — SODIUM CHLORIDE 1000 ML: 0.9 INJECTION, SOLUTION INTRAVENOUS at 03:09

## 2020-09-28 NOTE — Clinical Note
"Jimena Nevarez" Hazel was seen and treated in our emergency department on 9/28/2020.  She may return to work on 09/29/2020.       If you have any questions or concerns, please don't hesitate to call.      Tiffany HEIN    "

## 2020-09-28 NOTE — PROVIDER PROGRESS NOTES - EMERGENCY DEPT.
I have assumed care for this patient from Dr. Langford          6:44 AM . I have reviewed case, and have seen patient. I have read the chart and discussed care with the previous attending. I agree with the plan as previously determined. Since assuming care, the patient's course includes:    39-year-old with mild DKA BMP pending if improved likely discharge    PH 7.33  Anion gap 17, bicarbonate 11  Beta hydroxy 3.7  Patient already took her home insulin, status post 1 L normal saline in the emergency department      BMP pending    6:54 AM  patient discharged by Dr Langford

## 2020-09-28 NOTE — DISCHARGE INSTRUCTIONS
Follow your diabetic diet.  Use insulin as ordered.    Our goal in the emergency department is to always give you outstanding care and exceptional service. You may receive a survey by mail or e-mail in the next week regarding your experience in our ED. We would greatly appreciate your completing and returning the survey. Your feedback provides us with a way to recognize our staff who give very good care and it helps us learn how to improve when your experience was below our aspiration of excellence.

## 2020-09-28 NOTE — ED PROVIDER NOTES
Encounter Date: 2020       History     Chief Complaint   Patient presents with    Hyperglycemia     pt reports elevated blood sugars all weekend. states checking ketones at home and it was elevated. reports weakness and fatigue. reports been out of medications since friday     39-year-old female with a history of type 1 diabetes and prior episodes of DKA presents with a weekend of high sugars.  Ran out of her insulin on Friday.  Unable to get filled till today.  Sugars at home and the 500s.  Improved today with insulin and fluid.  She was feeling generally ill and had shortness of breath today.  Associated nausea.  She denies vomiting, diarrhea, fever, cough, abdominal pain, or dysuria.  A ten point review of systems was completed and is negative except as documented above.  Patient denies any other acute medical complaint.  The patients available PMH, PSH, Social History, medications, allergies, and triage vital signs were reviewed just prior to their medical evaluation.        Review of patient's allergies indicates:  No Known Allergies  Past Medical History:   Diagnosis Date    Diabetes mellitus     Diabetes mellitus type I     diagnosed 5 years ago     Past Surgical History:   Procedure Laterality Date     SECTION, LOW TRANSVERSE       Family History   Problem Relation Age of Onset    No Known Problems Sister     No Known Problems Brother     Congenital heart disease Neg Hx     Pacemaker/defibrilator Neg Hx     Early death Neg Hx      Social History     Tobacco Use    Smoking status: Never Smoker    Smokeless tobacco: Never Used   Substance Use Topics    Alcohol use: No    Drug use: No     Review of Systems   Constitutional: Negative for fever.   HENT: Negative for sore throat.    Eyes: Negative for visual disturbance.   Respiratory: Positive for shortness of breath. Negative for cough.    Cardiovascular: Positive for chest pain.   Gastrointestinal: Positive for nausea. Negative for  abdominal pain, diarrhea and vomiting.   Genitourinary: Negative for dysuria.   Musculoskeletal: Negative for neck pain.   Skin: Negative for rash and wound.   Allergic/Immunologic: Negative for immunocompromised state.   Neurological: Negative for syncope.   Psychiatric/Behavioral: Negative for confusion.       Physical Exam     Initial Vitals [09/28/20 0218]   BP Pulse Resp Temp SpO2   (!) 132/91 94 16 97.5 °F (36.4 °C) 100 %      MAP       --         Physical Exam    Nursing note and vitals reviewed.  Constitutional: She appears well-developed and well-nourished. She is not diaphoretic. No distress.   HENT:   Head: Normocephalic and atraumatic.   Nose: Nose normal.   Eyes: Conjunctivae are normal. Right eye exhibits no discharge. Left eye exhibits no discharge.   Neck: Normal range of motion. Neck supple.   Cardiovascular: Normal rate, regular rhythm and normal heart sounds. Exam reveals no gallop and no friction rub.    No murmur heard.  Pulmonary/Chest: Breath sounds normal. No respiratory distress. She has no wheezes. She has no rhonchi. She has no rales.   Musculoskeletal: Normal range of motion. No tenderness or edema.   Neurological: She is alert and oriented to person, place, and time. She has normal strength. GCS score is 15. GCS eye subscore is 4. GCS verbal subscore is 5. GCS motor subscore is 6.   Skin: Skin is warm and dry. No rash noted. No erythema.   Psychiatric: She has a normal mood and affect. Her behavior is normal. Judgment and thought content normal.         ED Course   Procedures  Labs Reviewed   COMPREHENSIVE METABOLIC PANEL - Abnormal; Notable for the following components:       Result Value    Sodium 128 (*)     CO2 11 (*)     Glucose 224 (*)     Total Protein 9.8 (*)     ALT 7 (*)     Anion Gap 17 (*)     All other components within normal limits   BETA - HYDROXYBUTYRATE, SERUM - Abnormal; Notable for the following components:    Beta-Hydroxybutyrate 3.7 (*)     All other components  within normal limits   BASIC METABOLIC PANEL - Abnormal; Notable for the following components:    Sodium 129 (*)     CO2 10 (*)     Glucose 240 (*)     Calcium 8.6 (*)     All other components within normal limits   POCT GLUCOSE - Abnormal; Notable for the following components:    POCT Glucose 207 (*)     All other components within normal limits   ISTAT PROCEDURE - Abnormal; Notable for the following components:    POC PH 7.335 (*)     POC PCO2 24.1 (*)     POC HCO3 12.8 (*)     POC SATURATED O2 84 (*)     POC TCO2 14 (*)     All other components within normal limits   POCT GLUCOSE - Abnormal; Notable for the following components:    POCT Glucose 257 (*)     All other components within normal limits   CBC W/ AUTO DIFFERENTIAL   MAGNESIUM   POCT URINE PREGNANCY   SARS-COV-2 RDRP GENE     EKG Readings: (Independently Interpreted)   Initial Reading: No STEMI. Rhythm: Normal Sinus Rhythm. Heart Rate: 83. Ectopy: No Ectopy. Conduction: Normal. ST Segments: Normal ST Segments. T Waves: Normal. Clinical Impression: Normal Sinus Rhythm     ECG Results          EKG 12-lead (Final result)  Result time 09/28/20 11:53:36    Final result by Interface, Lab In [facility] (09/28/20 11:53:36)                 Narrative:    Test Reason : R06.02,    Vent. Rate : 083 BPM     Atrial Rate : 083 BPM     P-R Int : 160 ms          QRS Dur : 074 ms      QT Int : 398 ms       P-R-T Axes : 110 081 083 degrees     QTc Int : 467 ms    Normal sinus rhythm  Normal ECG  When compared with ECG of 08-OCT-2019 21:19,  No significant change was found  Confirmed by Vale Walters MD ([id number]) on 9/28/2020 11:53:22 AM    Referred By: [email or web address]   SELF           Confirmed By:Vale Walters MD                            Imaging Results          X-Ray Chest AP Portable (Final result)  Result time 09/28/20 04:13:31    Final result by Franklyn Morrow MD (09/28/20 04:13:31)                 Impression:      No acute findings in the chest.      Electronically  signed by: Franklyn Morrow MD  Date:    09/28/2020  Time:    04:13             Narrative:    EXAMINATION:  XR CHEST AP PORTABLE    CLINICAL HISTORY:  shortness of breath;    TECHNIQUE:  Single frontal view of the chest was performed.    COMPARISON:  01/09/2017.    FINDINGS:  T1 spina bifida, unchanged.    No consolidation, pleural effusion or pneumothorax.    Cardiomediastinal silhouette is unremarkable.                                 Medical Decision Making:   History:   Old Medical Records: I decided to obtain old medical records.  Independently Interpreted Test(s):   I have ordered and independently interpreted EKG Reading(s) - see prior notes  Clinical Tests:   Lab Tests: Ordered and Reviewed  Radiological Study: Ordered and Reviewed  ED Management:  39-year-old female with a history of type 1 diabetes presents with elevated glucose.  Vitals normal.  Physical exam normal.  Labs with hyperglycemia, but no significant DKA.  EKG unremarkable.  CXR unremarkable.  Treated with fluids and sc insulin.  Sugar down to 200.  Patient feels vastly improved.  She declined observation or further insulin.  Requests discharged.  She will resume her home insulin regimen and follow up with her primary physician.  Patient will return to ED for worsening symptoms, inability to eat/drink, fever greater than 100.4, or any other concerns.  Did bedside teaching with return precautions.  All questions answered.  The patient acknowledges understanding.  Gave verbal discharge instructions.                             Clinical Impression:       ICD-10-CM ICD-9-CM   1. Diabetic ketoacidosis without coma associated with type 1 diabetes mellitus  E10.10 250.11   2. Shortness of breath  R06.02 786.05   3. Hyperglycemia  R73.9 790.29                      Disposition:   Disposition: Discharged  Condition: Stable     ED Disposition Condition    Discharge Stable        ED Prescriptions     None        Follow-up Information     Follow up With  Specialties Details Why Contact Info    Follow up with primary physician as soon as possible.  Call tomorrow for an appointment.        [facility] Emergency Medicine  Return to ED for worsening symptoms, inability to eat/drink, fever greater than 100.4, or any other concerns. [address]  [facility] [id number] [phone]                          Level of Complexity:  High, level 5.             Carrillo Langford MD  09/28/20 [id number]

## 2020-10-05 ENCOUNTER — PATIENT MESSAGE (OUTPATIENT)
Dept: ADMINISTRATIVE | Facility: HOSPITAL | Age: 40
End: 2020-10-05

## 2020-12-18 ENCOUNTER — HOSPITAL ENCOUNTER (EMERGENCY)
Facility: HOSPITAL | Age: 40
Discharge: HOME | End: 2020-12-18
Attending: EMERGENCY MEDICINE
Payer: COMMERCIAL

## 2020-12-18 VITALS
DIASTOLIC BLOOD PRESSURE: 77 MMHG | WEIGHT: 154 LBS | RESPIRATION RATE: 16 BRPM | HEIGHT: 65 IN | TEMPERATURE: 98 F | SYSTOLIC BLOOD PRESSURE: 122 MMHG | BODY MASS INDEX: 25.66 KG/M2 | OXYGEN SATURATION: 99 % | HEART RATE: 89 BPM

## 2020-12-18 DIAGNOSIS — R22.0 MASS OF SCALP: Primary | ICD-10-CM

## 2020-12-18 LAB
B-HCG UR QL: NEGATIVE
CTP QC/QA: YES

## 2020-12-18 PROCEDURE — 81025 URINE PREGNANCY TEST: CPT | Performed by: NURSE PRACTITIONER

## 2020-12-18 PROCEDURE — 25000003 PHARM REV CODE 250: Performed by: NURSE PRACTITIONER

## 2020-12-18 PROCEDURE — 99284 EMERGENCY DEPT VISIT MOD MDM: CPT | Mod: 25

## 2020-12-18 RX ORDER — ACETAMINOPHEN 325 MG/1
650 TABLET ORAL
Status: COMPLETED | OUTPATIENT
Start: 2020-12-18 | End: 2020-12-18

## 2020-12-18 RX ORDER — NAPROXEN 500 MG/1
500 TABLET ORAL 2 TIMES DAILY PRN
Qty: 10 TABLET | Refills: 0 | Status: SHIPPED | OUTPATIENT
Start: 2020-12-18 | End: 2020-12-23

## 2020-12-18 RX ORDER — CEPHALEXIN 500 MG/1
500 CAPSULE ORAL 4 TIMES DAILY
Qty: 28 CAPSULE | Refills: 0 | Status: SHIPPED | OUTPATIENT
Start: 2020-12-18 | End: 2020-12-25

## 2020-12-18 RX ORDER — CEPHALEXIN 500 MG/1
500 CAPSULE ORAL
Status: COMPLETED | OUTPATIENT
Start: 2020-12-18 | End: 2020-12-18

## 2020-12-18 RX ADMIN — CEPHALEXIN 500 MG: 500 CAPSULE ORAL at 02:12

## 2020-12-18 RX ADMIN — ACETAMINOPHEN 650 MG: 325 TABLET ORAL at 02:12

## 2020-12-18 NOTE — ED TRIAGE NOTES
Patient reports that she has a lump to back of head first noticed this pass weekend denies draining but noticed it was getting bigger everyday, reports that it painful to touch, patient rate pain 8/10 on pain scale patient denies hitting  Head but did not take any medication for pain prior to ED visit

## 2020-12-18 NOTE — DISCHARGE INSTRUCTIONS
Please make sure to maintain good hygiene; use warm compresses 10-15 minutes, 4-5 times a day to help increase wound drainage and decrease swelling, and take your antibiotic medication as prescribed.     Please return to the Emergency Department for any new or worsening problems including: worsening of your abscess, increasing redness or redness extending further up your body, or temperature of greater than 100.4F.

## 2020-12-18 NOTE — ED PROVIDER NOTES
"Encounter Date: 2020       History     Chief Complaint   Patient presents with    Mass     Pt states "I have a painful lump on the back of my scalp. I noticed it ".      CC:  Scalp mass    HPI:  This is a 40-year-old female with type 1 diabetes presenting to the ED with painful mass to the posterior scalp that she 1st noticed on .  Masses become more painful and tender.  She reports associated left-sided neck pain.  No fever, chills, ear pain, sore throat.  She is compliant with her insulin.  Denies nausea vomiting.  No attempted treatment prior to arrival.  No recent antibiotic use.  No head injury or trauma.    The history is provided by the patient. No  was used.     Review of patient's allergies indicates:  No Known Allergies  Past Medical History:   Diagnosis Date    Diabetes mellitus     Diabetes mellitus type I     diagnosed 5 years ago     Past Surgical History:   Procedure Laterality Date     SECTION, LOW TRANSVERSE       Family History   Problem Relation Age of Onset    No Known Problems Sister     No Known Problems Brother     Congenital heart disease Neg Hx     Pacemaker/defibrilator Neg Hx     Early death Neg Hx      Social History     Tobacco Use    Smoking status: Never Smoker    Smokeless tobacco: Never Used   Substance Use Topics    Alcohol use: No    Drug use: No     Review of Systems   Constitutional: Negative for chills and fever.   HENT: Negative for sore throat.    Respiratory: Negative for shortness of breath.    Cardiovascular: Negative for chest pain.   Gastrointestinal: Negative for abdominal pain, nausea and vomiting.   Genitourinary: Negative for dysuria.   Musculoskeletal: Negative for back pain.   Skin: Negative for rash.        Scalp mass   Neurological: Negative for weakness.   Hematological: Does not bruise/bleed easily.       Physical Exam     Initial Vitals [20 1351]   BP Pulse Resp Temp SpO2   122/77 89 16 97.9 " °F (36.6 °C) 99 %      MAP       --         Physical Exam    Constitutional: She appears well-developed and well-nourished. She is not diaphoretic. No distress.   HENT:   Head: Normocephalic and atraumatic.   Right Ear: Hearing, tympanic membrane, external ear and ear canal normal.   Left Ear: Hearing, tympanic membrane, external ear and ear canal normal.   Nose: No mucosal edema or rhinorrhea.   Mouth/Throat: No oropharyngeal exudate or posterior oropharyngeal erythema.   Eyes: Conjunctivae and EOM are normal. Pupils are equal, round, and reactive to light. Right eye exhibits no discharge. Left eye exhibits no discharge.   Neck: Normal range of motion. Neck supple.   Cardiovascular: Normal rate, regular rhythm, normal heart sounds and intact distal pulses.   Pulmonary/Chest: Breath sounds normal. No respiratory distress.   Abdominal: Soft. Bowel sounds are normal. There is no hepatosplenomegaly. There is no abdominal tenderness. There is no rigidity, no rebound, no guarding, no CVA tenderness, no tenderness at McBurney's point and negative Saeed's sign. No hernia.   Musculoskeletal: Normal range of motion.   Neurological: She is alert and oriented to person, place, and time.   Skin: Skin is warm and dry. Lesion noted.   Mildly erythematous 3 cm nodule that is tender to the posterior scalp.  No area of fluctuance.  No drainage or discharge.   Psychiatric: She has a normal mood and affect. Her behavior is normal.         ED Course   Procedures  Labs Reviewed   POCT URINE PREGNANCY          Imaging Results    None          Medical Decision Making:   ED Management:  40-year-old female presenting to the ED with painful scalp mass.  No fever, chills, nausea, vomiting.  She is compliant with her insulin.  No injury or trauma to suggest underlying fracture.  No area of fluctuance to suggest abscess at this time.  Suspect epidermoid cyst versus folliculitis.  Will treat with Keflex.  Encourage tight glucose control.   Return precautions were discussed and the patient verbalized understanding.                             Clinical Impression:       ICD-10-CM ICD-9-CM   1. Mass of scalp  R22.0 782.2                      Disposition:   Disposition: Discharged  Condition: Stable     ED Disposition Condition    Discharge Stable        ED Prescriptions     Medication Sig Dispense Start Date End Date Auth. Provider    cephALEXin (KEFLEX) 500 MG capsule Take 1 capsule (500 mg total) by mouth 4 (four) times daily. for 7 days 28 capsule 12/18/2020 12/25/2020 Allie Jessica NP    naproxen (NAPROSYN) 500 MG tablet Take 1 tablet (500 mg total) by mouth 2 (two) times daily as needed (pain). 10 tablet 12/18/2020 12/23/2020 Allie Jessica NP        Follow-up Information     Follow up With Specialties Details Why Contact Info    Tammy Perez MD Family Medicine   [address] [address]  [phone]      [facility] Emergency Medicine Go to  If symptoms worsen [address]  [facility] [address] [phone]                                       Allie Jessica NP  12/18/20 2082

## 2021-01-05 ENCOUNTER — PATIENT MESSAGE (OUTPATIENT)
Dept: ADMINISTRATIVE | Facility: HOSPITAL | Age: 41
End: 2021-01-05

## 2021-03-15 ENCOUNTER — CLINICAL SUPPORT (OUTPATIENT)
Dept: URGENT CARE | Facility: CLINIC | Age: 41
End: 2021-03-15
Payer: COMMERCIAL

## 2021-03-15 DIAGNOSIS — Z11.9 SCREENING EXAMINATION FOR INFECTIOUS DISEASE: Primary | ICD-10-CM

## 2021-03-15 LAB
CTP QC/QA: YES
SARS-COV-2 RDRP RESP QL NAA+PROBE: NEGATIVE

## 2021-03-15 PROCEDURE — U0002: ICD-10-PCS | Mod: QW,S$GLB,, | Performed by: PHYSICIAN ASSISTANT

## 2021-03-15 PROCEDURE — U0002 COVID-19 LAB TEST NON-CDC: HCPCS | Mod: QW,S$GLB,, | Performed by: PHYSICIAN ASSISTANT

## 2021-04-06 ENCOUNTER — PATIENT MESSAGE (OUTPATIENT)
Dept: ADMINISTRATIVE | Facility: HOSPITAL | Age: 41
End: 2021-04-06

## 2021-05-31 ENCOUNTER — OFFICE VISIT (OUTPATIENT)
Dept: OBSTETRICS AND GYNECOLOGY | Facility: CLINIC | Age: 41
End: 2021-05-31
Payer: COMMERCIAL

## 2021-05-31 VITALS — WEIGHT: 162.5 LBS | BODY MASS INDEX: 27.04 KG/M2 | SYSTOLIC BLOOD PRESSURE: 128 MMHG | DIASTOLIC BLOOD PRESSURE: 90 MMHG

## 2021-05-31 DIAGNOSIS — B37.31 YEAST VAGINITIS: ICD-10-CM

## 2021-05-31 DIAGNOSIS — Z01.419 VISIT FOR GYNECOLOGIC EXAMINATION: Primary | ICD-10-CM

## 2021-05-31 PROCEDURE — 3008F BODY MASS INDEX DOCD: CPT | Mod: CPTII,S$GLB,, | Performed by: OBSTETRICS & GYNECOLOGY

## 2021-05-31 PROCEDURE — 99999 PR PBB SHADOW E&M-EST. PATIENT-LVL II: ICD-10-PCS | Mod: PBBFAC,,, | Performed by: OBSTETRICS & GYNECOLOGY

## 2021-05-31 PROCEDURE — 87481 CANDIDA DNA AMP PROBE: CPT | Mod: 59 | Performed by: OBSTETRICS & GYNECOLOGY

## 2021-05-31 PROCEDURE — 87661 TRICHOMONAS VAGINALIS AMPLIF: CPT | Mod: 59 | Performed by: OBSTETRICS & GYNECOLOGY

## 2021-05-31 PROCEDURE — 99385 PREV VISIT NEW AGE 18-39: CPT | Mod: S$GLB,,, | Performed by: OBSTETRICS & GYNECOLOGY

## 2021-05-31 PROCEDURE — 99385 PR PREVENTIVE VISIT,NEW,18-39: ICD-10-PCS | Mod: S$GLB,,, | Performed by: OBSTETRICS & GYNECOLOGY

## 2021-05-31 PROCEDURE — 1126F PR PAIN SEVERITY QUANTIFIED, NO PAIN PRESENT: ICD-10-PCS | Mod: S$GLB,,, | Performed by: OBSTETRICS & GYNECOLOGY

## 2021-05-31 PROCEDURE — 3008F PR BODY MASS INDEX (BMI) DOCUMENTED: ICD-10-PCS | Mod: CPTII,S$GLB,, | Performed by: OBSTETRICS & GYNECOLOGY

## 2021-05-31 PROCEDURE — 1126F AMNT PAIN NOTED NONE PRSNT: CPT | Mod: S$GLB,,, | Performed by: OBSTETRICS & GYNECOLOGY

## 2021-05-31 PROCEDURE — 88175 CYTOPATH C/V AUTO FLUID REDO: CPT | Performed by: OBSTETRICS & GYNECOLOGY

## 2021-05-31 PROCEDURE — 99999 PR PBB SHADOW E&M-EST. PATIENT-LVL II: CPT | Mod: PBBFAC,,, | Performed by: OBSTETRICS & GYNECOLOGY

## 2021-05-31 PROCEDURE — 87624 HPV HI-RISK TYP POOLED RSLT: CPT | Performed by: OBSTETRICS & GYNECOLOGY

## 2021-05-31 RX ORDER — TERCONAZOLE 8 MG/G
1 CREAM VAGINAL NIGHTLY
Qty: 20 G | Refills: 0 | Status: SHIPPED | OUTPATIENT
Start: 2021-05-31 | End: 2021-06-03

## 2021-06-02 LAB
BACTERIAL VAGINOSIS DNA: NEGATIVE
CANDIDA GLABRATA DNA: NEGATIVE
CANDIDA KRUSEI DNA: NEGATIVE
CANDIDA RRNA VAG QL PROBE: NEGATIVE
T VAGINALIS RRNA GENITAL QL PROBE: NEGATIVE

## 2021-06-03 LAB
FINAL PATHOLOGIC DIAGNOSIS: NORMAL
Lab: NORMAL

## 2021-06-07 LAB
HPV HR 12 DNA SPEC QL NAA+PROBE: NEGATIVE
HPV16 AG SPEC QL: NEGATIVE
HPV18 DNA SPEC QL NAA+PROBE: NEGATIVE

## 2021-06-16 ENCOUNTER — PATIENT MESSAGE (OUTPATIENT)
Dept: OBSTETRICS AND GYNECOLOGY | Facility: CLINIC | Age: 41
End: 2021-06-16

## 2021-07-07 ENCOUNTER — PATIENT MESSAGE (OUTPATIENT)
Dept: ADMINISTRATIVE | Facility: HOSPITAL | Age: 41
End: 2021-07-07

## 2021-08-04 ENCOUNTER — PATIENT MESSAGE (OUTPATIENT)
Dept: ADMINISTRATIVE | Facility: HOSPITAL | Age: 41
End: 2021-08-04

## 2021-10-04 ENCOUNTER — PATIENT MESSAGE (OUTPATIENT)
Dept: ADMINISTRATIVE | Facility: HOSPITAL | Age: 41
End: 2021-10-04

## 2021-10-29 NOTE — PROGRESS NOTES
Airway  Urgency: elective    Date/Time: 10/29/2021 10:05 AM  Airway not difficult    General Information and Staff    Patient location during procedure: OR  Anesthesiologist: Tru Aggarwal DO  CRNA: Inocencia Wallace CRNA    Indications and Patient Condition  Indications for airway management: airway protection    Preoxygenated: yes  MILS not maintained throughout  Mask difficulty assessment: 1 - vent by mask    Final Airway Details  Final airway type: supraglottic airway      Successful airway: LMA and unique  Size 5    Number of attempts at approach: 1  Assessment: lips, teeth, and gum same as pre-op and atraumatic intubation    Additional Comments  Airway exam prior to airway device insertion. Teeth/lips inspected. Preoxygenated with 100% O2; sniffing position, easy mask ventilation. Eyes taped. Atraumatic device insertion. Airway device connected to anesthesia machine. Confirmed EBBS, +EtCO2. Lips and teeth intact, no damage.             Obstetrical ultrasound completed today.  See report in imaging section of [facility].

## 2022-05-30 ENCOUNTER — PATIENT MESSAGE (OUTPATIENT)
Dept: ADMINISTRATIVE | Facility: HOSPITAL | Age: 42
End: 2022-05-30
Payer: COMMERCIAL

## 2024-12-17 NOTE — PLAN OF CARE
Problem: Patient Care Overview  Goal: Plan of Care Review  Outcome: Ongoing (interventions implemented as appropriate)  Pt c/o discomfort 2/10, afebrile, denies n/v, h/a, dizziness, +FM, denies VB, LOF, bed at lowest position, call light within reach, side rails up x2, family to bedside, VSS, closed cervix at 1830.       17-Dec-2024 09:49